# Patient Record
Sex: MALE | Race: WHITE | Employment: OTHER | URBAN - METROPOLITAN AREA
[De-identification: names, ages, dates, MRNs, and addresses within clinical notes are randomized per-mention and may not be internally consistent; named-entity substitution may affect disease eponyms.]

---

## 2017-03-09 PROBLEM — N20.1 URETERAL CALCULUS: Status: ACTIVE | Noted: 2017-03-09

## 2017-03-13 ENCOUNTER — OFFICE VISIT (OUTPATIENT)
Dept: UROLOGY | Age: 62
End: 2017-03-13
Payer: COMMERCIAL

## 2017-03-13 VITALS
TEMPERATURE: 98 F | DIASTOLIC BLOOD PRESSURE: 80 MMHG | HEIGHT: 72 IN | WEIGHT: 314 LBS | SYSTOLIC BLOOD PRESSURE: 110 MMHG | BODY MASS INDEX: 42.53 KG/M2

## 2017-03-13 DIAGNOSIS — N20.1 URETERAL CALCULUS: Primary | ICD-10-CM

## 2017-03-13 PROCEDURE — 99213 OFFICE O/P EST LOW 20 MIN: CPT | Performed by: UROLOGY

## 2017-03-13 PROCEDURE — 52310 CYSTOSCOPY AND TREATMENT: CPT | Performed by: UROLOGY

## 2017-04-22 ENCOUNTER — HOSPITAL ENCOUNTER (OUTPATIENT)
Dept: GENERAL RADIOLOGY | Age: 62
Discharge: HOME | End: 2017-04-22
Payer: COMMERCIAL

## 2017-04-22 ENCOUNTER — HOSPITAL ENCOUNTER (OUTPATIENT)
Age: 62
Discharge: HOME | End: 2017-04-22
Payer: COMMERCIAL

## 2017-04-22 DIAGNOSIS — N20.1 URETERAL CALCULUS: ICD-10-CM

## 2017-04-22 PROCEDURE — 74000 XR ABDOMEN LIMITED (KUB): CPT

## 2017-04-26 ENCOUNTER — OFFICE VISIT (OUTPATIENT)
Dept: UROLOGY | Age: 62
End: 2017-04-26
Payer: COMMERCIAL

## 2017-04-26 VITALS
HEIGHT: 72 IN | WEIGHT: 310 LBS | DIASTOLIC BLOOD PRESSURE: 80 MMHG | SYSTOLIC BLOOD PRESSURE: 110 MMHG | BODY MASS INDEX: 41.99 KG/M2

## 2017-04-26 DIAGNOSIS — N20.0 KIDNEY STONES: Primary | ICD-10-CM

## 2017-04-26 PROCEDURE — 99213 OFFICE O/P EST LOW 20 MIN: CPT | Performed by: PHYSICIAN ASSISTANT

## 2017-07-25 ENCOUNTER — APPOINTMENT (OUTPATIENT)
Dept: CT IMAGING | Age: 62
End: 2017-07-25
Payer: COMMERCIAL

## 2017-07-25 ENCOUNTER — HOSPITAL ENCOUNTER (EMERGENCY)
Age: 62
Discharge: HOME | End: 2017-07-25
Attending: EMERGENCY MEDICINE
Payer: COMMERCIAL

## 2017-07-25 VITALS
WEIGHT: 310 LBS | HEART RATE: 86 BPM | TEMPERATURE: 98.3 F | DIASTOLIC BLOOD PRESSURE: 69 MMHG | BODY MASS INDEX: 41.99 KG/M2 | OXYGEN SATURATION: 97 % | HEIGHT: 72 IN | SYSTOLIC BLOOD PRESSURE: 108 MMHG | RESPIRATION RATE: 20 BRPM

## 2017-07-25 DIAGNOSIS — K59.03 DRUG-INDUCED CONSTIPATION: ICD-10-CM

## 2017-07-25 DIAGNOSIS — R94.31 ABNORMAL EKG: Primary | ICD-10-CM

## 2017-07-25 DIAGNOSIS — M79.89 SWELLING OF LOWER EXTREMITY: ICD-10-CM

## 2017-07-25 LAB
-: ABNORMAL
ABSOLUTE EOS #: 0.2 K/UL (ref 0–0.4)
ABSOLUTE LYMPH #: 1.5 K/UL (ref 1–4.8)
ABSOLUTE MONO #: 0.7 K/UL (ref 0.2–0.8)
ALBUMIN SERPL-MCNC: 4 G/DL (ref 3.5–5.2)
ALBUMIN/GLOBULIN RATIO: 1.1 (ref 1–2.5)
ALP BLD-CCNC: 81 U/L (ref 40–129)
ALT SERPL-CCNC: 41 U/L (ref 5–41)
AMORPHOUS: ABNORMAL
ANION GAP SERPL CALCULATED.3IONS-SCNC: 16 MMOL/L (ref 9–17)
AST SERPL-CCNC: 27 U/L
BACTERIA: ABNORMAL
BASOPHILS # BLD: 1 %
BASOPHILS ABSOLUTE: 0.1 K/UL (ref 0–0.2)
BILIRUB SERPL-MCNC: 1.1 MG/DL (ref 0.3–1.2)
BILIRUBIN URINE: NEGATIVE
BUN BLDV-MCNC: 18 MG/DL (ref 6–20)
BUN/CREAT BLD: 15 (ref 9–20)
CALCIUM SERPL-MCNC: 10.1 MG/DL (ref 8.6–10.4)
CASTS UA: ABNORMAL /LPF
CHLORIDE BLD-SCNC: 94 MMOL/L (ref 98–107)
CO2: 27 MMOL/L (ref 20–31)
COLOR: YELLOW
COMMENT UA: ABNORMAL
CREAT SERPL-MCNC: 1.21 MG/DL (ref 0.7–1.2)
CRYSTALS, UA: ABNORMAL /HPF
D-DIMER QUANTITATIVE: 2.34 MG/L FEU (ref 0.19–0.5)
DIFFERENTIAL TYPE: ABNORMAL
EOSINOPHILS RELATIVE PERCENT: 2 %
EPITHELIAL CELLS UA: ABNORMAL /HPF (ref 0–5)
GFR AFRICAN AMERICAN: >60 ML/MIN
GFR NON-AFRICAN AMERICAN: >60 ML/MIN
GFR SERPL CREATININE-BSD FRML MDRD: ABNORMAL ML/MIN/{1.73_M2}
GFR SERPL CREATININE-BSD FRML MDRD: ABNORMAL ML/MIN/{1.73_M2}
GLUCOSE BLD-MCNC: 115 MG/DL (ref 70–99)
GLUCOSE URINE: NEGATIVE
HCT VFR BLD CALC: 38.3 % (ref 41–53)
HEMOGLOBIN: 12.8 G/DL (ref 13.5–17)
INR BLD: 1 (ref 0.9–1.2)
KETONES, URINE: NEGATIVE
LEUKOCYTE ESTERASE, URINE: NEGATIVE
LYMPHOCYTES # BLD: 16 %
MCH RBC QN AUTO: 27.4 PG (ref 26–34)
MCHC RBC AUTO-ENTMCNC: 33.5 G/DL (ref 31–37)
MCV RBC AUTO: 81.8 FL (ref 80–100)
MONOCYTES # BLD: 7 %
MUCUS: ABNORMAL
NITRITE, URINE: NEGATIVE
OTHER OBSERVATIONS UA: ABNORMAL
PARTIAL THROMBOPLASTIN TIME: 29.4 SEC (ref 23.2–34.4)
PDW BLD-RTO: 13.5 % (ref 12.1–15.2)
PH UA: 6 (ref 5–9)
PLATELET # BLD: 235 K/UL (ref 140–450)
PLATELET ESTIMATE: ABNORMAL
PMV BLD AUTO: 7.3 FL (ref 6–12)
POTASSIUM SERPL-SCNC: 4 MMOL/L (ref 3.7–5.3)
PROTEIN UA: NEGATIVE
PROTHROMBIN TIME: 10 SEC (ref 9.7–12.2)
RBC # BLD: 4.68 M/UL (ref 4.5–5.9)
RBC # BLD: ABNORMAL 10*6/UL
RBC UA: ABNORMAL /HPF (ref 0–2)
RENAL EPITHELIAL, UA: ABNORMAL /HPF
SEG NEUTROPHILS: 74 %
SEGMENTED NEUTROPHILS ABSOLUTE COUNT: 7.1 K/UL (ref 1.8–7.7)
SODIUM BLD-SCNC: 137 MMOL/L (ref 135–144)
SPECIFIC GRAVITY UA: 1.02 (ref 1.01–1.02)
TOTAL PROTEIN: 7.5 G/DL (ref 6.4–8.3)
TRICHOMONAS: ABNORMAL
TROPONIN INTERP: NORMAL
TROPONIN T: <0.03 NG/ML
TSH SERPL DL<=0.05 MIU/L-ACNC: 0.65 MIU/L (ref 0.3–5)
TURBIDITY: CLEAR
URINE HGB: NEGATIVE
UROBILINOGEN, URINE: NORMAL
WBC # BLD: 9.6 K/UL (ref 3.5–11)
WBC # BLD: ABNORMAL 10*3/UL
WBC UA: ABNORMAL /HPF (ref 0–5)
YEAST: ABNORMAL

## 2017-07-25 PROCEDURE — 85025 COMPLETE CBC W/AUTO DIFF WBC: CPT

## 2017-07-25 PROCEDURE — 74176 CT ABD & PELVIS W/O CONTRAST: CPT

## 2017-07-25 PROCEDURE — 85730 THROMBOPLASTIN TIME PARTIAL: CPT

## 2017-07-25 PROCEDURE — 6360000004 HC RX CONTRAST MEDICATION: Performed by: EMERGENCY MEDICINE

## 2017-07-25 PROCEDURE — 85610 PROTHROMBIN TIME: CPT

## 2017-07-25 PROCEDURE — 80053 COMPREHEN METABOLIC PANEL: CPT

## 2017-07-25 PROCEDURE — 85379 FIBRIN DEGRADATION QUANT: CPT

## 2017-07-25 PROCEDURE — 93005 ELECTROCARDIOGRAM TRACING: CPT

## 2017-07-25 PROCEDURE — 99284 EMERGENCY DEPT VISIT MOD MDM: CPT

## 2017-07-25 PROCEDURE — 84484 ASSAY OF TROPONIN QUANT: CPT

## 2017-07-25 PROCEDURE — 2580000003 HC RX 258: Performed by: EMERGENCY MEDICINE

## 2017-07-25 PROCEDURE — 84443 ASSAY THYROID STIM HORMONE: CPT

## 2017-07-25 PROCEDURE — 71275 CT ANGIOGRAPHY CHEST: CPT

## 2017-07-25 PROCEDURE — 96372 THER/PROPH/DIAG INJ SC/IM: CPT

## 2017-07-25 PROCEDURE — 6360000002 HC RX W HCPCS: Performed by: EMERGENCY MEDICINE

## 2017-07-25 PROCEDURE — 81001 URINALYSIS AUTO W/SCOPE: CPT

## 2017-07-25 RX ORDER — POTASSIUM CHLORIDE 1500 MG/1
20 TABLET, FILM COATED, EXTENDED RELEASE ORAL
COMMUNITY
End: 2019-05-14

## 2017-07-25 RX ADMIN — ENOXAPARIN SODIUM 120 MG: 120 INJECTION SUBCUTANEOUS at 22:58

## 2017-07-25 RX ADMIN — IOPAMIDOL 100 ML: 612 INJECTION, SOLUTION INTRAVENOUS at 21:37

## 2017-07-25 RX ADMIN — SODIUM CHLORIDE 500 ML: 9 INJECTION, SOLUTION INTRAVENOUS at 19:42

## 2017-07-25 ASSESSMENT — PAIN DESCRIPTION - PAIN TYPE: TYPE: ACUTE PAIN

## 2017-07-25 ASSESSMENT — PAIN DESCRIPTION - LOCATION: LOCATION: ABDOMEN

## 2017-07-25 ASSESSMENT — PAIN SCALES - GENERAL: PAINLEVEL_OUTOF10: 6

## 2017-07-26 ENCOUNTER — HOSPITAL ENCOUNTER (OUTPATIENT)
Dept: VASCULAR LAB | Age: 62
Discharge: HOME | End: 2017-07-26
Payer: COMMERCIAL

## 2017-07-26 DIAGNOSIS — R60.9 SWELLING: ICD-10-CM

## 2017-07-26 DIAGNOSIS — R79.89 ELEVATED D-DIMER: ICD-10-CM

## 2017-07-26 PROCEDURE — 93970 EXTREMITY STUDY: CPT

## 2017-08-03 LAB
EKG ATRIAL RATE: 73 BPM
EKG P AXIS: -8 DEGREES
EKG P-R INTERVAL: 202 MS
EKG Q-T INTERVAL: 406 MS
EKG QRS DURATION: 104 MS
EKG QTC CALCULATION (BAZETT): 447 MS
EKG R AXIS: -42 DEGREES
EKG T AXIS: -12 DEGREES
EKG VENTRICULAR RATE: 73 BPM

## 2017-09-07 ENCOUNTER — OFFICE VISIT (OUTPATIENT)
Dept: FAMILY MEDICINE CLINIC | Age: 62
End: 2017-09-07
Payer: COMMERCIAL

## 2017-09-07 VITALS
RESPIRATION RATE: 18 BRPM | HEIGHT: 72 IN | SYSTOLIC BLOOD PRESSURE: 120 MMHG | WEIGHT: 301 LBS | BODY MASS INDEX: 40.77 KG/M2 | HEART RATE: 72 BPM | DIASTOLIC BLOOD PRESSURE: 80 MMHG

## 2017-09-07 DIAGNOSIS — R10.32 LEFT LOWER QUADRANT PAIN: Primary | ICD-10-CM

## 2017-09-07 PROCEDURE — 99202 OFFICE O/P NEW SF 15 MIN: CPT | Performed by: FAMILY MEDICINE

## 2017-09-07 RX ORDER — PANTOPRAZOLE SODIUM 20 MG/1
20 TABLET, DELAYED RELEASE ORAL DAILY
Qty: 30 TABLET | Refills: 1 | Status: SHIPPED | OUTPATIENT
Start: 2017-09-07 | End: 2019-05-14

## 2017-09-07 RX ORDER — TESTOSTERONE GEL, 1% 10 MG/G
GEL TRANSDERMAL DAILY
COMMUNITY
End: 2018-04-25 | Stop reason: ALTCHOICE

## 2017-09-12 ENCOUNTER — OFFICE VISIT (OUTPATIENT)
Dept: CARDIOLOGY | Age: 62
End: 2017-09-12
Payer: COMMERCIAL

## 2017-09-12 VITALS
WEIGHT: 301.8 LBS | BODY MASS INDEX: 40.88 KG/M2 | SYSTOLIC BLOOD PRESSURE: 111 MMHG | DIASTOLIC BLOOD PRESSURE: 80 MMHG | OXYGEN SATURATION: 94 % | HEART RATE: 81 BPM | HEIGHT: 72 IN

## 2017-09-12 DIAGNOSIS — E78.5 DYSLIPIDEMIA: ICD-10-CM

## 2017-09-12 DIAGNOSIS — R94.31 ABNORMAL ECG: Primary | ICD-10-CM

## 2017-09-12 DIAGNOSIS — I10 ESSENTIAL HYPERTENSION: ICD-10-CM

## 2017-09-12 DIAGNOSIS — E66.01 OBESITY, MORBID, BMI 40.0-49.9 (HCC): ICD-10-CM

## 2017-09-12 PROCEDURE — 99244 OFF/OP CNSLTJ NEW/EST MOD 40: CPT | Performed by: INTERNAL MEDICINE

## 2017-09-13 ENCOUNTER — HOSPITAL ENCOUNTER (OUTPATIENT)
Dept: NON INVASIVE DIAGNOSTICS | Age: 62
Discharge: HOME | End: 2017-09-13
Payer: COMMERCIAL

## 2017-09-13 DIAGNOSIS — R94.31 ABNORMAL ECG: ICD-10-CM

## 2017-09-13 LAB
LV EF: 60 %
LVEF MODALITY: NORMAL

## 2017-09-13 PROCEDURE — 3430000000 HC RX DIAGNOSTIC RADIOPHARMACEUTICAL: Performed by: INTERNAL MEDICINE

## 2017-09-13 PROCEDURE — 78452 HT MUSCLE IMAGE SPECT MULT: CPT

## 2017-09-13 PROCEDURE — A9500 TC99M SESTAMIBI: HCPCS | Performed by: INTERNAL MEDICINE

## 2017-09-13 PROCEDURE — 93017 CV STRESS TEST TRACING ONLY: CPT

## 2017-09-13 PROCEDURE — 93306 TTE W/DOPPLER COMPLETE: CPT

## 2017-09-13 RX ADMIN — Medication 30 MILLICURIE: at 13:30

## 2017-09-14 ENCOUNTER — HOSPITAL ENCOUNTER (OUTPATIENT)
Dept: NON INVASIVE DIAGNOSTICS | Age: 62
Discharge: HOME | End: 2017-09-14
Payer: COMMERCIAL

## 2017-09-14 PROCEDURE — 3430000000 HC RX DIAGNOSTIC RADIOPHARMACEUTICAL: Performed by: INTERNAL MEDICINE

## 2017-09-14 PROCEDURE — A9500 TC99M SESTAMIBI: HCPCS | Performed by: INTERNAL MEDICINE

## 2017-09-14 RX ADMIN — Medication 30 MILLICURIE: at 08:00

## 2017-09-17 ASSESSMENT — ENCOUNTER SYMPTOMS
PHOTOPHOBIA: 0
TROUBLE SWALLOWING: 0
ABDOMINAL PAIN: 1
DIARRHEA: 0
CONSTIPATION: 0
SHORTNESS OF BREATH: 0
ABDOMINAL DISTENTION: 0
WHEEZING: 0
BACK PAIN: 0
FACIAL SWELLING: 0
NAUSEA: 0
BELCHING: 1
COUGH: 0
VOMITING: 0
BLOOD IN STOOL: 1
COLOR CHANGE: 0

## 2017-09-21 ENCOUNTER — TELEPHONE (OUTPATIENT)
Dept: CARDIOLOGY | Age: 62
End: 2017-09-21

## 2017-09-26 ENCOUNTER — OFFICE VISIT (OUTPATIENT)
Dept: CARDIOLOGY | Age: 62
End: 2017-09-26
Payer: COMMERCIAL

## 2017-09-26 VITALS
SYSTOLIC BLOOD PRESSURE: 120 MMHG | DIASTOLIC BLOOD PRESSURE: 85 MMHG | BODY MASS INDEX: 40.63 KG/M2 | OXYGEN SATURATION: 95 % | HEART RATE: 76 BPM | WEIGHT: 306.6 LBS | HEIGHT: 73 IN

## 2017-09-26 DIAGNOSIS — E66.01 SEVERE OBESITY (BMI >= 40) (HCC): ICD-10-CM

## 2017-09-26 DIAGNOSIS — R94.31 ABNORMAL ECG: Primary | ICD-10-CM

## 2017-09-26 DIAGNOSIS — I10 ESSENTIAL HYPERTENSION: ICD-10-CM

## 2017-09-26 DIAGNOSIS — R06.02 SHORTNESS OF BREATH ON EXERTION: ICD-10-CM

## 2017-09-26 DIAGNOSIS — E78.5 DYSLIPIDEMIA: ICD-10-CM

## 2017-09-26 PROCEDURE — 99213 OFFICE O/P EST LOW 20 MIN: CPT | Performed by: INTERNAL MEDICINE

## 2017-10-03 LAB
AVERAGE GLUCOSE: 120 MG/DL (ref 66–114)
CHOLESTEROL/HDL RATIO: 3.1
CHOLESTEROL: 115 MG/DL
HBA1C MFR BLD: 5.8 % (ref 4.2–5.8)
HDLC SERPL-MCNC: 37 MG/DL (ref 40–60)
LDL CHOLESTEROL CALCULATED: 51 MG/DL
LDL/HDL RATIO: 1.4
TRIGL SERPL-MCNC: 136 MG/DL
VLDLC SERPL CALC-MCNC: 27 MG/DL

## 2017-11-11 ENCOUNTER — HOSPITAL ENCOUNTER (OUTPATIENT)
Age: 62
Discharge: HOME | End: 2017-11-11
Payer: COMMERCIAL

## 2017-11-11 LAB
BUN BLDV-MCNC: 18 MG/DL (ref 8–23)
CREAT SERPL-MCNC: 0.93 MG/DL (ref 0.7–1.2)
GFR AFRICAN AMERICAN: >60 ML/MIN
GFR NON-AFRICAN AMERICAN: >60 ML/MIN
GFR SERPL CREATININE-BSD FRML MDRD: NORMAL ML/MIN/{1.73_M2}
GFR SERPL CREATININE-BSD FRML MDRD: NORMAL ML/MIN/{1.73_M2}
HCT VFR BLD CALC: 40.7 % (ref 41–53)
HEMOGLOBIN: 13.6 G/DL (ref 13.5–17)

## 2017-11-11 PROCEDURE — 85018 HEMOGLOBIN: CPT

## 2017-11-11 PROCEDURE — 84520 ASSAY OF UREA NITROGEN: CPT

## 2017-11-11 PROCEDURE — 82565 ASSAY OF CREATININE: CPT

## 2017-11-11 PROCEDURE — 85014 HEMATOCRIT: CPT

## 2017-11-11 PROCEDURE — 36415 COLL VENOUS BLD VENIPUNCTURE: CPT

## 2018-04-25 ENCOUNTER — OFFICE VISIT (OUTPATIENT)
Dept: UROLOGY | Age: 63
End: 2018-04-25
Payer: COMMERCIAL

## 2018-04-25 ENCOUNTER — HOSPITAL ENCOUNTER (OUTPATIENT)
Age: 63
Discharge: HOME | End: 2018-04-27
Payer: COMMERCIAL

## 2018-04-25 ENCOUNTER — HOSPITAL ENCOUNTER (OUTPATIENT)
Dept: GENERAL RADIOLOGY | Age: 63
Discharge: HOME | End: 2018-04-27
Payer: COMMERCIAL

## 2018-04-25 VITALS
WEIGHT: 293 LBS | DIASTOLIC BLOOD PRESSURE: 84 MMHG | SYSTOLIC BLOOD PRESSURE: 128 MMHG | BODY MASS INDEX: 39.68 KG/M2 | HEIGHT: 72 IN

## 2018-04-25 DIAGNOSIS — Z12.5 PROSTATE CANCER SCREENING: ICD-10-CM

## 2018-04-25 DIAGNOSIS — N20.0 KIDNEY STONES: ICD-10-CM

## 2018-04-25 DIAGNOSIS — N20.0 RENAL STONE: Primary | ICD-10-CM

## 2018-04-25 PROCEDURE — 99213 OFFICE O/P EST LOW 20 MIN: CPT | Performed by: NURSE PRACTITIONER

## 2018-04-25 PROCEDURE — 74018 RADEX ABDOMEN 1 VIEW: CPT

## 2018-04-25 RX ORDER — COVID-19 ANTIGEN TEST
KIT MISCELLANEOUS 2 TIMES DAILY
COMMUNITY
End: 2020-03-03

## 2018-04-25 ASSESSMENT — ENCOUNTER SYMPTOMS
COLOR CHANGE: 0
NAUSEA: 0
VOMITING: 0
EYE PAIN: 0
BACK PAIN: 0
COUGH: 0
ABDOMINAL PAIN: 0
SHORTNESS OF BREATH: 0
WHEEZING: 0
CONSTIPATION: 0
EYE REDNESS: 0

## 2018-05-01 ENCOUNTER — HOSPITAL ENCOUNTER (OUTPATIENT)
Age: 63
Discharge: HOME | End: 2018-05-01
Payer: COMMERCIAL

## 2018-05-01 DIAGNOSIS — Z12.5 PROSTATE CANCER SCREENING: ICD-10-CM

## 2018-05-01 LAB — PROSTATE SPECIFIC ANTIGEN: 0.11 UG/L

## 2018-05-01 PROCEDURE — 36415 COLL VENOUS BLD VENIPUNCTURE: CPT

## 2018-05-01 PROCEDURE — G0103 PSA SCREENING: HCPCS

## 2018-05-02 DIAGNOSIS — Z12.5 SCREENING PSA (PROSTATE SPECIFIC ANTIGEN): Primary | ICD-10-CM

## 2019-04-29 ENCOUNTER — TELEPHONE (OUTPATIENT)
Dept: UROLOGY | Age: 64
End: 2019-04-29

## 2019-04-29 DIAGNOSIS — N20.0 RENAL STONE: Primary | ICD-10-CM

## 2019-04-30 ENCOUNTER — HOSPITAL ENCOUNTER (OUTPATIENT)
Dept: GENERAL RADIOLOGY | Age: 64
Discharge: HOME | End: 2019-05-02
Payer: COMMERCIAL

## 2019-04-30 ENCOUNTER — HOSPITAL ENCOUNTER (OUTPATIENT)
Age: 64
Discharge: HOME | End: 2019-05-02
Payer: COMMERCIAL

## 2019-04-30 DIAGNOSIS — N20.0 RENAL STONE: ICD-10-CM

## 2019-04-30 PROCEDURE — 74018 RADEX ABDOMEN 1 VIEW: CPT

## 2019-05-14 ENCOUNTER — OFFICE VISIT (OUTPATIENT)
Dept: UROLOGY | Age: 64
End: 2019-05-14
Payer: COMMERCIAL

## 2019-05-14 VITALS
BODY MASS INDEX: 44.1 KG/M2 | HEIGHT: 71 IN | DIASTOLIC BLOOD PRESSURE: 104 MMHG | WEIGHT: 315 LBS | SYSTOLIC BLOOD PRESSURE: 148 MMHG | HEART RATE: 71 BPM

## 2019-05-14 DIAGNOSIS — N20.0 RENAL STONE: Primary | ICD-10-CM

## 2019-05-14 DIAGNOSIS — Z12.5 SCREENING PSA (PROSTATE SPECIFIC ANTIGEN): ICD-10-CM

## 2019-05-14 PROCEDURE — 99213 OFFICE O/P EST LOW 20 MIN: CPT | Performed by: NURSE PRACTITIONER

## 2019-05-14 RX ORDER — TESTOSTERONE CYPIONATE 200 MG/ML
VIAL (ML) INTRAMUSCULAR
Status: ON HOLD | COMMUNITY
End: 2020-07-13 | Stop reason: ALTCHOICE

## 2019-05-14 NOTE — PATIENT INSTRUCTIONS
To prevent future stone formation:  1) drink around 80 ounces of water per day  2) Avoid/minimize intake of \"bad fluids\" (soda pop, coffee, tea, alcohol, energy drinks)  3) reduce consumption of sodium/salt  4) reduce consumption of fatty animal protein (full-fat cheese/milk/dairy, red meats, pork).  Limit protein intake to low-fat/lean options (low-fat dairy, fish, chicken, turkey)

## 2019-05-21 ASSESSMENT — ENCOUNTER SYMPTOMS
EYE REDNESS: 0
BACK PAIN: 0
VOMITING: 0
EYE PAIN: 0
ABDOMINAL PAIN: 0
CONSTIPATION: 0
NAUSEA: 0
SHORTNESS OF BREATH: 0
COUGH: 0
COLOR CHANGE: 0
WHEEZING: 0

## 2019-05-21 NOTE — PROGRESS NOTES
HPI:    Patient is a 66 y.o. male in no acute distress. He is alert and oriented to person, place, and time. History  3/2017 right HLL for 8mm ureteral stone    Today  Here today for a one year follow-up for renal stones. He did have a KUB completed prior to today's visit. This does show a questionable stone over the left renal shadow, but this is difficult to discern due to stool burden and the calcification is in line with a rib. He denies new or worsening lower urinary tract symptoms. He denies dysuria or gross hematuria. Past Medical History:   Diagnosis Date    Acromegalia ([address]     Arthritis     H/O echocardiogram 09/13/2017    poor image quality. EF 60%. Moderate LV hypertrophy normal LV cavity size. Unable to assess specific wall motion abnormalities due to image quality. No significant valvular abnormalities. Mild diastolic dysfunction is seen.  History of stress test     25+ years ago. per patient it was normal    History of stress test 09/14/2017    Equivocal study. Moderate perfusion defect of moderate intensity in the inferolateral and inferior regions during stress and rest imaging. EF  71% without regional wall motion abnormalities. No significant electrocardiographic evidence of MI during EKG Vu Treadmill score 6,low risk for CAD.     Hyperlipidemia     Hypertension     Thyroid disease     HYPOTHYROID     Past Surgical History:   Procedure Laterality Date    CATARACT REMOVAL Right     COLONOSCOPY  2014    CYSTOSCOPY Right 03/10/2017    JOINT REPLACEMENT Right     KOLE    JOINT REPLACEMENT Left     TKA    JOINT REPLACEMENT Right     TKA    JOINT REPLACEMENT Left     shoulder    OTHER SURGICAL HISTORY      CAROTID TUMOR REMOVAL    VASCULAR SURGERY       Outpatient Encounter Medications as of 5/14/2019   Medication Sig Dispense Refill    Testosterone Cypionate 200 MG/ML SOLN Inject as directed One injection every 2 weeks      Naproxen Sodium (ALEVE) 220 MG CAPS Take by mouth 2 times daily      NONFORMULARY mylan/levothyrox 0.175 mg daily      ibuprofen (ADVIL;MOTRIN) 800 MG tablet Take 1 tablet by mouth every 6 hours as needed for Pain (Patient taking differently: Take 600 mg by mouth every 6 hours as needed for Pain ) 30 tablet 0    lovastatin (MEVACOR) 40 MG tablet Take 40 mg by mouth daily      hydrALAZINE (APRESOLINE) 50 MG tablet Take 50 mg by mouth 3 times daily      metoprolol tartrate (LOPRESSOR) 25 MG tablet Take 25 mg by mouth daily      valsartan-hydrochlorothiazide (DIOVAN-HCT) 320-25 MG per tablet Take 1 tablet by mouth daily 160/12.5 - daily      aspirin 81 MG tablet Take 81 mg by mouth daily      Ascorbic Acid (VITAMIN C) 500 MG CAPS Take by mouth daily      Multiple Vitamins-Minerals (MULTIVITAL) TABS Take 1 tablet by mouth daily       Omega-3 Fatty Acids (FISH OIL PO) Take 1 capsule by mouth daily       [DISCONTINUED] hydrocortisone (ANUSOL-HC) 2.5 % rectal cream Place rectally 2 times daily X 2 Weeks 1 Tube 0    [DISCONTINUED] pantoprazole (PROTONIX) 20 MG tablet Take 1 tablet by mouth daily 30 tablet 1    [DISCONTINUED] potassium chloride (KLOR-CON M) 20 MEQ TBCR extended release tablet Take 20 mEq by mouth 3 times daily (with meals)      [DISCONTINUED] levothyroxine (SYNTHROID) 175 MCG tablet Take 175 mcg by mouth daily      [DISCONTINUED] meloxicam (MOBIC) 15 MG tablet Take 15 mg by mouth daily      [DISCONTINUED] Methylsulfonylmethane (MSM) 1500 MG TABS Take by mouth 2 times daily       No facility-administered encounter medications on file as of 5/14/2019.        Current Outpatient Medications on File Prior to Visit   Medication Sig Dispense Refill    Testosterone Cypionate 200 MG/ML SOLN Inject as directed One injection every 2 weeks      Naproxen Sodium (ALEVE) 220 MG CAPS Take by mouth 2 times daily      NONFORMULARY mylan/levothyrox 0.175 mg daily      ibuprofen (ADVIL;MOTRIN) 800 MG tablet Take 1 tablet by mouth every 6 hours as needed for Pain (Patient taking differently: Take 600 mg by mouth every 6 hours as needed for Pain ) 30 tablet 0    lovastatin (MEVACOR) 40 MG tablet Take 40 mg by mouth daily      hydrALAZINE (APRESOLINE) 50 MG tablet Take 50 mg by mouth 3 times daily      metoprolol tartrate (LOPRESSOR) 25 MG tablet Take 25 mg by mouth daily      valsartan-hydrochlorothiazide (DIOVAN-HCT) 320-25 MG per tablet Take 1 tablet by mouth daily 160/12.5 - daily      aspirin 81 MG tablet Take 81 mg by mouth daily      Ascorbic Acid (VITAMIN C) 500 MG CAPS Take by mouth daily      Multiple Vitamins-Minerals (MULTIVITAL) TABS Take 1 tablet by mouth daily       Omega-3 Fatty Acids (FISH OIL PO) Take 1 capsule by mouth daily        No current facility-administered medications on file prior to visit. Patient has no known allergies. Family History   Problem Relation Age of Onset    Diabetes Mother     Alzheimer's Disease Mother      Social History     Tobacco Use   Smoking Status Never Smoker   Smokeless Tobacco Never Used       Social History     Substance and Sexual Activity   Alcohol Use Yes    Comment: occ       Review of Systems   Constitutional: Negative for appetite change, chills and fever. Eyes: Negative for pain, redness and visual disturbance. Respiratory: Negative for cough, shortness of breath and wheezing. Cardiovascular: Negative for chest pain and leg swelling. Gastrointestinal: Negative for abdominal pain, constipation, nausea and vomiting. Genitourinary: Negative for decreased urine volume, difficulty urinating, discharge, dysuria, enuresis, flank pain, frequency, hematuria, penile pain, scrotal swelling, testicular pain and urgency. Musculoskeletal: Negative for back pain, joint swelling and myalgias. Skin: Negative for color change, rash and wound. Neurological: Negative for dizziness, tremors and numbness. Hematological: Negative for adenopathy. Does not bruise/bleed easily.        BP (!) 148/104 (Site: Left Upper Arm, Position: Sitting, Cuff Size: Large Adult)   Pulse 71   Ht 5' 11\" (1.803 m)   Wt (!) 326 lb (147.9 kg)   BMI 45.47 kg/m²       PHYSICAL EXAM:  Constitutional: Patient in no acute distress; Neuro: alert and oriented to person place and time. Psych: Mood and affect normal.  Skin: Normal  Lungs: Respiratory effort normal  Cardiovascular:  Normal peripheral pulses  Abdomen: Soft, non-tender, non-distended with no CVA, flank pain, hepatosplenomegaly or hernia. Kidneys normal.  Bladder non-tender and not distended. Lymphatics: no palpable lymphadenopathy  Penis normal  Urethral meatus normal  Scrotal exam normal  Testicles normal bilaterally  Epididymis normal bilaterally  No evidence of inguinal hernia      Lab Results   Component Value Date    BUN 18 11/11/2017     Lab Results   Component Value Date    CREATININE 0.93 11/11/2017     Lab Results   Component Value Date    PSA 0.11 05/01/2018       ASSESSMENT:   Diagnosis Orders   1. Renal stone  XR ABDOMEN (KUB) (SINGLE AP VIEW)   2. Screening PSA (prostate specific antigen)  Psa screening           PLAN:  To prevent future stone formation:  1) drink around 80 ounces of water per day  2) Avoid/minimize intake of \"bad fluids\" (soda pop, coffee, tea, alcohol, energy drinks)  3) reduce consumption of sodium/salt  4) reduce consumption of fatty animal protein (full-fat cheese/milk/dairy, red meats, pork). Limit protein intake to low-fat/lean options (low-fat dairy, fish, chicken, turkey)    We will have him get a screening PSA and we will call him with these results. We will see him on an annual basis with a KUB prior or sooner if needed for new or worsening symptoms.

## 2019-05-23 ENCOUNTER — HOSPITAL ENCOUNTER (OUTPATIENT)
Age: 64
Discharge: HOME | End: 2019-05-23
Payer: COMMERCIAL

## 2019-05-23 DIAGNOSIS — Z12.5 SCREENING PSA (PROSTATE SPECIFIC ANTIGEN): ICD-10-CM

## 2019-05-23 LAB — PROSTATE SPECIFIC ANTIGEN: 0.59 UG/L

## 2019-05-23 PROCEDURE — G0103 PSA SCREENING: HCPCS

## 2019-05-23 PROCEDURE — 36415 COLL VENOUS BLD VENIPUNCTURE: CPT

## 2019-05-24 ENCOUNTER — TELEPHONE (OUTPATIENT)
Dept: UROLOGY | Age: 64
End: 2019-05-24

## 2019-11-04 ENCOUNTER — HOSPITAL ENCOUNTER (OUTPATIENT)
Age: 64
Discharge: HOME | End: 2019-11-06
Payer: COMMERCIAL

## 2019-11-04 ENCOUNTER — HOSPITAL ENCOUNTER (OUTPATIENT)
Dept: GENERAL RADIOLOGY | Age: 64
Discharge: HOME | End: 2019-11-06
Payer: COMMERCIAL

## 2019-11-04 DIAGNOSIS — N20.0 RENAL STONE: ICD-10-CM

## 2019-11-04 PROCEDURE — 74018 RADEX ABDOMEN 1 VIEW: CPT

## 2019-11-18 ENCOUNTER — OFFICE VISIT (OUTPATIENT)
Dept: UROLOGY | Age: 64
End: 2019-11-18
Payer: COMMERCIAL

## 2019-11-18 VITALS
WEIGHT: 315 LBS | SYSTOLIC BLOOD PRESSURE: 140 MMHG | BODY MASS INDEX: 44.1 KG/M2 | HEIGHT: 71 IN | DIASTOLIC BLOOD PRESSURE: 90 MMHG

## 2019-11-18 DIAGNOSIS — N20.0 RENAL STONE: Primary | ICD-10-CM

## 2019-11-18 PROCEDURE — 99213 OFFICE O/P EST LOW 20 MIN: CPT | Performed by: UROLOGY

## 2019-11-18 ASSESSMENT — ENCOUNTER SYMPTOMS
ABDOMINAL PAIN: 0
NAUSEA: 0
EYE PAIN: 0
WHEEZING: 0
COLOR CHANGE: 0
VOMITING: 0
BACK PAIN: 0
COUGH: 0
SHORTNESS OF BREATH: 0
EYE REDNESS: 0

## 2020-03-03 RX ORDER — POLYETHYLENE GLYCOL 3350 17 G/17G
17 POWDER, FOR SOLUTION ORAL NIGHTLY
COMMUNITY

## 2020-03-04 ENCOUNTER — ANESTHESIA EVENT (OUTPATIENT)
Dept: OPERATING ROOM | Age: 65
End: 2020-03-04
Payer: COMMERCIAL

## 2020-03-04 ENCOUNTER — ANESTHESIA (OUTPATIENT)
Dept: OPERATING ROOM | Age: 65
End: 2020-03-04
Payer: COMMERCIAL

## 2020-03-04 ENCOUNTER — HOSPITAL ENCOUNTER (OUTPATIENT)
Age: 65
Setting detail: OUTPATIENT SURGERY
Discharge: HOME | End: 2020-03-04
Attending: OPHTHALMOLOGY | Admitting: OPHTHALMOLOGY
Payer: COMMERCIAL

## 2020-03-04 VITALS
SYSTOLIC BLOOD PRESSURE: 110 MMHG | HEIGHT: 72 IN | HEART RATE: 58 BPM | OXYGEN SATURATION: 95 % | DIASTOLIC BLOOD PRESSURE: 79 MMHG | RESPIRATION RATE: 14 BRPM | WEIGHT: 288 LBS | BODY MASS INDEX: 39.01 KG/M2 | TEMPERATURE: 96.8 F

## 2020-03-04 VITALS
OXYGEN SATURATION: 98 % | RESPIRATION RATE: 12 BRPM | DIASTOLIC BLOOD PRESSURE: 75 MMHG | SYSTOLIC BLOOD PRESSURE: 147 MMHG

## 2020-03-04 PROBLEM — H33.022 RETINAL DETACHMENT OF LEFT EYE WITH MULTIPLE BREAKS: Status: ACTIVE | Noted: 2020-03-04

## 2020-03-04 LAB
EKG ATRIAL RATE: 67 BPM
EKG P AXIS: -28 DEGREES
EKG P-R INTERVAL: 224 MS
EKG Q-T INTERVAL: 408 MS
EKG QRS DURATION: 118 MS
EKG QTC CALCULATION (BAZETT): 431 MS
EKG R AXIS: -43 DEGREES
EKG VENTRICULAR RATE: 67 BPM
GFR NON-AFRICAN AMERICAN: >60 ML/MIN
GFR SERPL CREATININE-BSD FRML MDRD: >60 ML/MIN
GFR SERPL CREATININE-BSD FRML MDRD: ABNORMAL ML/MIN/{1.73_M2}
GLUCOSE BLD-MCNC: 90 MG/DL (ref 74–100)
POC CREATININE: 1.2 MG/DL (ref 0.51–1.19)
POC POTASSIUM: 3.8 MMOL/L (ref 3.5–4.5)

## 2020-03-04 PROCEDURE — 2580000003 HC RX 258: Performed by: ANESTHESIOLOGY

## 2020-03-04 PROCEDURE — 82947 ASSAY GLUCOSE BLOOD QUANT: CPT

## 2020-03-04 PROCEDURE — 6370000000 HC RX 637 (ALT 250 FOR IP): Performed by: OPHTHALMOLOGY

## 2020-03-04 PROCEDURE — 6360000002 HC RX W HCPCS

## 2020-03-04 PROCEDURE — 7100000041 HC SPAR PHASE II RECOVERY - ADDTL 15 MIN: Performed by: OPHTHALMOLOGY

## 2020-03-04 PROCEDURE — 2709999900 HC NON-CHARGEABLE SUPPLY: Performed by: OPHTHALMOLOGY

## 2020-03-04 PROCEDURE — 82565 ASSAY OF CREATININE: CPT

## 2020-03-04 PROCEDURE — 84132 ASSAY OF SERUM POTASSIUM: CPT

## 2020-03-04 PROCEDURE — 93005 ELECTROCARDIOGRAM TRACING: CPT | Performed by: ANESTHESIOLOGY

## 2020-03-04 PROCEDURE — 7100000040 HC SPAR PHASE II RECOVERY - FIRST 15 MIN: Performed by: OPHTHALMOLOGY

## 2020-03-04 PROCEDURE — 93010 ELECTROCARDIOGRAM REPORT: CPT | Performed by: INTERNAL MEDICINE

## 2020-03-04 PROCEDURE — 3700000001 HC ADD 15 MINUTES (ANESTHESIA): Performed by: OPHTHALMOLOGY

## 2020-03-04 PROCEDURE — C1784 OCULAR DEV, INTRAOP, DET RET: HCPCS | Performed by: OPHTHALMOLOGY

## 2020-03-04 PROCEDURE — 6360000002 HC RX W HCPCS: Performed by: OPHTHALMOLOGY

## 2020-03-04 PROCEDURE — 3600000014 HC SURGERY LEVEL 4 ADDTL 15MIN: Performed by: OPHTHALMOLOGY

## 2020-03-04 PROCEDURE — 2500000003 HC RX 250 WO HCPCS: Performed by: OPHTHALMOLOGY

## 2020-03-04 PROCEDURE — 3700000000 HC ANESTHESIA ATTENDED CARE: Performed by: OPHTHALMOLOGY

## 2020-03-04 PROCEDURE — 3600000004 HC SURGERY LEVEL 4 BASE: Performed by: OPHTHALMOLOGY

## 2020-03-04 RX ORDER — PHENYLEPHRINE HYDROCHLORIDE 100 MG/ML
1 SOLUTION/ DROPS OPHTHALMIC EVERY 5 MIN PRN
Status: COMPLETED | OUTPATIENT
Start: 2020-03-04 | End: 2020-03-04

## 2020-03-04 RX ORDER — SODIUM CHLORIDE, SODIUM LACTATE, POTASSIUM CHLORIDE, CALCIUM CHLORIDE 600; 310; 30; 20 MG/100ML; MG/100ML; MG/100ML; MG/100ML
INJECTION, SOLUTION INTRAVENOUS CONTINUOUS
Status: DISCONTINUED | OUTPATIENT
Start: 2020-03-04 | End: 2020-03-04 | Stop reason: HOSPADM

## 2020-03-04 RX ORDER — TROPICAMIDE 10 MG/ML
1 SOLUTION/ DROPS OPHTHALMIC EVERY 5 MIN PRN
Status: COMPLETED | OUTPATIENT
Start: 2020-03-04 | End: 2020-03-04

## 2020-03-04 RX ORDER — ATROPINE SULFATE 10 MG/ML
SOLUTION/ DROPS OPHTHALMIC PRN
Status: DISCONTINUED | OUTPATIENT
Start: 2020-03-04 | End: 2020-03-04 | Stop reason: ALTCHOICE

## 2020-03-04 RX ORDER — FENTANYL CITRATE 50 UG/ML
25 INJECTION, SOLUTION INTRAMUSCULAR; INTRAVENOUS EVERY 5 MIN PRN
Status: DISCONTINUED | OUTPATIENT
Start: 2020-03-04 | End: 2020-03-04 | Stop reason: HOSPADM

## 2020-03-04 RX ORDER — TOBRAMYCIN AND DEXAMETHASONE 3; 1 MG/ML; MG/ML
1 SUSPENSION/ DROPS OPHTHALMIC
Status: COMPLETED | OUTPATIENT
Start: 2020-03-04 | End: 2020-03-04

## 2020-03-04 RX ORDER — BRIMONIDINE TARTRATE 0.15 %
DROPS OPHTHALMIC (EYE) PRN
Status: DISCONTINUED | OUTPATIENT
Start: 2020-03-04 | End: 2020-03-04 | Stop reason: ALTCHOICE

## 2020-03-04 RX ORDER — ERYTHROMYCIN 5 MG/G
OINTMENT OPHTHALMIC PRN
Status: DISCONTINUED | OUTPATIENT
Start: 2020-03-04 | End: 2020-03-04 | Stop reason: ALTCHOICE

## 2020-03-04 RX ORDER — BALANCED SALT SOLUTION ENRICHED WITH BICARBONATE, DEXTROSE, AND GLUTATHIONE
KIT INTRAOCULAR PRN
Status: DISCONTINUED | OUTPATIENT
Start: 2020-03-04 | End: 2020-03-04 | Stop reason: ALTCHOICE

## 2020-03-04 RX ORDER — TETRACAINE HYDROCHLORIDE 5 MG/ML
SOLUTION OPHTHALMIC PRN
Status: DISCONTINUED | OUTPATIENT
Start: 2020-03-04 | End: 2020-03-04 | Stop reason: ALTCHOICE

## 2020-03-04 RX ORDER — ATROPINE SULFATE 10 MG/ML
1 SOLUTION/ DROPS OPHTHALMIC
Status: COMPLETED | OUTPATIENT
Start: 2020-03-04 | End: 2020-03-04

## 2020-03-04 RX ORDER — GENTAMICIN SULFATE 40 MG/ML
INJECTION, SOLUTION INTRAMUSCULAR; INTRAVENOUS PRN
Status: DISCONTINUED | OUTPATIENT
Start: 2020-03-04 | End: 2020-03-04 | Stop reason: ALTCHOICE

## 2020-03-04 RX ORDER — PROPOFOL 10 MG/ML
INJECTION, EMULSION INTRAVENOUS PRN
Status: DISCONTINUED | OUTPATIENT
Start: 2020-03-04 | End: 2020-03-04 | Stop reason: SDUPTHER

## 2020-03-04 RX ORDER — ONDANSETRON 2 MG/ML
4 INJECTION INTRAMUSCULAR; INTRAVENOUS
Status: DISCONTINUED | OUTPATIENT
Start: 2020-03-04 | End: 2020-03-04 | Stop reason: HOSPADM

## 2020-03-04 RX ORDER — FENTANYL CITRATE 50 UG/ML
50 INJECTION, SOLUTION INTRAMUSCULAR; INTRAVENOUS EVERY 5 MIN PRN
Status: DISCONTINUED | OUTPATIENT
Start: 2020-03-04 | End: 2020-03-04 | Stop reason: HOSPADM

## 2020-03-04 RX ORDER — MEPERIDINE HYDROCHLORIDE 50 MG/ML
12.5 INJECTION INTRAMUSCULAR; INTRAVENOUS; SUBCUTANEOUS EVERY 5 MIN PRN
Status: DISCONTINUED | OUTPATIENT
Start: 2020-03-04 | End: 2020-03-04 | Stop reason: HOSPADM

## 2020-03-04 RX ORDER — MIDAZOLAM HYDROCHLORIDE 1 MG/ML
1 INJECTION INTRAMUSCULAR; INTRAVENOUS EVERY 10 MIN PRN
Status: DISCONTINUED | OUTPATIENT
Start: 2020-03-04 | End: 2020-03-04 | Stop reason: HOSPADM

## 2020-03-04 RX ORDER — FENTANYL CITRATE 50 UG/ML
INJECTION, SOLUTION INTRAMUSCULAR; INTRAVENOUS PRN
Status: DISCONTINUED | OUTPATIENT
Start: 2020-03-04 | End: 2020-03-04 | Stop reason: SDUPTHER

## 2020-03-04 RX ADMIN — ATROPINE SULFATE 1 DROP: 10 SOLUTION/ DROPS OPHTHALMIC at 06:47

## 2020-03-04 RX ADMIN — TOBRAMYCIN AND DEXAMETHASONE 1 DROP: 3; 1 SUSPENSION/ DROPS OPHTHALMIC at 06:53

## 2020-03-04 RX ADMIN — PROPOFOL 120 MG: 10 INJECTION, EMULSION INTRAVENOUS at 07:20

## 2020-03-04 RX ADMIN — FENTANYL CITRATE 100 MCG: 50 INJECTION INTRAMUSCULAR; INTRAVENOUS at 07:17

## 2020-03-04 RX ADMIN — PHENYLEPHRINE HYDROCHLORIDE 1 DROP: 100 SOLUTION/ DROPS OPHTHALMIC at 06:53

## 2020-03-04 RX ADMIN — PHENYLEPHRINE HYDROCHLORIDE 1 DROP: 100 SOLUTION/ DROPS OPHTHALMIC at 06:38

## 2020-03-04 RX ADMIN — TROPICAMIDE 1 DROP: 10 SOLUTION/ DROPS OPHTHALMIC at 06:19

## 2020-03-04 RX ADMIN — TROPICAMIDE 1 DROP: 10 SOLUTION/ DROPS OPHTHALMIC at 06:53

## 2020-03-04 RX ADMIN — TROPICAMIDE 1 DROP: 10 SOLUTION/ DROPS OPHTHALMIC at 06:38

## 2020-03-04 RX ADMIN — PHENYLEPHRINE HYDROCHLORIDE 1 DROP: 100 SOLUTION/ DROPS OPHTHALMIC at 06:20

## 2020-03-04 RX ADMIN — SODIUM CHLORIDE, POTASSIUM CHLORIDE, SODIUM LACTATE AND CALCIUM CHLORIDE: 600; 310; 30; 20 INJECTION, SOLUTION INTRAVENOUS at 06:40

## 2020-03-04 ASSESSMENT — PULMONARY FUNCTION TESTS
PIF_VALUE: 0

## 2020-03-04 ASSESSMENT — PAIN SCALES - GENERAL
PAINLEVEL_OUTOF10: 0

## 2020-03-04 ASSESSMENT — PAIN - FUNCTIONAL ASSESSMENT: PAIN_FUNCTIONAL_ASSESSMENT: 0-10

## 2020-03-04 NOTE — ANESTHESIA PRE PROCEDURE
Department of Anesthesiology  Preprocedure Note       Name:  Twan Cullen   Age:  60 y.o.  :  1955                                          MRN:  [id number]         Date:  3/4/2020      Surgeon: Andressa Mac):  Mary Beth Knox MD    Procedure: VITRECTOMY 25 GAUGE, GAS FLUID EXCHANGE, LASER (Left )    Medications prior to admission:   Prior to Admission medications    Medication Sig Start Date End Date Taking? Authorizing Provider   polyethylene glycol (GLYCOLAX) packet Take 17 g by mouth nightly   Yes Historical Provider, MD   NONFORMULARY mylan/levothyrox 0.175 mg daily   Yes Historical Provider, MD   lovastatin (MEVACOR) 40 MG tablet Take 40 mg by mouth daily   Yes Historical Provider, MD   hydrALAZINE (APRESOLINE) 50 MG tablet Take 50 mg by mouth 3 times daily   Yes Historical Provider, MD   metoprolol tartrate (LOPRESSOR) 25 MG tablet Take 25 mg by mouth daily   Yes Historical Provider, MD   valsartan-hydrochlorothiazide (DIOVAN-HCT) 320-25 MG per tablet Take 1 tablet by mouth daily 160/12.5 - daily   Yes Historical Provider, MD   aspirin 81 MG tablet Take 81 mg by mouth daily Indications: LAST DOSE 2020    Yes Historical Provider, MD   Ascorbic Acid (VITAMIN C) 500 MG CAPS Take by mouth daily   Yes Historical Provider, MD   Testosterone Cypionate 200 MG/ML SOLN Inject as directed Indications: HAS TAKEN IN 6 WEEKS One injection every 2 weeks     Historical Provider, MD   Omega-3 Fatty Acids (FISH OIL PO) Take 1 capsule by mouth 3 times daily Indications: LAST DOSE 2020     Historical Provider, MD       Current medications:    No current facility-administered medications for this encounter. Allergies:  No Known Allergies    Problem List:    Patient Active Problem List   Diagnosis Code    Ureteral calculus N20.1       Past Medical History:        Diagnosis Date    Acrom[address]alia ([facility] [address]     Arthritis     H/O echocardiogram 2017    poor image quality. EF 60%.   Moderate LV hypertrophy 03/03/20    BMI:   Wt Readings from Last 3 Encounters:   03/03/20 288 lb (130.6 kg)   11/18/19 (!) 324 lb (147 kg)   05/14/19 (!) 326 lb (147.9 kg)     Body mass index is 39.06 kg/m². CBC:   Lab Results   Component Value Date    WBC 9.6 07/25/2017    RBC 4.68 07/25/2017    HGB 13.6 11/11/2017    HCT 40.7 11/11/2017    MCV 81.8 07/25/2017    RDW 13.5 07/25/2017     07/25/2017       CMP:   Lab Results   Component Value Date     07/25/2017    K 4.0 07/25/2017    CL 94 07/25/2017    CO2 27 07/25/2017    BUN 18 11/11/2017    CREATININE 1.20 03/04/2020    CREATININE 0.93 11/11/2017    GFRAA >60 11/11/2017    LABGLOM >60 03/04/2020    GLUCOSE 115 07/25/2017    PROT 7.5 07/25/2017    CALCIUM 10.1 07/25/2017    BILITOT 1.10 07/25/2017    ALKPHOS 81 07/25/2017    AST 27 07/25/2017    ALT 41 07/25/2017       POC Tests:   Recent Labs     03/04/20  0635   POCGLU 90   POCK 3.8       Coags:   Lab Results   Component Value Date    PROTIME 10.0 07/25/2017    INR 1.0 07/25/2017    APTT 29.4 07/25/2017       HCG (If Applicable): No results found for: PREGTESTUR, PREGSERUM, HCG, HCGQUANT     ABGs: No results found for: PHART, PO2ART, VHP4TNT, QQK8NKM, BEART, D2ERIGXV     Type & Screen (If Applicable):  No results found for: [facility]    Anesthesia Evaluation  Patient summary reviewed  Airway: Mallampati: III  TM distance: >3 FB   Neck ROM: full  Mouth opening: > = 3 FB Dental:    (+) caps      Pulmonary:normal exam    (+) sleep apnea:                             Cardiovascular:    (+) hypertension:,                   Neuro/Psych:   Negative Neuro/Psych ROS              GI/Hepatic/Renal: Neg GI/Hepatic/Renal ROS            Endo/Other: Negative Endo/Other ROS                    Abdominal:   (+) obese,         Vascular:                                      Anesthesia Plan      MAC     ASA 3       Induction: intravenous. MIPS: Postoperative opioids intended.   Anesthetic plan and risks discussed with patient and spouse. Plan discussed with CRNA.                 Billy Ramos MD   3/4/2020

## 2020-03-04 NOTE — H&P
Sleep apnea, Thyroid disease, and Ureteral stone. Past Surgical History   has a past surgical history that includes vascular surgery; Cataract removal (Right); other surgical history; Colonoscopy (2014); Cystoscopy (Right, 03/10/2017); Hip Arthroplasty (Right); Total knee arthroplasty (Bilateral); and Total shoulder arthroplasty (Bilateral). Social History   reports that he has never smoked. He has never used smokeless tobacco.   reports current alcohol use. reports no history of drug use. Marital Status    Occupation disability   Family History  Family Status   Relation Name Status    Mother     Olga Garibay Father       family history includes Alzheimer's Disease in his mother; Diabetes in his mother. OBJECTIVE:   VITALS:  height is 6' (1.829 m) and weight is 288 lb (130.6 kg). CONSTITUTIONAL:Alert and orientated to person, place and time. No acute distress. Friendly, quiet affect. Enlarged facial features, tall (history of acromegaly)  SKIN:  Warm & dry, no rashes on exposed skin  HEENT: HEAD: Normocephalic, atraumatic        EYES:  PERRL, EOMs intact, conjunctiva clear,      EARS:  Equal bilaterally, no edema or thickening, skin is intact without lumps or lesions. No discharge. NOSE:  Nares patent, septum midline, no rhinorrhea      MOUTH/THROAT:  Mucous membranes moist, tongue is pink, uvula midline, teeth appear to be intact  NECK:  Supple, no lymphadenopathy, full ROM  LUNGS: Respirations even and non-labored. Clear to auscultation bilaterally, no wheezes/rales/rhonchi   CARDIOVASCULAR: regular rate and rhythm, no murmurs/rubs/gallops   ABDOMEN: soft, non-tender, non-distended, bowel sounds active x 4   MUSCULOSKELETAL: Full ROM bilateral upper extremities, is s/p prior bilateral shoulder replacement. Full ROM bilateral lower extremities. Strength 5/5 bilateral lower extremities. VASCULAR:  Brisk cap refill bilateral fingers. Radial pulses are intact, 2+ bilaterally.   Dorsalis

## 2020-03-05 NOTE — OP NOTE
[address] [facility]                                OPERATIVE REPORT    PATIENT NAME: Silverio Sterling                    :        1955  MED REC NO:   [id number]                             ROOM:  ACCOUNT NO:   [de-identified]                           ADMIT DATE: 2020  PROVIDER:     Saige Gómez    DATE OF PROCEDURE:  2020    PREOPERATIVE DIAGNOSES:  1. Rhegmatogenous retinal detachment, left eye, multiple breaks. 2.  Cataract, left eye. POSTOPERATIVE DIAGNOSES:  1. Rhegmatogenous retinal detachment, left eye, multiple breaks. 2.  Cataract, left eye. PROCEDURE:  Pars plana vitrectomy for repair of rhegmatogenous retinal  detachment, left eye. ANESTHESIA:  Local with monitored anesthesia care. COMPLICATIONS:  None. BLOOD LOSS:  None. SURGEON:  Saige Gómez MD    INDICATIONS:  The patient is a very pleasant 79-year-old with a history  of previous treatment for retinal tears, left eye, and subsequent  secondary vitreous hemorrhage from a torn bridging vessel. His  hemorrhage was clearing, but he noted recurrent vision loss over the  weekend. He was examined preoperatively by Dr. Darius Archer on  and  noted to have a new superior retinal detachment in the left eye that did  not involve the macula. Recommendations were made for urgent  vitreoretinal surgery in an effort to achieve retinal re-attachment. Surgical risks and benefits were discussed. Informed consent was  discussed and obtained. He has a cataract and will undoubtedly  experience cataract progression postoperatively and ultimately need  cataract surgery to maximize his visual function in the left eye. Risk  of recurrent retinal detachment was discussed, which may be higher than  most given his myopia. TECHNIQUE:  The patient was placed in the supine position on the  Operating Room table.   After the induction of intravenous access and  cardiac monitoring by the Anesthesia Service, the surgical eye was given  a retrobulbar block consisting of 3.5 cc of a 50/50 mixture of 2%  lidocaine and 0.75% Marcaine. A modified Prospect facial nerve block was  also performed on the same side. Adequate akinesia and anesthesia was  obtained. The surgical eye was prepped and draped in the usual sterile manner for  vitreoretinal surgery. The lid speculum was placed. In the  inferotemporal quadrant, a #25 gauge trocar was used to place the  cannula 3.5 mm posterior to the limbus. The infusion line was placed  into the eye and the tip of the cannula was viewed through the pupil and  noted to be in the vitreous cavity, and the infusion was turned on. Two  additional #25 gauge trocars were placed superiorly, one in the  superotemporal and one in the superonasal quadrant, and each was exactly  3.5 mm posterior to the limbus. The vitreous instrumentation was checked and noted to be in good working  order. Using the operating microscope and indirect (BIOM) viewing  system, the light pipe and suction cutter were placed into the anterior  mid vitreous cavity, and a core vitrectomy was performed. There was significant vitreous hemorrhage present centrally and  inferiorly, and this was carefully removed. Scleral depression was used  to facilitate removal of peripheral vitreous, blood in the peripheral  vitreous, and great care was taken to relieve all traction from the  large superotemporal retinal break, within which there was a torn  bridging vessel. The bridging vessel was diathermized on both sides. There was second new break at the 12 o'clock periphery. Once peripheral  vitrectomy was completed and all traction released from the peripheral  retina, then a site for drainage was selected in the posterior aspect of  the superior detachment, and a retinotomy was made with intraocular  diathermy.   The soft-tip cannula

## 2020-07-09 ENCOUNTER — HOSPITAL ENCOUNTER (OUTPATIENT)
Dept: PREADMISSION TESTING | Age: 65
Setting detail: SPECIMEN
Discharge: HOME | End: 2020-07-13
Payer: COMMERCIAL

## 2020-07-09 PROCEDURE — U0004 COV-19 TEST NON-CDC HGH THRU: HCPCS

## 2020-07-10 LAB
SARS-COV-2, PCR: NOT DETECTED
SARS-COV-2, RAPID: NORMAL
SARS-COV-2: NORMAL
SOURCE: NORMAL

## 2020-07-12 PROBLEM — H25.12 AGE-RELATED NUCLEAR CATARACT OF LEFT EYE: Chronic | Status: ACTIVE | Noted: 2020-07-12

## 2020-07-13 ENCOUNTER — HOSPITAL ENCOUNTER (OUTPATIENT)
Age: 65
Setting detail: OUTPATIENT SURGERY
Discharge: HOME | End: 2020-07-13
Attending: OPHTHALMOLOGY | Admitting: OPHTHALMOLOGY
Payer: COMMERCIAL

## 2020-07-13 VITALS
RESPIRATION RATE: 18 BRPM | OXYGEN SATURATION: 97 % | BODY MASS INDEX: 40.63 KG/M2 | DIASTOLIC BLOOD PRESSURE: 94 MMHG | WEIGHT: 300 LBS | HEIGHT: 72 IN | SYSTOLIC BLOOD PRESSURE: 129 MMHG | HEART RATE: 67 BPM | TEMPERATURE: 97.2 F

## 2020-07-13 PROBLEM — H25.12 AGE-RELATED NUCLEAR CATARACT OF LEFT EYE: Chronic | Status: RESOLVED | Noted: 2020-07-12 | Resolved: 2020-07-13

## 2020-07-13 PROCEDURE — 6370000000 HC RX 637 (ALT 250 FOR IP): Performed by: OPHTHALMOLOGY

## 2020-07-13 PROCEDURE — 2500000003 HC RX 250 WO HCPCS: Performed by: OPHTHALMOLOGY

## 2020-07-13 PROCEDURE — 3600000003 HC SURGERY LEVEL 3 BASE: Performed by: OPHTHALMOLOGY

## 2020-07-13 PROCEDURE — 2580000003 HC RX 258: Performed by: OPHTHALMOLOGY

## 2020-07-13 PROCEDURE — 7100000010 HC PHASE II RECOVERY - FIRST 15 MIN: Performed by: OPHTHALMOLOGY

## 2020-07-13 PROCEDURE — V2787 ASTIGMATISM-CORRECT FUNCTION: HCPCS | Performed by: OPHTHALMOLOGY

## 2020-07-13 PROCEDURE — 3600000013 HC SURGERY LEVEL 3 ADDTL 15MIN: Performed by: OPHTHALMOLOGY

## 2020-07-13 PROCEDURE — 6360000002 HC RX W HCPCS: Performed by: OPHTHALMOLOGY

## 2020-07-13 PROCEDURE — 2709999900 HC NON-CHARGEABLE SUPPLY: Performed by: OPHTHALMOLOGY

## 2020-07-13 PROCEDURE — 99153 MOD SED SAME PHYS/QHP EA: CPT | Performed by: OPHTHALMOLOGY

## 2020-07-13 PROCEDURE — 99152 MOD SED SAME PHYS/QHP 5/>YRS: CPT | Performed by: OPHTHALMOLOGY

## 2020-07-13 PROCEDURE — 7100000011 HC PHASE II RECOVERY - ADDTL 15 MIN: Performed by: OPHTHALMOLOGY

## 2020-07-13 DEVICE — IMPLANTABLE DEVICE: Type: IMPLANTABLE DEVICE | Site: EYE | Status: FUNCTIONAL

## 2020-07-13 RX ORDER — TROPICAMIDE 10 MG/ML
1 SOLUTION/ DROPS OPHTHALMIC SEE ADMIN INSTRUCTIONS
Status: DISCONTINUED | OUTPATIENT
Start: 2020-07-13 | End: 2020-07-13 | Stop reason: HOSPADM

## 2020-07-13 RX ORDER — SODIUM CHLORIDE 0.9 % (FLUSH) 0.9 %
10 SYRINGE (ML) INJECTION EVERY 12 HOURS SCHEDULED
Status: DISCONTINUED | OUTPATIENT
Start: 2020-07-13 | End: 2020-07-13 | Stop reason: HOSPADM

## 2020-07-13 RX ORDER — TESTOSTERONE 20.25 MG/1.25G
2 GEL TOPICAL DAILY
COMMUNITY

## 2020-07-13 RX ORDER — FENTANYL CITRATE 50 UG/ML
INJECTION, SOLUTION INTRAMUSCULAR; INTRAVENOUS PRN
Status: DISCONTINUED | OUTPATIENT
Start: 2020-07-13 | End: 2020-07-13 | Stop reason: ALTCHOICE

## 2020-07-13 RX ORDER — MIDAZOLAM HYDROCHLORIDE 1 MG/ML
INJECTION INTRAMUSCULAR; INTRAVENOUS PRN
Status: DISCONTINUED | OUTPATIENT
Start: 2020-07-13 | End: 2020-07-13 | Stop reason: ALTCHOICE

## 2020-07-13 RX ORDER — SODIUM CHLORIDE 0.9 % (FLUSH) 0.9 %
10 SYRINGE (ML) INJECTION PRN
Status: DISCONTINUED | OUTPATIENT
Start: 2020-07-13 | End: 2020-07-13 | Stop reason: HOSPADM

## 2020-07-13 RX ORDER — TETRACAINE HYDROCHLORIDE 5 MG/ML
1 SOLUTION OPHTHALMIC SEE ADMIN INSTRUCTIONS
Status: DISCONTINUED | OUTPATIENT
Start: 2020-07-13 | End: 2020-07-13 | Stop reason: HOSPADM

## 2020-07-13 RX ORDER — LIDOCAINE HYDROCHLORIDE 10 MG/ML
INJECTION, SOLUTION EPIDURAL; INFILTRATION; INTRACAUDAL; PERINEURAL PRN
Status: DISCONTINUED | OUTPATIENT
Start: 2020-07-13 | End: 2020-07-13 | Stop reason: ALTCHOICE

## 2020-07-13 RX ORDER — PROPARACAINE HYDROCHLORIDE 5 MG/ML
1 SOLUTION/ DROPS OPHTHALMIC SEE ADMIN INSTRUCTIONS
Status: DISCONTINUED | OUTPATIENT
Start: 2020-07-13 | End: 2020-07-13 | Stop reason: HOSPADM

## 2020-07-13 RX ORDER — TETRACAINE HYDROCHLORIDE 5 MG/ML
SOLUTION OPHTHALMIC PRN
Status: DISCONTINUED | OUTPATIENT
Start: 2020-07-13 | End: 2020-07-13 | Stop reason: ALTCHOICE

## 2020-07-13 RX ORDER — ASPIRIN 81 MG/1
81 TABLET ORAL DAILY
COMMUNITY

## 2020-07-13 RX ORDER — SODIUM CHLORIDE 9 MG/ML
INJECTION, SOLUTION INTRAVENOUS CONTINUOUS
Status: DISCONTINUED | OUTPATIENT
Start: 2020-07-13 | End: 2020-07-13 | Stop reason: HOSPADM

## 2020-07-13 RX ORDER — PHENYLEPHRINE HCL 2.5 %
1 DROPS OPHTHALMIC (EYE) SEE ADMIN INSTRUCTIONS
Status: DISCONTINUED | OUTPATIENT
Start: 2020-07-13 | End: 2020-07-13 | Stop reason: HOSPADM

## 2020-07-13 RX ADMIN — TROPICAMIDE 1 DROP: 10 SOLUTION/ DROPS OPHTHALMIC at 09:12

## 2020-07-13 RX ADMIN — PHENYLEPHRINE HYDROCHLORIDE 1 DROP: 25 SOLUTION/ DROPS OPHTHALMIC at 09:27

## 2020-07-13 RX ADMIN — TROPICAMIDE 1 DROP: 10 SOLUTION/ DROPS OPHTHALMIC at 09:18

## 2020-07-13 RX ADMIN — PHENYLEPHRINE HYDROCHLORIDE 1 DROP: 25 SOLUTION/ DROPS OPHTHALMIC at 09:18

## 2020-07-13 RX ADMIN — SODIUM CHLORIDE: 9 INJECTION, SOLUTION INTRAVENOUS at 09:18

## 2020-07-13 RX ADMIN — PROPARACAINE HYDROCHLORIDE 1 DROP: 5 SOLUTION/ DROPS OPHTHALMIC at 09:10

## 2020-07-13 RX ADMIN — PROPARACAINE HYDROCHLORIDE 1 DROP: 5 SOLUTION/ DROPS OPHTHALMIC at 09:27

## 2020-07-13 RX ADMIN — PROPARACAINE HYDROCHLORIDE 1 DROP: 5 SOLUTION/ DROPS OPHTHALMIC at 09:18

## 2020-07-13 RX ADMIN — TROPICAMIDE 1 DROP: 10 SOLUTION/ DROPS OPHTHALMIC at 09:27

## 2020-07-13 RX ADMIN — PHENYLEPHRINE HYDROCHLORIDE 1 DROP: 25 SOLUTION/ DROPS OPHTHALMIC at 09:11

## 2020-07-13 ASSESSMENT — PAIN SCALES - GENERAL: PAINLEVEL_OUTOF10: 0

## 2020-07-13 NOTE — H&P
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 The patient complains of decreased vision, glare and halos around lights, and trouble with vision for activities of daily living. Past Medical History:   Diagnosis Date    Acromegalia ([address]     Arthritis     H/O echocardiogram 09/13/2017    poor image quality. EF 60%. Moderate LV hypertrophy normal LV cavity size. Unable to assess specific wall motion abnormalities due to image quality. No significant valvular abnormalities. Mild diastolic dysfunction is seen.  History of stress test     25+ years ago. per patient it was normal    History of stress test 09/14/2017    Equivocal study. Moderate perfusion defect of moderate intensity in the inferolateral and inferior regions during stress and rest imaging. EF  71% without regional wall motion abnormalities. No significant electrocardiographic evidence of MI during EKG Vu Treadmill score 6,low risk for CAD.  Hyperlipidemia     Hypertension     Left retinal detachment     Sleep apnea     C-PAP    Thyroid disease     HYPOTHYROID    Ureteral stone     history       Past Surgical History:   Procedure Laterality Date    CATARACT REMOVAL Right     COLONOSCOPY  2014    CYSTOSCOPY Right 03/10/2017    HIP ARTHROPLASTY Right     KOLE    OTHER SURGICAL HISTORY      CAROTID TUMOR REMOVAL    SHOULDER ARTHROPLASTY Bilateral     TOTAL KNEE ARTHROPLASTY Bilateral     VASCULAR SURGERY      VITRECTOMY Left 03/04/2020    VITRECTOMY Left 3/4/2020    VITRECTOMY 25 GAUGE, AIR FLUID EXCHANGE, ENDOLASER 200MW 200MS 607  SPOTS , AIR GAS EXCHANGE WITH 20 % SF6 performed by Pako Levine MD at [facility] meds:   Prior to Admission medications    Medication Sig Start Date End Date Taking?  Authorizing Provider   polyethylene glycol (GLYCOLAX) packet Take 17 g by mouth nightly    Historical Provider, MD

## 2020-07-13 NOTE — OP NOTE
OPERATIVE NOTE      Patient:  Kasi Chávez    YOB: 1955    Account #:  [de-identified]    Date:  7/13/2020    Surgeon:  Oly Mares DO    Primary Care Physician:No primary care provider on file. Preoperative Diagnosis: Age Related Nuclear Cataract- left eye    Postoperative Diagnosis: Same    Procedure: Phacoemulsification with intraocular lens implantation, left eye    Anesthesia: Topical and intracameral anesthesia with intravenous sedation    Complications: none    Specimens: none    Indications for procedure: The patient is a 60y.o. year old with decreased vision, glare and halos around lights, and trouble with activities of daily living. Examination revealed a visually significant cataract in the left eye. Other eye diseases have been ruled out as the primary cause of decreased visual function. Significant improvement is expected in the patient's visual acuity and/or visual function from the removal of the cataract. Risks, benefits, and alternatives to surgery were discussed with the patient and the patient elected to proceed with phacoemulsification with lens implantation. Details of the procedure: Following informed consent, the patient was identified by name and identification tag in the holding area,taken to the operating room and placed in the supine position. A time out was performed by all present in the room to identify the patient, the eye to be operated on, the correct operative procedure, and the correct intraocular lens. Monitoring leads were placed. The patient was positioned. An eyelid prep of half-strength Betadine was performed. The patient was administered intravenous sedation if desired and topical anesthetic was placed in the operative eye. The eye prepped a second time and draped in the usual sterile fashion using aseptic technique for cataract surgery. A lid speculum was then inserted. [place] was placed onto the corneal surface.   A stab incision was

## 2020-07-13 NOTE — PROGRESS NOTES

## 2020-07-13 NOTE — H&P
I have examined the patient and reviewed the history and physical completed on 7/12/20 and find no relevant changes. I have reviewed with the patient and/or family the risks, benefits, and alternatives to the procedure and they have agreed to proceed.     Erasmo Murray  7/13/2020  9:27 AM

## 2020-10-28 ENCOUNTER — HOSPITAL ENCOUNTER (OUTPATIENT)
Dept: GENERAL RADIOLOGY | Age: 65
Discharge: HOME | End: 2020-10-30
Payer: COMMERCIAL

## 2020-10-28 ENCOUNTER — HOSPITAL ENCOUNTER (OUTPATIENT)
Age: 65
Discharge: HOME | End: 2020-10-30
Payer: COMMERCIAL

## 2020-10-28 PROCEDURE — 74018 RADEX ABDOMEN 1 VIEW: CPT

## 2020-11-09 ENCOUNTER — OFFICE VISIT (OUTPATIENT)
Dept: UROLOGY | Age: 65
End: 2020-11-09
Payer: COMMERCIAL

## 2020-11-09 ENCOUNTER — HOSPITAL ENCOUNTER (OUTPATIENT)
Age: 65
Setting detail: SPECIMEN
Discharge: HOME | End: 2020-11-09
Payer: COMMERCIAL

## 2020-11-09 VITALS
BODY MASS INDEX: 42.77 KG/M2 | DIASTOLIC BLOOD PRESSURE: 98 MMHG | HEART RATE: 72 BPM | WEIGHT: 311 LBS | SYSTOLIC BLOOD PRESSURE: 155 MMHG

## 2020-11-09 LAB
-: ABNORMAL
AMORPHOUS: ABNORMAL
BACTERIA: ABNORMAL
BILIRUBIN URINE: NEGATIVE
CASTS UA: ABNORMAL /LPF
COLOR: YELLOW
COMMENT UA: ABNORMAL
CRYSTALS, UA: ABNORMAL /HPF
EPITHELIAL CELLS UA: ABNORMAL /HPF (ref 0–5)
GLUCOSE URINE: NEGATIVE
KETONES, URINE: NEGATIVE
LEUKOCYTE ESTERASE, URINE: NEGATIVE
MUCUS: ABNORMAL
NITRITE, URINE: NEGATIVE
OTHER OBSERVATIONS UA: ABNORMAL
PH UA: 5.5 (ref 5–9)
PROTEIN UA: NEGATIVE
RBC UA: ABNORMAL /HPF (ref 0–2)
RENAL EPITHELIAL, UA: ABNORMAL /HPF
SPECIFIC GRAVITY UA: >1.03 (ref 1.01–1.02)
TRICHOMONAS: ABNORMAL
TURBIDITY: CLEAR
URINE HGB: NEGATIVE
UROBILINOGEN, URINE: NORMAL
WBC UA: ABNORMAL /HPF (ref 0–5)
YEAST: ABNORMAL

## 2020-11-09 PROCEDURE — 87086 URINE CULTURE/COLONY COUNT: CPT

## 2020-11-09 PROCEDURE — 99215 OFFICE O/P EST HI 40 MIN: CPT | Performed by: NURSE PRACTITIONER

## 2020-11-09 PROCEDURE — 81001 URINALYSIS AUTO W/SCOPE: CPT

## 2020-11-09 NOTE — PROGRESS NOTES
HPI:          Patient is a 63 y.o. male in no acute distress. He is alert and oriented to person, place, and time. History  Acromegalia - follows with endocrinology    3/2017 right HLL for 8mm ureteral stone      Today  Today for a 1 year follow-up for history of stones. He denies any flank or abdominal pain. He denies any episodes of dysuria or gross hematuria. He did have a KUB completed prior to today's visit. This does show a 7 mm right renal stone. This is larger compared to 1 year ago. Past Medical History:   Diagnosis Date    Acromegalia ([address]     Arthritis     H/O echocardiogram 09/13/2017    poor image quality. EF 60%. Moderate LV hypertrophy normal LV cavity size. Unable to assess specific wall motion abnormalities due to image quality. No significant valvular abnormalities. Mild diastolic dysfunction is seen.  History of stress test     25+ years ago. per patient it was normal    History of stress test 09/14/2017    Equivocal study. Moderate perfusion defect of moderate intensity in the inferolateral and inferior regions during stress and rest imaging. EF  71% without regional wall motion abnormalities. No significant electrocardiographic evidence of MI during EKG Vu Treadmill score 6,low risk for CAD.     Hyperlipidemia     Hypertension     Left retinal detachment     Sleep apnea     C-PAP    Thyroid disease     HYPOTHYROID    Ureteral stone     history     Past Surgical History:   Procedure Laterality Date    CATARACT REMOVAL Right     CATARACT REMOVAL WITH IMPLANT Left 07/13/2020    [facility]/    COLONOSCOPY  2014    CYSTOSCOPY Right 03/10/2017    HIP ARTHROPLASTY Right     KOLE    INTRACAPSULAR CATARACT EXTRACTION Left 7/13/2020    EYE CATARACT EMULSIFICATION IOL IMPLANT performed by Armando Magana DO at [facility] TUMOR REMOVAL    SHOULDER ARTHROPLASTY Bilateral     TOTAL KNEE ARTHROPLASTY Bilateral     VASCULAR SURGERY      VITRECTOMY Left 03/04/2020    VITRECTOMY Left 3/4/2020    VITRECTOMY 25 GAUGE, AIR FLUID EXCHANGE, ENDOLASER 200MW 200MS 607  SPOTS , AIR GAS EXCHANGE WITH 20 % SF6 performed by Lacie Yung MD at [facility] Encounter Medications as of 11/9/2020   Medication Sig Dispense Refill    aspirin 81 MG EC tablet Take 81 mg by mouth daily      Testosterone 1.62 % GEL Place 2 Pump onto the skin daily      polyethylene glycol (GLYCOLAX) packet Take 17 g by mouth nightly      NONFORMULARY mylan/levothyrox 0.175 mg daily      lovastatin (MEVACOR) 40 MG tablet Take 40 mg by mouth daily      hydrALAZINE (APRESOLINE) 50 MG tablet Take 50 mg by mouth 3 times daily      metoprolol tartrate (LOPRESSOR) 25 MG tablet Take 25 mg by mouth daily      valsartan-hydrochlorothiazide (DIOVAN-HCT) 320-25 MG per tablet Take 1 tablet by mouth daily 160/12.5 - daily      Ascorbic Acid (VITAMIN C) 500 MG CAPS Take by mouth daily      Omega-3 Fatty Acids (FISH OIL PO) Take 1 capsule by mouth 3 times daily Indications: LAST DOSE 2/27/2020        No facility-administered encounter medications on file as of 11/9/2020.        Current Outpatient Medications on File Prior to Visit   Medication Sig Dispense Refill    aspirin 81 MG EC tablet Take 81 mg by mouth daily      Testosterone 1.62 % GEL Place 2 Pump onto the skin daily      polyethylene glycol (GLYCOLAX) packet Take 17 g by mouth nightly      NONFORMULARY mylan/levothyrox 0.175 mg daily      lovastatin (MEVACOR) 40 MG tablet Take 40 mg by mouth daily      hydrALAZINE (APRESOLINE) 50 MG tablet Take 50 mg by mouth 3 times daily      metoprolol tartrate (LOPRESSOR) 25 MG tablet Take 25 mg by mouth daily      valsartan-hydrochlorothiazide (DIOVAN-HCT) 320-25 MG per tablet Take 1 tablet by mouth daily 160/12.5 - daily      Ascorbic Acid (VITAMIN C) 500 MG CAPS Take by mouth daily      Omega-3 Fatty Acids (FISH OIL PO) Take 1 capsule by mouth 3 times daily Indications: LAST DOSE 2/27/2020        No current facility-administered medications on file prior to visit. Patient has no known allergies. Family History   Problem Relation Age of Onset    Diabetes Mother     Alzheimer's Disease Mother      Social History     Tobacco Use   Smoking Status Never Smoker   Smokeless Tobacco Never Used       Social History     Substance and Sexual Activity   Alcohol Use Yes    Comment: occ       Review of Systems   Constitutional: Negative for appetite change, chills and fever. Eyes: Negative for redness and visual disturbance. Respiratory: Negative for cough, shortness of breath and wheezing. Cardiovascular: Negative for chest pain and leg swelling. Gastrointestinal: Negative for abdominal pain, constipation, nausea and vomiting. Genitourinary: Negative for decreased urine volume, difficulty urinating, discharge, dysuria, enuresis, flank pain, frequency, hematuria, penile pain, scrotal swelling, testicular pain and urgency. Musculoskeletal: Negative for back pain, joint swelling and myalgias. Skin: Negative for color change, rash and wound. Neurological: Negative for dizziness, tremors and numbness. Hematological: Negative for adenopathy. Does not bruise/bleed easily. BP (!) 155/98 (Site: Right Upper Arm, Position: Sitting, Cuff Size: Medium Adult)   Pulse 72   Wt (!) 311 lb (141.1 kg)   BMI 42.77 kg/m²       PHYSICAL EXAM:  Constitutional: Patient in no acute distress; Neuro: alert and oriented to person place and time. Psych: Mood and affect normal.  Skin: Normal  Lungs: Respiratory effort normal  Cardiovascular:  Normal peripheral pulses  Abdomen: Soft, non-tender, non-distended with no CVA, flank pain  Bladder non-tender and not distended.   Lymphatics: no palpable lymphadenopathy      Lab Results   Component Value Date    BUN 18 11/11/2017     Lab Results   Component Value Date    CREATININE 1.20 (H) 03/04/2020     Lab Results Component Value Date    PSA 0.59 05/23/2019    PSA 0.11 05/01/2018       ASSESSMENT:   Diagnosis Orders   1. Kidney stones  Culture, Urine    Urinalysis With Microscopic         PLAN:  Stone visible on KUB. Discussed risks and benefits of ESWL and stent placement (including bleeding, infection, injury to  tract, renal hematoma, and need for multiple procedures such as stent placement, subsequent HLL or repeat ESWL), details of procedure, and what to expect post-op. Patient does understand that this procedure will fragment the stone, these fragments will need to pass. Fragment passage will be associated with gross hematuria and flank pain. Patient amenable to schedule RIGHT ESWL.

## 2020-11-10 LAB
CULTURE: NO GROWTH
Lab: NORMAL
SPECIMEN DESCRIPTION: NORMAL

## 2020-11-11 ENCOUNTER — TELEPHONE (OUTPATIENT)
Dept: UROLOGY | Age: 65
End: 2020-11-11

## 2020-11-11 NOTE — TELEPHONE ENCOUNTER
UACS is negative for infection    Labs reviewed. No further labs needed for surgery.     He does need an EKG

## 2020-11-17 ENCOUNTER — HOSPITAL ENCOUNTER (OUTPATIENT)
Dept: PREADMISSION TESTING | Age: 65
Discharge: HOME | End: 2020-11-21
Attending: UROLOGY
Payer: COMMERCIAL

## 2020-11-17 ENCOUNTER — HOSPITAL ENCOUNTER (OUTPATIENT)
Dept: PREADMISSION TESTING | Age: 65
Setting detail: SPECIMEN
Discharge: HOME | End: 2020-11-21
Payer: COMMERCIAL

## 2020-11-17 VITALS
RESPIRATION RATE: 20 BRPM | HEIGHT: 71 IN | WEIGHT: 308.7 LBS | HEART RATE: 76 BPM | TEMPERATURE: 97.1 F | OXYGEN SATURATION: 97 % | SYSTOLIC BLOOD PRESSURE: 124 MMHG | DIASTOLIC BLOOD PRESSURE: 93 MMHG | BODY MASS INDEX: 43.22 KG/M2

## 2020-11-17 LAB
EKG ATRIAL RATE: 70 BPM
EKG P AXIS: 1 DEGREES
EKG P-R INTERVAL: 196 MS
EKG Q-T INTERVAL: 412 MS
EKG QRS DURATION: 112 MS
EKG QTC CALCULATION (BAZETT): 444 MS
EKG R AXIS: -39 DEGREES
EKG T AXIS: -2 DEGREES
EKG VENTRICULAR RATE: 70 BPM

## 2020-11-17 PROCEDURE — 93010 ELECTROCARDIOGRAM REPORT: CPT | Performed by: FAMILY MEDICINE

## 2020-11-17 PROCEDURE — C9803 HOPD COVID-19 SPEC COLLECT: HCPCS

## 2020-11-17 PROCEDURE — 93005 ELECTROCARDIOGRAM TRACING: CPT

## 2020-11-17 PROCEDURE — U0003 INFECTIOUS AGENT DETECTION BY NUCLEIC ACID (DNA OR RNA); SEVERE ACUTE RESPIRATORY SYNDROME CORONAVIRUS 2 (SARS-COV-2) (CORONAVIRUS DISEASE [COVID-19]), AMPLIFIED PROBE TECHNIQUE, MAKING USE OF HIGH THROUGHPUT TECHNOLOGIES AS DESCRIBED BY CMS-2020-01-R: HCPCS

## 2020-11-17 NOTE — PROGRESS NOTES
[facility]   Preadmission Testing    Name: Sunshine Lockhart  : 1955  Patient Phone: [phone] (home) [phone] (work)    Procedure RIGHT ESWL  Date of Procedure: 20  Surgeon: Betina Prado MD    Ht:  5' 11\" (180.3 cm)  Wt: Weight: (!) 308 lb 11.2 oz (140 kg)  Wt method: Actual    Allergies: No Known Allergies    Peanut allergy: No    Latex Allergy Screening Tool  Have you ever had a reaction to or been told by a physician that you have an allergy to latex or natural rubber?: No    Vitals:    20 0901   BP: (!) 124/93   Pulse: 76   Resp: 20   Temp: 97.1 °F (36.2 °C)   SpO2: 97%       No LMP for male patient. Do you take blood thinners? [x] Yes    [] No         Instructed to stop blood thinners prior to procedure? [x] Yes    [] No      [] N/A   Do you have sleep apnea? [x] Yes    [] No     Instructed to bring CPAP machine? [] Yes    [] No    [x] N/A   Do you have acid reflux ? [] Yes    [x] No     Do you have  hiatal hernia? [] Yes    [x] No    Do you ever experience motion sickness? [] Yes    [x] No     Have you had a respiratory infection or sore throat in last 4 weeks before surgery? [] Yes    [x] No     Do you have poorly controlled asthma or COPD? [] Yes    [x] No     Do you have a history of angina in the last month or symptomatic arrhythmia? [] Yes    [x] No     Do you have significant central nervous system disease? [] Yes    [x] No     Have you had an EKG, labs, or chest xray in last 12 months? If yes provide copies to anesthesia   [x] Yes    [] No       [x] Lab    [] EKG    [] CXR     Have you had a stress test?     [x] Yes    [] No    When/where:[facility] 2017    Was it normal?    [] Yes    [] No   Do you or your family have a history of Malignant Hyperthermia?    [] Yes    [x] No               PAT Call/Visit Questions  Person Interviewed: PATIENT  Surgery Time Verified: Yes  Surgery Location Verified: Yes  Patient Language:  [facility] History Reviewed: Yes  NPO Status Reinforced: Yes  Ride and Caregiver Arranged: Yes  Ride Caregiver Provider: WIFE-CHRISTIE    Pre-AdmissionTesting Checklist  Patient has been to this health system before?: Yes  Does patient refuse blood?: No  Healthcare Directive: No, patient does not have an advance directive for healthcare treatment   needed: No  Patient can read and write?: Yes  Meds-to-Beds: Does the patient want to have any new prescriptions delivered to bedside prior to discharge?: No  History given by: Patient  Providing self care at home?: Yes  Discharge transport (for same day patients): Family    Patient instructed on the pre-operative, intra-operative, and post-operative process? Yes  Medication instructions reviewed with patient? Yes  Pre operative instruction sheet reviewed and given to patient in PAT?   Yes

## 2020-11-19 LAB — SARS-COV-2, NAA: NOT DETECTED

## 2020-11-23 ENCOUNTER — ANESTHESIA EVENT (OUTPATIENT)
Dept: OPERATING ROOM | Age: 65
End: 2020-11-23
Payer: COMMERCIAL

## 2020-11-23 PROBLEM — N20.0 RENAL CALCULUS: Status: ACTIVE | Noted: 2020-11-23

## 2020-11-23 ASSESSMENT — ENCOUNTER SYMPTOMS
COLOR CHANGE: 0
COUGH: 0
VOMITING: 0
WHEEZING: 0
BACK PAIN: 0
CONSTIPATION: 0
SHORTNESS OF BREATH: 0
ABDOMINAL PAIN: 0
EYE REDNESS: 0
NAUSEA: 0

## 2020-11-23 NOTE — H&P
History and Physical    Patient:  Js Hong  MRN: [id number]    CHIEF COMPLAINT:  Right flank pain    HISTORY OF PRESENT ILLNESS:   The patient is a 63 y.o. male who presents with right flank pain. Acromegalia - follows with endocrinology     3/2017 right HLL for 8mm ureteral stone        Today  Today for a 1 year follow-up for history of stones. He denies any flank or abdominal pain. He denies any episodes of dysuria or gross hematuria. He did have a KUB completed prior to today's visit. This does show a 7 mm right renal stone. This is larger compared to 1 year ago. Past Medical History:    Past Medical History:   Diagnosis Date    Acromegalia ([address]     Arthritis     H/O echocardiogram 09/13/2017    poor image quality. EF 60%. Moderate LV hypertrophy normal LV cavity size. Unable to assess specific wall motion abnormalities due to image quality. No significant valvular abnormalities. Mild diastolic dysfunction is seen.  History of stress test     25+ years ago. per patient it was normal    History of stress test 09/14/2017    Equivocal study. Moderate perfusion defect of moderate intensity in the inferolateral and inferior regions during stress and rest imaging. EF  71% without regional wall motion abnormalities. No significant electrocardiographic evidence of MI during EKG Vu Treadmill score 6,low risk for CAD.     Hyperlipidemia     Hypertension     Left retinal detachment     Sleep apnea     C-PAP    Thyroid disease     HYPOTHYROID    Ureteral stone     history       Past Surgical History:    Past Surgical History:   Procedure Laterality Date    CATARACT REMOVAL Right     CATARACT REMOVAL WITH IMPLANT Left 07/13/2020    [facility]/    COLONOSCOPY  2014    CYSTOSCOPY Right 03/10/2017    HIP ARTHROPLASTY Right     KOLE    INTRACAPSULAR CATARACT EXTRACTION Left 7/13/2020    EYE CATARACT EMULSIFICATION IOL IMPLANT performed by Miguelangel Young DO at [address] [facility]. Medical: Not on file     Non-medical: Not on file   Tobacco Use    Smoking status: Never Smoker    Smokeless tobacco: Never Used   Substance and Sexual Activity    Alcohol use: Yes     Comment: occ    Drug use: No    Sexual activity: Not on file   Lifestyle    Physical activity     Days per week: Not on file     Minutes per session: Not on file    Stress: Not on file   Relationships    Social connections     Talks on phone: Not on file     Gets together: Not on file     Attends Restorationism service: Not on file     Active member of club or organization: Not on file     Attends meetings of clubs or organizations: Not on file     Relationship status: Not on file    Intimate partner violence     Fear of current or ex partner: Not on file     Emotionally abused: Not on file     Physically abused: Not on file     Forced sexual activity: Not on file   Other Topics Concern    Not on file   Social History Narrative    Not on file       Family History:    Family History   Problem Relation Age of Onset    Diabetes Mother     Alzheimer's Disease Mother        REVIEW OF SYSTEMS:  All systems reviewed and negative except for that already noted in the HPI. Physical Exam:      No data found. Constitutional: Patient in no acute distress; Neuro: alert and oriented to person place and time. Psych: Mood and affect normal.  Skin: Normal  Lungs: Respiratory effort normal  Cardiovascular:  Normal peripheral pulses  Abdomen: Soft, non-tender, non-distended with no CVA, flank pain, hepatosplenomegaly or hernia. Kidneys normal.  Bladder non-tender and not distended. Lymphatics: no palpable lymphadenopathy  Penis normal and circumcised  Urethral meatus normal  Scrotal exam normal  Testicles normal bilaterally  Epididymis normal bilaterally  No evidence of inguinal hernia  Anus and perineum normal  Normal rectal tone with no masses  Prostate soft, non-tender to palpation. No palpable nodules.   Estimated 40 grams. Seminal vesicles not palpable. LABS:   No results for input(s): WBC, HGB, HCT, MCV, PLT in the last 72 hours. No results for input(s): NA, K, CL, CO2, PHOS, BUN, CREATININE in the last 72 hours. Invalid input(s): CA  Lab Results   Component Value Date    PSA 0.59 05/23/2019    PSA 0.11 05/01/2018       Additional Lab/culture results:    Urinalysis: No results for input(s): COLORU, PHUR, LABCAST, WBCUA, RBCUA, MUCUS, TRICHOMONAS, YEAST, BACTERIA, CLARITYU, SPECGRAV, LEUKOCYTESUR, UROBILINOGEN, [place] in the last 72 hours.     Invalid input(s): NITRATE, GLUCOSEUKETONESUAMORPHOUS     -----------------------------------------------------------------  Imaging Results:    Assessment and Plan   Impression:    Patient Active Problem List   Diagnosis    Ureteral calculus    Retinal detachment of left eye with multiple breaks    Renal calculus       Plan: Right ESWL    Oniel Simon  5:13 PM 11/23/2020

## 2020-11-24 ENCOUNTER — TELEPHONE (OUTPATIENT)
Dept: UROLOGY | Age: 65
End: 2020-11-24

## 2020-11-24 ENCOUNTER — HOSPITAL ENCOUNTER (OUTPATIENT)
Age: 65
Setting detail: OUTPATIENT SURGERY
Discharge: HOME | End: 2020-11-24
Attending: UROLOGY | Admitting: UROLOGY
Payer: COMMERCIAL

## 2020-11-24 ENCOUNTER — ANESTHESIA (OUTPATIENT)
Dept: OPERATING ROOM | Age: 65
End: 2020-11-24
Payer: COMMERCIAL

## 2020-11-24 VITALS
TEMPERATURE: 97.8 F | BODY MASS INDEX: 43.12 KG/M2 | RESPIRATION RATE: 17 BRPM | OXYGEN SATURATION: 96 % | SYSTOLIC BLOOD PRESSURE: 129 MMHG | HEART RATE: 62 BPM | WEIGHT: 308 LBS | HEIGHT: 71 IN | DIASTOLIC BLOOD PRESSURE: 76 MMHG

## 2020-11-24 VITALS — OXYGEN SATURATION: 99 % | SYSTOLIC BLOOD PRESSURE: 107 MMHG | DIASTOLIC BLOOD PRESSURE: 81 MMHG

## 2020-11-24 PROCEDURE — 7100000001 HC PACU RECOVERY - ADDTL 15 MIN: Performed by: UROLOGY

## 2020-11-24 PROCEDURE — 3700000001 HC ADD 15 MINUTES (ANESTHESIA): Performed by: UROLOGY

## 2020-11-24 PROCEDURE — 6360000002 HC RX W HCPCS: Performed by: UROLOGY

## 2020-11-24 PROCEDURE — 2709999900 HC NON-CHARGEABLE SUPPLY: Performed by: UROLOGY

## 2020-11-24 PROCEDURE — 2500000003 HC RX 250 WO HCPCS: Performed by: NURSE ANESTHETIST, CERTIFIED REGISTERED

## 2020-11-24 PROCEDURE — 3600000013 HC SURGERY LEVEL 3 ADDTL 15MIN: Performed by: UROLOGY

## 2020-11-24 PROCEDURE — 3600000003 HC SURGERY LEVEL 3 BASE: Performed by: UROLOGY

## 2020-11-24 PROCEDURE — 2580000003 HC RX 258: Performed by: UROLOGY

## 2020-11-24 PROCEDURE — 3700000000 HC ANESTHESIA ATTENDED CARE: Performed by: UROLOGY

## 2020-11-24 PROCEDURE — 6370000000 HC RX 637 (ALT 250 FOR IP): Performed by: UROLOGY

## 2020-11-24 PROCEDURE — 6360000002 HC RX W HCPCS: Performed by: NURSE ANESTHETIST, CERTIFIED REGISTERED

## 2020-11-24 PROCEDURE — 7100000011 HC PHASE II RECOVERY - ADDTL 15 MIN: Performed by: UROLOGY

## 2020-11-24 PROCEDURE — 7100000000 HC PACU RECOVERY - FIRST 15 MIN: Performed by: UROLOGY

## 2020-11-24 PROCEDURE — 7100000010 HC PHASE II RECOVERY - FIRST 15 MIN: Performed by: UROLOGY

## 2020-11-24 RX ORDER — FENTANYL CITRATE 50 UG/ML
25 INJECTION, SOLUTION INTRAMUSCULAR; INTRAVENOUS EVERY 5 MIN PRN
Status: DISCONTINUED | OUTPATIENT
Start: 2020-11-24 | End: 2020-11-24 | Stop reason: HOSPADM

## 2020-11-24 RX ORDER — FENTANYL CITRATE 50 UG/ML
50 INJECTION, SOLUTION INTRAMUSCULAR; INTRAVENOUS EVERY 5 MIN PRN
Status: DISCONTINUED | OUTPATIENT
Start: 2020-11-24 | End: 2020-11-24 | Stop reason: HOSPADM

## 2020-11-24 RX ORDER — SODIUM CHLORIDE 0.9 % (FLUSH) 0.9 %
10 SYRINGE (ML) INJECTION PRN
Status: DISCONTINUED | OUTPATIENT
Start: 2020-11-24 | End: 2020-11-24 | Stop reason: HOSPADM

## 2020-11-24 RX ORDER — SODIUM CHLORIDE, SODIUM LACTATE, POTASSIUM CHLORIDE, CALCIUM CHLORIDE 600; 310; 30; 20 MG/100ML; MG/100ML; MG/100ML; MG/100ML
INJECTION, SOLUTION INTRAVENOUS CONTINUOUS
Status: DISCONTINUED | OUTPATIENT
Start: 2020-11-24 | End: 2020-11-24 | Stop reason: HOSPADM

## 2020-11-24 RX ORDER — DEXAMETHASONE SODIUM PHOSPHATE 4 MG/ML
INJECTION, SOLUTION INTRA-ARTICULAR; INTRALESIONAL; INTRAMUSCULAR; INTRAVENOUS; SOFT TISSUE PRN
Status: DISCONTINUED | OUTPATIENT
Start: 2020-11-24 | End: 2020-11-24 | Stop reason: SDUPTHER

## 2020-11-24 RX ORDER — SODIUM CHLORIDE 0.9 % (FLUSH) 0.9 %
10 SYRINGE (ML) INJECTION EVERY 12 HOURS SCHEDULED
Status: CANCELLED | OUTPATIENT
Start: 2020-11-24

## 2020-11-24 RX ORDER — CIPROFLOXACIN 2 MG/ML
400 INJECTION, SOLUTION INTRAVENOUS
Status: COMPLETED | OUTPATIENT
Start: 2020-11-24 | End: 2020-11-24

## 2020-11-24 RX ORDER — EPHEDRINE SULFATE/0.9% NACL/PF 50 MG/5 ML
SYRINGE (ML) INTRAVENOUS PRN
Status: DISCONTINUED | OUTPATIENT
Start: 2020-11-24 | End: 2020-11-24 | Stop reason: SDUPTHER

## 2020-11-24 RX ORDER — PROPOFOL 10 MG/ML
INJECTION, EMULSION INTRAVENOUS PRN
Status: DISCONTINUED | OUTPATIENT
Start: 2020-11-24 | End: 2020-11-24 | Stop reason: SDUPTHER

## 2020-11-24 RX ORDER — SODIUM CHLORIDE 0.9 % (FLUSH) 0.9 %
10 SYRINGE (ML) INJECTION PRN
Status: CANCELLED | OUTPATIENT
Start: 2020-11-24

## 2020-11-24 RX ORDER — HYDROCODONE BITARTRATE AND ACETAMINOPHEN 5; 325 MG/1; MG/1
1 TABLET ORAL
Status: DISCONTINUED | OUTPATIENT
Start: 2020-11-24 | End: 2020-11-24 | Stop reason: HOSPADM

## 2020-11-24 RX ORDER — DIMENHYDRINATE 50 MG/1
50 TABLET ORAL ONCE
Status: COMPLETED | OUTPATIENT
Start: 2020-11-24 | End: 2020-11-24

## 2020-11-24 RX ORDER — LIDOCAINE HYDROCHLORIDE 20 MG/ML
INJECTION, SOLUTION EPIDURAL; INFILTRATION; INTRACAUDAL; PERINEURAL PRN
Status: DISCONTINUED | OUTPATIENT
Start: 2020-11-24 | End: 2020-11-24 | Stop reason: SDUPTHER

## 2020-11-24 RX ORDER — ACETAMINOPHEN 325 MG/1
650 TABLET ORAL ONCE
Status: COMPLETED | OUTPATIENT
Start: 2020-11-24 | End: 2020-11-24

## 2020-11-24 RX ORDER — GLYCOPYRROLATE 1 MG/5 ML
SYRINGE (ML) INTRAVENOUS PRN
Status: DISCONTINUED | OUTPATIENT
Start: 2020-11-24 | End: 2020-11-24 | Stop reason: SDUPTHER

## 2020-11-24 RX ORDER — FENTANYL CITRATE 50 UG/ML
INJECTION, SOLUTION INTRAMUSCULAR; INTRAVENOUS PRN
Status: DISCONTINUED | OUTPATIENT
Start: 2020-11-24 | End: 2020-11-24 | Stop reason: SDUPTHER

## 2020-11-24 RX ORDER — KETOROLAC TROMETHAMINE 30 MG/ML
INJECTION, SOLUTION INTRAMUSCULAR; INTRAVENOUS PRN
Status: DISCONTINUED | OUTPATIENT
Start: 2020-11-24 | End: 2020-11-24 | Stop reason: SDUPTHER

## 2020-11-24 RX ORDER — ONDANSETRON 2 MG/ML
INJECTION INTRAMUSCULAR; INTRAVENOUS PRN
Status: DISCONTINUED | OUTPATIENT
Start: 2020-11-24 | End: 2020-11-24 | Stop reason: SDUPTHER

## 2020-11-24 RX ORDER — SODIUM CHLORIDE 0.9 % (FLUSH) 0.9 %
10 SYRINGE (ML) INJECTION EVERY 12 HOURS SCHEDULED
Status: DISCONTINUED | OUTPATIENT
Start: 2020-11-24 | End: 2020-11-24 | Stop reason: HOSPADM

## 2020-11-24 RX ADMIN — ACETAMINOPHEN 650 MG: 325 TABLET, FILM COATED ORAL at 07:51

## 2020-11-24 RX ADMIN — Medication 0.2 MG: at 09:13

## 2020-11-24 RX ADMIN — Medication 5 MG: at 09:16

## 2020-11-24 RX ADMIN — ONDANSETRON 4 MG: 2 INJECTION INTRAMUSCULAR; INTRAVENOUS at 08:55

## 2020-11-24 RX ADMIN — PROPOFOL 200 MG: 10 INJECTION, EMULSION INTRAVENOUS at 08:49

## 2020-11-24 RX ADMIN — DIMENHYDRINATE 50 MG: 50 TABLET ORAL at 07:51

## 2020-11-24 RX ADMIN — CIPROFLOXACIN 400 MG: 2 INJECTION, SOLUTION INTRAVENOUS at 08:43

## 2020-11-24 RX ADMIN — LIDOCAINE HYDROCHLORIDE 100 MG: 20 INJECTION, SOLUTION EPIDURAL; INFILTRATION; INTRACAUDAL; PERINEURAL at 08:49

## 2020-11-24 RX ADMIN — SODIUM CHLORIDE, POTASSIUM CHLORIDE, SODIUM LACTATE AND CALCIUM CHLORIDE: 600; 310; 30; 20 INJECTION, SOLUTION INTRAVENOUS at 07:54

## 2020-11-24 RX ADMIN — FENTANYL CITRATE 50 MCG: 50 INJECTION, SOLUTION INTRAMUSCULAR; INTRAVENOUS at 08:49

## 2020-11-24 RX ADMIN — KETOROLAC TROMETHAMINE 30 MG: 30 INJECTION, SOLUTION INTRAMUSCULAR; INTRAVENOUS at 09:31

## 2020-11-24 RX ADMIN — DEXAMETHASONE SODIUM PHOSPHATE 4 MG: 4 INJECTION, SOLUTION INTRAMUSCULAR; INTRAVENOUS at 08:55

## 2020-11-24 ASSESSMENT — PAIN - FUNCTIONAL ASSESSMENT: PAIN_FUNCTIONAL_ASSESSMENT: 0-10

## 2020-11-24 ASSESSMENT — PAIN SCALES - GENERAL
PAINLEVEL_OUTOF10: 0

## 2020-11-24 NOTE — ANESTHESIA PRE PROCEDURE
Department of Anesthesiology  Preprocedure Note       Name:  Peet Marin   Age:  63 y.o.  :  1955                                          MRN:  [id number]         Date:  2020      Surgeon: Cherelle Rogel):  Haydee Shabazz MD    Procedure: Procedure(s):  ESWL EXTRACORPOREAL SHOCK WAVE LITHOTRIPSY    Medications prior to admission:   Prior to Admission medications    Medication Sig Start Date End Date Taking?  Authorizing Provider   NONFORMULARY mylan/levothyrox 0.175 mg daily   Yes Historical Provider, MD   lovastatin (MEVACOR) 40 MG tablet Take 40 mg by mouth daily   Yes Historical Provider, MD   hydrALAZINE (APRESOLINE) 50 MG tablet Take 50 mg by mouth 3 times daily   Yes Historical Provider, MD   metoprolol tartrate (LOPRESSOR) 25 MG tablet Take 25 mg by mouth daily   Yes Historical Provider, MD   valsartan-hydrochlorothiazide (DIOVAN-HCT) 320-25 MG per tablet Take 1 tablet by mouth daily 160/12.5 - daily   Yes Historical Provider, MD   aspirin 81 MG EC tablet Take 81 mg by mouth daily    Historical Provider, MD   Testosterone 1.62 % GEL Place 2 Pump onto the skin daily    Historical Provider, MD   polyethylene glycol (GLYCOLAX) packet Take 17 g by mouth nightly    Historical Provider, MD   Ascorbic Acid (VITAMIN C) 500 MG CAPS Take by mouth daily    Historical Provider, MD   Omega-3 Fatty Acids (FISH OIL PO) Take 1 capsule by mouth 3 times daily Indications: LAST DOSE 2020     Historical Provider, MD       Current medications:    Current Facility-Administered Medications   Medication Dose Route Frequency Provider Last Rate Last Dose    lactated ringers infusion   Intravenous Continuous Haydee Shabazz  mL/hr at 20 0754      lactated ringers infusion   Intravenous Continuous Haydee Shabazz MD        sodium chloride flush 0.9 % injection 10 mL  10 mL Intravenous 2 times per day Haydee Shabazz MD        sodium chloride flush 0.9 % injection 10 mL  10 mL Intravenous PRN Siomara Hastings Randall Alves MD        ciprofloxacin (CIPRO) IVPB 400 mg  400 mg Intravenous On Call to Tomas Perez MD           Allergies:  No Known Allergies    Problem List:    Patient Active Problem List   Diagnosis Code    Ureteral calculus N20.1    Retinal detachment of left eye with multiple breaks H33.022    Renal calculus N20.0       Past Medical History:        Diagnosis Date    Acromegalia ([address]     Arthritis     H/O echocardiogram 09/13/2017    poor image quality. EF 60%. Moderate LV hypertrophy normal LV cavity size. Unable to assess specific wall motion abnormalities due to image quality. No significant valvular abnormalities. Mild diastolic dysfunction is seen.  History of stress test     25+ years ago. per patient it was normal    History of stress test 09/14/2017    Equivocal study. Moderate perfusion defect of moderate intensity in the inferolateral and inferior regions during stress and rest imaging. EF  71% without regional wall motion abnormalities. No significant electrocardiographic evidence of MI during EKG Vu Treadmill score 6,low risk for CAD.     Hyperlipidemia     Hypertension     Left retinal detachment     Sleep apnea     C-PAP    Thyroid disease     HYPOTHYROID    Ureteral stone     history       Past Surgical History:        Procedure Laterality Date    CATARACT REMOVAL Right     CATARACT REMOVAL WITH IMPLANT Left 07/13/2020    [facility]/    COLONOSCOPY  2014    CYSTOSCOPY Right 03/10/2017    HIP ARTHROPLASTY Right     KOLE    INTRACAPSULAR CATARACT EXTRACTION Left 7/13/2020    EYE CATARACT EMULSIFICATION IOL IMPLANT performed by Mango Szymanski DO at [address] [place] [address]    SHOULDER ARTHROPLASTY Bilateral     TOTAL KNEE ARTHROPLASTY Bilateral     VASCULAR SURGERY      VITRECTOMY Left 03/04/2020    VITRECTOMY Left 3/4/2020    VITRECTOMY 25 GAUGE, AIR FLUID EXCHANGE, ENDOLASER 200MW 200MS 607  SPOTS , AIR GAS EXCHANGE WITH 20 % SF6 performed by West Matt MD at [address] History:    Social History     Tobacco Use    Smoking status: Never Smoker    Smokeless tobacco: Never Used   Substance Use Topics    Alcohol use: Yes     Comment: occ                                Counseling given: Not Answered      Vital Signs (Current):   Vitals:    11/24/20 0728   BP: (!) 135/104   Pulse: 77   Resp: 18   Temp: 36.1 °C (96.9 °F)   TempSrc: Temporal   SpO2: 96%   Weight: (!) 308 lb (139.7 kg)   Height: 5' 11\" (1.803 m)                                              BP Readings from Last 3 Encounters:   11/24/20 (!) 135/104   11/17/20 (!) 124/93   11/09/20 (!) 155/98       NPO Status: Time of last liquid consumption: 2100                        Time of last solid consumption: 2100                        Date of last liquid consumption: 11/23/20                        Date of last solid food consumption: 11/23/20    BMI:   Wt Readings from Last 3 Encounters:   11/24/20 (!) 308 lb (139.7 kg)   11/17/20 (!) 308 lb 11.2 oz (140 kg)   11/09/20 (!) 311 lb (141.1 kg)     Body mass index is 42.96 kg/m². CBC:   Lab Results   Component Value Date    WBC 9.6 07/25/2017    RBC 4.68 07/25/2017    HGB 13.6 11/11/2017    HCT 40.7 11/11/2017    MCV 81.8 07/25/2017    RDW 13.5 07/25/2017     07/25/2017       CMP:   Lab Results   Component Value Date     07/25/2017    K 4.0 07/25/2017    CL 94 07/25/2017    CO2 27 07/25/2017    BUN 18 11/11/2017    CREATININE 1.20 03/04/2020    CREATININE 0.93 11/11/2017    GFRAA >60 11/11/2017    LABGLOM >60 03/04/2020    GLUCOSE 115 07/25/2017    PROT 7.5 07/25/2017    CALCIUM 10.1 07/25/2017    BILITOT 1.10 07/25/2017    ALKPHOS 81 07/25/2017    AST 27 07/25/2017    ALT 41 07/25/2017       POC Tests: No results for input(s): POCGLU, POCNA, POCK, POCCL, POCBUN, POCHEMO, POCHCT in the last 72 hours.     Coags:   Lab Results   Component Value Date    PROTIME 10.0 07/25/2017    INR 1.0 07/25/2017    APTT 29.4 07/25/2017       HCG (If Applicable): No results found for: PREGTESTUR, PREGSERUM, HCG, HCGQUANT     ABGs: No results found for: PHART, PO2ART, TLJ7JWN, UGO4SBL, BEART, Q3YILPGQ     Type & Screen (If Applicable):  No results found for: LABABO, LABRH    Drug/Infectious Status (If Applicable):  No results found for: HIV, HEPCAB    COVID-19 Screening (If Applicable):   Lab Results   Component Value Date    COVID19 Not Detected 11/17/2020    COVID19 Not Detected 07/09/2020         Anesthesia Evaluation   no history of anesthetic complications:   Airway: Mallampati: IV  TM distance: >3 FB   Neck ROM: full  Mouth opening: > = 3 FB Dental: normal exam         Pulmonary:normal exam  breath sounds clear to auscultation  (+) sleep apnea: on CPAP,                             Cardiovascular:  Exercise tolerance: good (>4 METS),   (+) hypertension:, hyperlipidemia      ECG reviewed                        Neuro/Psych:               GI/Hepatic/Renal:   (+) morbid obesity          Endo/Other: Negative Endo/Other ROS                    Abdominal:   (+) obese,         Vascular:                                        Anesthesia Plan      general and TIVA     ASA 3       Induction: intravenous. Anesthetic plan and risks discussed with patient.                       DARLEEN Ewing - CRNA   11/24/2020

## 2020-11-24 NOTE — OP NOTE
[address]                                OPERATIVE REPORT    PATIENT NAME: Donna Mcdermott                    :        1955  MED REC NO:   [id number]                              ROOM:  ACCOUNT NO:   [de-identified]                           ADMIT DATE: 2020  PROVIDER:     Tiesha Garcia    DATE OF PROCEDURE:  2020    SURGEON:  Dr. Tiesha Garcia. ASSISTANT:  None. PREOPERATIVE DIAGNOSIS:  Right renal calculus. POSTOPERATIVE DIAGNOSIS:  Right renal calculus. PROCEDURE PERFORMED:  Right-sided ESWL. ANESTHESIA:  General.    COMPLICATIONS:  None. ESTIMATED BLOOD LOSS:  Minimal.    SPECIMENS:  None. PROSTHESIS:  None. DISPOSITION:  Stable. FINDINGS:  A 7-mm right renal calculus. INDICATIONS:  This patient is a 70-year-old male with known right renal  calculus, here now for definitive therapy. DESCRIPTION OF PROCEDURE:  The patient was taken back to the operating  room after informed consent including all risks, benefits, and  alternatives were obtained. The patient was transferred from the [facility]  onto the operating table, where he was induced under general anesthesia. Placed on lithotripsy table under biplanar fluoroscopy. To begin the  case, the stone was identified by plantar fluoroscopy. We were able to  perform lithotripsy. We used 3000 shocks, power level ranging between 3  and 8 and frequency between 60 and 90. At 3000 shocks, the treatment  head was removed and we saw fluoroscopic ablation of the stone. He was  then awoken from general anesthesia, transferred to the [facility], and  taken to the PACU in satisfactory condition by Nursing and Anesthesia  Teams. PLAN:  The patient will be discharged home per PACU criterion and  followup with us in three months with a repeat KUB.         Meg Saucedo    D: 2020 9:49:50       T: 2020 12:55:38 [id number]  Job#: [id number]     Doc#: [id number]    CC:

## 2020-11-24 NOTE — PROGRESS NOTES

## 2020-11-24 NOTE — TELEPHONE ENCOUNTER
Patient had ESWL today and appointment scheduled for 3 month follow up with KUB prior.     Need KUB order

## 2020-11-24 NOTE — PROGRESS NOTES
Discharge instrucitons given to patient and patient wife; verbalizes understanding and offers no questions at this time.

## 2020-11-24 NOTE — BRIEF OP NOTE
Brief Postoperative Note      Patient: Reese Brantley  YOB: 1955  MRN: [id number]    Date of Procedure: 11/24/2020    Pre-Op Diagnosis: RENAL STONES    Post-Op Diagnosis: Same       Procedure(s):  ESWL EXTRACORPOREAL SHOCK WAVE LITHOTRIPSY    Surgeon(s):  Ron Workman MD    Assistant:  * No surgical staff found *    Anesthesia: General    Estimated Blood Loss (mL): Minimal    Complications: None    Specimens:   * No specimens in log *    Implants:  * No implants in log *      Drains: * No LDAs found *    Findings: 7mm right renal calculus    Electronically signed by Ron Workman MD on 11/24/2020 at 9:46 AM

## 2021-02-18 ENCOUNTER — TELEPHONE (OUTPATIENT)
Dept: UROLOGY | Age: 66
End: 2021-02-18

## 2021-02-18 NOTE — TELEPHONE ENCOUNTER
Reminded patient he has xray that needs completed prior to urology apt 02/25/21. Patient states he is planning to complete this on 02/22/21.

## 2021-02-22 ENCOUNTER — HOSPITAL ENCOUNTER (OUTPATIENT)
Dept: GENERAL RADIOLOGY | Age: 66
Discharge: HOME | End: 2021-02-24
Payer: COMMERCIAL

## 2021-02-22 ENCOUNTER — HOSPITAL ENCOUNTER (OUTPATIENT)
Age: 66
Discharge: HOME | End: 2021-02-24
Payer: COMMERCIAL

## 2021-02-22 DIAGNOSIS — N20.0 RENAL STONE: ICD-10-CM

## 2021-02-22 PROCEDURE — 74018 RADEX ABDOMEN 1 VIEW: CPT

## 2021-02-25 ENCOUNTER — HOSPITAL ENCOUNTER (OUTPATIENT)
Age: 66
Discharge: HOME | End: 2021-02-25
Payer: COMMERCIAL

## 2021-02-25 ENCOUNTER — OFFICE VISIT (OUTPATIENT)
Dept: UROLOGY | Age: 66
End: 2021-02-25
Payer: COMMERCIAL

## 2021-02-25 VITALS
TEMPERATURE: 97.9 F | DIASTOLIC BLOOD PRESSURE: 84 MMHG | BODY MASS INDEX: 42.96 KG/M2 | WEIGHT: 308 LBS | SYSTOLIC BLOOD PRESSURE: 136 MMHG

## 2021-02-25 DIAGNOSIS — Z12.5 SCREENING PSA (PROSTATE SPECIFIC ANTIGEN): ICD-10-CM

## 2021-02-25 DIAGNOSIS — N20.0 RENAL STONES: Primary | ICD-10-CM

## 2021-02-25 LAB — PROSTATE SPECIFIC ANTIGEN: 0.18 UG/L

## 2021-02-25 PROCEDURE — 36415 COLL VENOUS BLD VENIPUNCTURE: CPT

## 2021-02-25 PROCEDURE — G0103 PSA SCREENING: HCPCS

## 2021-02-25 PROCEDURE — 99213 OFFICE O/P EST LOW 20 MIN: CPT | Performed by: NURSE PRACTITIONER

## 2021-02-25 ASSESSMENT — ENCOUNTER SYMPTOMS
COUGH: 0
VOMITING: 0
NAUSEA: 0
CONSTIPATION: 0
COLOR CHANGE: 0
EYE REDNESS: 0
BACK PAIN: 0
SHORTNESS OF BREATH: 0
WHEEZING: 0
ABDOMINAL PAIN: 0

## 2021-02-25 NOTE — PATIENT INSTRUCTIONS
To prevent future stone formation:  1) drink around 80 ounces of water per day  2) Avoid/minimize intake of \"bad fluids\" (soda pop, coffee, tea, alcohol, energy drinks)  3) reduce consumption of sodium/salt  4) reduce consumption of fatty animal protein (full-fat cheese/milk/dairy, red meats, pork). Limit protein intake to low-fat/lean options (low-fat dairy, fish, chicken, turkey). Avoid the keto or clark diets. Schedule a Vaccine  When you qualify to receive the vaccine per the [address] guidelines, call the [facility] COVID-19 Vaccination Hotline to schedule your appointment or to get additional information about the [facility] locations which are offering the COVID-19 vaccine. To be most effective, it's important that you receive two doses of one of the COVID-19 vaccines. -If you are receiving the Rao Peter vaccine, your second shot will be scheduled as close to 21 days after the first shot as possible. -If you are receiving the Moderna vaccine, your second shot will be scheduled as close to 28 days after the first shot as possible. [address] Vaccination Hotline: [phone]    In partnership with [facility] and [facility] HEALTH Departments, patients can call [phone], Monday-Friday 8:00am-4:00pm for scheduling at our Hospitals. Or visit the [facility] websites for additional information of vaccine administration locations. Links to [facility] website and Health Department websites:    [email or web address]. [employer]/[facility]-monitoring-coronavirus-covid-19/covid-19-vaccine/[place]/yanez-vaccine    [facility].tn    [email or web address]    SURVEY:    You may be receiving a survey from [employer] regarding your visit today. Please complete the survey to enable us to provide the highest quality of care to you and your family. If you cannot score us a very good on any question, please call the office to discuss how we could have made your experience a very good one. Thank you.       Your MA today: Kelsi Antunez

## 2021-02-25 NOTE — PROGRESS NOTES
HPI:          Patient is a 63 y.o. male in no acute distress. He is alert and oriented to person, place, and time. History  Acromegalia - follows with endocrinology    3/2017 right HLL for 8mm ureteral stone    11/2020 Right ESWL for a 7mm right renal stone      Today  Here today to follow-up status post right ESWL. He denies any flank or abdominal pain, dysuria or gross hematuria. He did have a KUB completed prior to today's visit. This film was independently reviewed and shows a possible punctate right renal stone. Past Medical History:   Diagnosis Date    Acromegalia ([address]     Arthritis     H/O echocardiogram 09/13/2017    poor image quality. EF 60%. Moderate LV hypertrophy normal LV cavity size. Unable to assess specific wall motion abnormalities due to image quality. No significant valvular abnormalities. Mild diastolic dysfunction is seen.  History of stress test     25+ years ago. per patient it was normal    History of stress test 09/14/2017    Equivocal study. Moderate perfusion defect of moderate intensity in the inferolateral and inferior regions during stress and rest imaging. EF  71% without regional wall motion abnormalities. No significant electrocardiographic evidence of MI during EKG Vu Treadmill score 6,low risk for CAD.     Hyperlipidemia     Hypertension     Left retinal detachment     Sleep apnea     C-PAP    Thyroid disease     HYPOTHYROID    Ureteral stone     history     Past Surgical History:   Procedure Laterality Date    CATARACT REMOVAL Right     CATARACT REMOVAL WITH IMPLANT Left 07/13/2020    MHT/    COLONOSCOPY  2014    CYSTOSCOPY Right 03/10/2017    HIP ARTHROPLASTY Right     KOLE    INTRACAPSULAR CATARACT EXTRACTION Left 7/13/2020    EYE CATARACT EMULSIFICATION IOL IMPLANT performed by Cynthia Urbina DO at [facility] LITHOTRIPSY Right 11/24/2020    ESWL EXTRACORPOREAL SHOCK WAVE LITHOTRIPSY (Right )  LITHOTRIPSY Right 11/24/2020    ESWL EXTRACORPOREAL SHOCK WAVE LITHOTRIPSY performed by Vincenzo Younger MD at [facility] TUMOR REMOVAL    SHOULDER ARTHROPLASTY Bilateral     TOTAL KNEE ARTHROPLASTY Bilateral     VASCULAR SURGERY      VITRECTOMY Left 03/04/2020    VITRECTOMY Left 3/4/2020    VITRECTOMY 25 GAUGE, AIR FLUID EXCHANGE, ENDOLASER 200MW 200MS 607  SPOTS , AIR GAS EXCHANGE WITH 20 % SF6 performed by Abdiel Krause MD at [facility] Encounter Medications as of 2/25/2021   Medication Sig Dispense Refill    aspirin 81 MG EC tablet Take 81 mg by mouth daily      Testosterone 1.62 % GEL Place 2 Pump onto the skin daily      polyethylene glycol (GLYCOLAX) packet Take 17 g by mouth nightly      NONFORMULARY mylan/levothyrox 0.175 mg daily      lovastatin (MEVACOR) 40 MG tablet Take 40 mg by mouth daily      hydrALAZINE (APRESOLINE) 50 MG tablet Take 50 mg by mouth 3 times daily      metoprolol tartrate (LOPRESSOR) 25 MG tablet Take 25 mg by mouth daily      valsartan-hydrochlorothiazide (DIOVAN-HCT) 320-25 MG per tablet Take 1 tablet by mouth daily 160/12.5 - daily      Ascorbic Acid (VITAMIN C) 500 MG CAPS Take by mouth daily      Omega-3 Fatty Acids (FISH OIL PO) Take 1 capsule by mouth 3 times daily Indications: LAST DOSE 2/27/2020        No facility-administered encounter medications on file as of 2/25/2021.        Current Outpatient Medications on File Prior to Visit   Medication Sig Dispense Refill    aspirin 81 MG EC tablet Take 81 mg by mouth daily      Testosterone 1.62 % GEL Place 2 Pump onto the skin daily      polyethylene glycol (GLYCOLAX) packet Take 17 g by mouth nightly      NONFORMULARY mylan/levothyrox 0.175 mg daily      lovastatin (MEVACOR) 40 MG tablet Take 40 mg by mouth daily      hydrALAZINE (APRESOLINE) 50 MG tablet Take 50 mg by mouth 3 times daily  metoprolol tartrate (LOPRESSOR) 25 MG tablet Take 25 mg by mouth daily      valsartan-hydrochlorothiazide (DIOVAN-HCT) 320-25 MG per tablet Take 1 tablet by mouth daily 160/12.5 - daily      Ascorbic Acid (VITAMIN C) 500 MG CAPS Take by mouth daily      Omega-3 Fatty Acids (FISH OIL PO) Take 1 capsule by mouth 3 times daily Indications: LAST DOSE 2/27/2020        No current facility-administered medications on file prior to visit. Patient has no known allergies. Family History   Problem Relation Age of Onset    Diabetes Mother     Alzheimer's Disease Mother      Social History     Tobacco Use   Smoking Status Never Smoker   Smokeless Tobacco Never Used       Social History     Substance and Sexual Activity   Alcohol Use Yes    Comment: occ       Review of Systems   Constitutional: Negative for appetite change, chills and fever. Eyes: Negative for redness and visual disturbance. Respiratory: Negative for cough, shortness of breath and wheezing. Cardiovascular: Negative for chest pain and leg swelling. Gastrointestinal: Negative for abdominal pain, constipation, nausea and vomiting. Genitourinary: Negative for decreased urine volume, difficulty urinating, discharge, dysuria, enuresis, flank pain, frequency, hematuria, penile pain, scrotal swelling, testicular pain and urgency. Musculoskeletal: Negative for back pain, joint swelling and myalgias. Skin: Negative for color change, rash and wound. Neurological: Negative for dizziness, tremors and numbness. Hematological: Negative for adenopathy. Does not bruise/bleed easily. /84 (Site: Right Upper Arm, Position: Sitting, Cuff Size: Large Adult) Comment: manual  Temp 97.9 °F (36.6 °C) (Temporal)   Wt (!) 308 lb (139.7 kg)   BMI 42.96 kg/m²       PHYSICAL EXAM:  Constitutional: Patient in no acute distress; Neuro: alert and oriented to person place and time.     Psych: Mood and affect normal.  Skin: Normal Lungs: Respiratory effort normal  Cardiovascular:  Normal peripheral pulses  Abdomen: Soft, non-tender, non-distended with no CVA, flank pain      Lab Results   Component Value Date    BUN 18 11/11/2017     Lab Results   Component Value Date    CREATININE 1.20 (H) 03/04/2020     Lab Results   Component Value Date    PSA 0.59 05/23/2019    PSA 0.11 05/01/2018       ASSESSMENT:   Diagnosis Orders   1. Renal stones  XR ABDOMEN (KUB) (SINGLE AP VIEW)   2. Screening PSA (prostate specific antigen)  PSA Screening         PLAN:  Reminded of dietary stone prevention:  1) drink around 80 ounces of water per day  2) Avoid/minimize intake of \"bad fluids\" (soda pop, coffee, tea, alcohol, energy drinks)  3) reduce consumption of sodium/salt  4) reduce consumption of fatty animal protein (full-fat cheese/milk/dairy, red meats, pork). Limit protein intake to low-fat/lean options (low-fat dairy, fish, chicken, turkey)    He is due for a PSA screening. He will obtain this today and we will call him with these results. He will follow up in 1 year with a KUB and PSA prior or sooner if needed.

## 2021-08-18 ENCOUNTER — HOSPITAL ENCOUNTER (EMERGENCY)
Age: 66
Discharge: HOME | End: 2021-08-18
Attending: EMERGENCY MEDICINE
Payer: COMMERCIAL

## 2021-08-18 ENCOUNTER — APPOINTMENT (OUTPATIENT)
Dept: CT IMAGING | Age: 66
End: 2021-08-18
Payer: COMMERCIAL

## 2021-08-18 VITALS
RESPIRATION RATE: 16 BRPM | HEART RATE: 72 BPM | DIASTOLIC BLOOD PRESSURE: 109 MMHG | OXYGEN SATURATION: 98 % | TEMPERATURE: 98.6 F | SYSTOLIC BLOOD PRESSURE: 160 MMHG | BODY MASS INDEX: 42.54 KG/M2 | WEIGHT: 305 LBS

## 2021-08-18 DIAGNOSIS — R10.9 FLANK PAIN: Primary | ICD-10-CM

## 2021-08-18 DIAGNOSIS — K62.5 RECTAL BLEEDING: ICD-10-CM

## 2021-08-18 LAB
-: ABNORMAL
ABSOLUTE EOS #: 0.14 K/UL (ref 0–0.44)
ABSOLUTE IMMATURE GRANULOCYTE: <0.03 K/UL (ref 0–0.3)
ABSOLUTE LYMPH #: 1.51 K/UL (ref 1.1–3.7)
ABSOLUTE MONO #: 0.6 K/UL (ref 0.1–1.2)
ALBUMIN SERPL-MCNC: 4.2 G/DL (ref 3.5–5.2)
ALBUMIN/GLOBULIN RATIO: 1.5 (ref 1–2.5)
ALP BLD-CCNC: 72 U/L (ref 40–129)
ALT SERPL-CCNC: 39 U/L (ref 5–41)
AMORPHOUS: ABNORMAL
ANION GAP SERPL CALCULATED.3IONS-SCNC: 12 MMOL/L (ref 9–17)
AST SERPL-CCNC: 31 U/L
BACTERIA: ABNORMAL
BASOPHILS # BLD: 0 % (ref 0–2)
BASOPHILS ABSOLUTE: <0.03 K/UL (ref 0–0.2)
BILIRUB SERPL-MCNC: 0.5 MG/DL (ref 0.3–1.2)
BILIRUBIN URINE: NEGATIVE
BUN BLDV-MCNC: 16 MG/DL (ref 8–23)
BUN/CREAT BLD: 18 (ref 9–20)
CALCIUM SERPL-MCNC: 9.5 MG/DL (ref 8.6–10.4)
CASTS UA: ABNORMAL /LPF
CHLORIDE BLD-SCNC: 103 MMOL/L (ref 98–107)
CO2: 27 MMOL/L (ref 20–31)
COLOR: YELLOW
COMMENT UA: ABNORMAL
CREAT SERPL-MCNC: 0.91 MG/DL (ref 0.7–1.2)
CRYSTALS, UA: ABNORMAL /HPF
DIFFERENTIAL TYPE: NORMAL
EOSINOPHILS RELATIVE PERCENT: 2 % (ref 1–4)
EPITHELIAL CELLS UA: ABNORMAL /HPF (ref 0–5)
GFR AFRICAN AMERICAN: >60 ML/MIN
GFR NON-AFRICAN AMERICAN: >60 ML/MIN
GFR SERPL CREATININE-BSD FRML MDRD: ABNORMAL ML/MIN/{1.73_M2}
GFR SERPL CREATININE-BSD FRML MDRD: ABNORMAL ML/MIN/{1.73_M2}
GLUCOSE BLD-MCNC: 114 MG/DL (ref 70–99)
GLUCOSE URINE: NEGATIVE
HCT VFR BLD CALC: 42 % (ref 40.7–50.3)
HEMOGLOBIN: 14 G/DL (ref 13–17)
IMMATURE GRANULOCYTES: 0 %
INR BLD: 1
KETONES, URINE: NEGATIVE
LEUKOCYTE ESTERASE, URINE: NEGATIVE
LYMPHOCYTES # BLD: 26 % (ref 24–43)
MAGNESIUM: 1.9 MG/DL (ref 1.6–2.6)
MCH RBC QN AUTO: 28.4 PG (ref 25.2–33.5)
MCHC RBC AUTO-ENTMCNC: 33.3 G/DL (ref 28.4–34.8)
MCV RBC AUTO: 85.2 FL (ref 82.6–102.9)
MONOCYTES # BLD: 11 % (ref 3–12)
MUCUS: ABNORMAL
NITRITE, URINE: NEGATIVE
NRBC AUTOMATED: 0 PER 100 WBC
OTHER OBSERVATIONS UA: ABNORMAL
PARTIAL THROMBOPLASTIN TIME: 27.6 SEC (ref 23.9–33.8)
PDW BLD-RTO: 12.9 % (ref 11.8–14.4)
PH UA: 6.5 (ref 5–9)
PLATELET # BLD: 147 K/UL (ref 138–453)
PLATELET ESTIMATE: NORMAL
PMV BLD AUTO: 8.9 FL (ref 8.1–13.5)
POTASSIUM SERPL-SCNC: 3.4 MMOL/L (ref 3.7–5.3)
PROTEIN UA: NEGATIVE
PROTHROMBIN TIME: 13.5 SEC (ref 11.5–14.2)
RBC # BLD: 4.93 M/UL (ref 4.21–5.77)
RBC # BLD: NORMAL 10*6/UL
RBC UA: ABNORMAL /HPF (ref 0–2)
RENAL EPITHELIAL, UA: ABNORMAL /HPF
SEG NEUTROPHILS: 61 % (ref 36–65)
SEGMENTED NEUTROPHILS ABSOLUTE COUNT: 3.45 K/UL (ref 1.5–8.1)
SODIUM BLD-SCNC: 142 MMOL/L (ref 135–144)
SPECIFIC GRAVITY UA: 1.02 (ref 1.01–1.02)
TOTAL PROTEIN: 7 G/DL (ref 6.4–8.3)
TRICHOMONAS: ABNORMAL
TURBIDITY: CLEAR
URINE HGB: NEGATIVE
UROBILINOGEN, URINE: ABNORMAL
WBC # BLD: 5.7 K/UL (ref 3.5–11.3)
WBC # BLD: NORMAL 10*3/UL
WBC UA: ABNORMAL /HPF (ref 0–5)
YEAST: ABNORMAL

## 2021-08-18 PROCEDURE — 36415 COLL VENOUS BLD VENIPUNCTURE: CPT

## 2021-08-18 PROCEDURE — 85730 THROMBOPLASTIN TIME PARTIAL: CPT

## 2021-08-18 PROCEDURE — 80053 COMPREHEN METABOLIC PANEL: CPT

## 2021-08-18 PROCEDURE — 2580000003 HC RX 258: Performed by: EMERGENCY MEDICINE

## 2021-08-18 PROCEDURE — 81001 URINALYSIS AUTO W/SCOPE: CPT

## 2021-08-18 PROCEDURE — 74176 CT ABD & PELVIS W/O CONTRAST: CPT

## 2021-08-18 PROCEDURE — 83735 ASSAY OF MAGNESIUM: CPT

## 2021-08-18 PROCEDURE — 85610 PROTHROMBIN TIME: CPT

## 2021-08-18 PROCEDURE — 85025 COMPLETE CBC W/AUTO DIFF WBC: CPT

## 2021-08-18 PROCEDURE — 99283 EMERGENCY DEPT VISIT LOW MDM: CPT

## 2021-08-18 RX ORDER — 0.9 % SODIUM CHLORIDE 0.9 %
1000 INTRAVENOUS SOLUTION INTRAVENOUS ONCE
Status: COMPLETED | OUTPATIENT
Start: 2021-08-18 | End: 2021-08-18

## 2021-08-18 RX ADMIN — SODIUM CHLORIDE 1000 ML: 9 INJECTION, SOLUTION INTRAVENOUS at 18:52

## 2021-08-18 ASSESSMENT — PAIN DESCRIPTION - DESCRIPTORS: DESCRIPTORS: ACHING

## 2021-08-18 ASSESSMENT — PAIN DESCRIPTION - PAIN TYPE: TYPE: ACUTE PAIN

## 2021-08-18 ASSESSMENT — PAIN DESCRIPTION - LOCATION: LOCATION: FLANK

## 2021-08-18 ASSESSMENT — PAIN DESCRIPTION - FREQUENCY: FREQUENCY: CONTINUOUS

## 2021-08-18 ASSESSMENT — PAIN DESCRIPTION - ORIENTATION: ORIENTATION: LEFT

## 2021-08-18 ASSESSMENT — PAIN SCALES - GENERAL: PAINLEVEL_OUTOF10: 3

## 2021-08-18 NOTE — ED PROVIDER NOTES
[facility] ED  EMERGENCY DEPARTMENT ENCOUNTER      Pt Name: Jean Herrera  MRN: [id number]  [place] 1955  Date of evaluation: 8/18/2021  Provider: Brooklyn Edwards MD    [address] [place] [address]       Chief Complaint   Patient presents with    Flank Pain     left sided, onset this afternoon    Rectal Bleeding     onset last night         HISTORY OF PRESENT ILLNESS   (Location/Symptom, Timing/Onset, Context/Setting, Quality, Duration, Modifying Factors, Severity)  Note limiting factors. Jean Herrera is a 63 y.o. male who presents to the emergency department      30-year-old male presented to emergency department for evaluation of intermittent left-sided flank pain that started today. Patient states he has episodes of aching and sharp left flank pain. Pain does not radiate. May last several seconds at a time. Not take anything at home for relief. Also states that he noticed with bowel movement he was having red discoloration today. Denies diarrhea or constipation. Does have a history of hemorrhoids. He has not experienced any rectal or hemorrhoid pain at this time. He has not had any fevers or chills. Denies any dysuria or urinary frequency. He is not taking anything at home for relief. Denies any flank pain at this time. Denies any other acute concerns. States he has had screening colonoscopies in the past and his wife thinks he is due sometime within the next year for repeat colonoscopy. No previous known abnormal findings per her report. Nursing Notes were reviewed. REVIEW OF SYSTEMS    (2-9 systems for level 4, 10 or more for level 5)     Review of Systems   All other systems reviewed and are negative. Except as noted above the remainder of the review of systems was reviewed and negative. PAST MEDICAL HISTORY     Past Medical History:   Diagnosis Date    Acromegalia ([facility] [address]     Arthritis     H/O echocardiogram 09/13/2017    poor image quality. EF 60%.   Moderate LV hypertrophy normal LV cavity size. Unable to assess specific wall motion abnormalities due to image quality. No significant valvular abnormalities. Mild diastolic dysfunction is seen.  History of stress test     25+ years ago. per patient it was normal    History of stress test 09/14/2017    Equivocal study. Moderate perfusion defect of moderate intensity in the inferolateral and inferior regions during stress and rest imaging. EF  71% without regional wall motion abnormalities. No significant electrocardiographic evidence of MI during EKG Vu Treadmill score 6,low risk for CAD.     Hyperlipidemia     Hypertension     Left retinal detachment     Sleep apnea     C-PAP    Thyroid disease     HYPOTHYROID    Ureteral stone     history         SURGICAL HISTORY       Past Surgical History:   Procedure Laterality Date    CATARACT REMOVAL Right     CATARACT REMOVAL WITH IMPLANT Left 07/13/2020    MHT/    COLONOSCOPY  2014    CYSTOSCOPY Right 03/10/2017    HIP ARTHROPLASTY Right     KOLE    INTRACAPSULAR CATARACT EXTRACTION Left 7/13/2020    EYE CATARACT EMULSIFICATION IOL IMPLANT performed by Clem Lr DO at [facility] LITHOTRIPSY Right 11/24/2020    ESWL EXTRACORPOREAL SHOCK WAVE LITHOTRIPSY (Right )     LITHOTRIPSY Right 11/24/2020    ESWL EXTRACORPOREAL SHOCK WAVE LITHOTRIPSY performed by Shalini Funez MD at [address] [place] [address]    SHOULDER ARTHROPLASTY Bilateral     TOTAL KNEE ARTHROPLASTY Bilateral     VASCULAR SURGERY      VITRECTOMY Left 03/04/2020    VITRECTOMY Left 3/4/2020    VITRECTOMY 25 GAUGE, AIR FLUID EXCHANGE, ENDOLASER 200MW 200MS 607  SPOTS , AIR GAS EXCHANGE WITH 20 % SF6 performed by Alfie Marrero MD at [facility]       Discharge Medication List as of 8/18/2021  9:09 PM      CONTINUE these medications which have NOT CHANGED    Details   aspirin 81 MG EC tablet Take 81 mg by mouth dailyHistorical Med      Testosterone 1.62 % GEL Place 2 Pump onto the skin dailyHistorical Med      polyethylene glycol (GLYCOLAX) packet Take 17 g by mouth nightlyHistorical Med      NONFORMULARY mylan/levothyrox 0.175 mg dailyHistorical Med      lovastatin (MEVACOR) 40 MG tablet Take 40 mg by mouth dailyHistorical Med      hydrALAZINE (APRESOLINE) 50 MG tablet Take 50 mg by mouth 3 times dailyHistorical Med      metoprolol tartrate (LOPRESSOR) 25 MG tablet Take 25 mg by mouth dailyHistorical Med      valsartan-hydrochlorothiazide (DIOVAN-HCT) 320-25 MG per tablet Take 1 tablet by mouth daily 160/12.5 - dailyHistorical Med      Ascorbic Acid (VITAMIN C) 500 MG CAPS Take by mouth dailyHistorical Med      Omega-3 Fatty Acids (FISH OIL PO) Take 1 capsule by mouth 3 times daily Indications: LAST DOSE 2/27/2020 Historical Med             ALLERGIES     Patient has no known allergies. FAMILY HISTORY       Family History   Problem Relation Age of Onset    Diabetes Mother     Alzheimer's Disease Mother           SOCIAL HISTORY       Social History     Socioeconomic History    Marital status:      Spouse name: None    Number of children: None    Years of education: None    Highest education level: None   Occupational History    None   Tobacco Use    Smoking status: Never Smoker    Smokeless tobacco: Never Used   Vaping Use    Vaping Use: Never used   Substance and Sexual Activity    Alcohol use: Yes     Comment: occ    Drug use: No    Sexual activity: None   Other Topics Concern    None   Social History Narrative    None     Social Determinants of Health     Financial Resource Strain:     Difficulty of Paying Living Expenses:    Food Insecurity:     Worried About Running Out of Food in the Last Year:     Ran Out of Food in the Last Year:    Transportation Needs:     Lack of Transportation (Medical):      Lack of Transportation (Non-Medical):    Physical Activity:     Days of Exercise per Week:  Minutes of Exercise per Session:    Stress:     Feeling of Stress :    Social Connections:     Frequency of Communication with Friends and Family:     Frequency of Social Gatherings with Friends and Family:     Attends Jain Services:     Active Member of Clubs or Organizations:     Attends Club or Organization Meetings:     Marital Status:    Intimate Partner Violence:     Fear of Current or Ex-Partner:     Emotionally Abused:     Physically Abused:     Sexually Abused:        SCREENINGS        [place] Coma Scale  Eye Opening: Spontaneous  Best Verbal Response: Oriented  Best Motor Response: Obeys commands  [place] Coma Scale Score: 15               PHYSICAL EXAM    (up to 7 for level 4, 8 or more for level 5)     ED Triage Vitals [08/18/21 1659]   BP Temp Temp Source Pulse Resp SpO2 Height Weight   (!) 176/109 98.6 °F (37 °C) Tympanic 75 16 98 % -- (!) 305 lb (138.3 kg)       Physical Exam  Vitals and nursing note reviewed. Constitutional:       Comments: Patient is resting comfortably. He is in no acute distress   HENT:      Head: Normocephalic and atraumatic. Cardiovascular:      Rate and Rhythm: Normal rate and regular rhythm. Pulmonary:      Effort: Pulmonary effort is normal. No respiratory distress. Breath sounds: Normal breath sounds. Abdominal:      General: There is no distension. Palpations: Abdomen is soft. Tenderness: There is no abdominal tenderness. Comments: No localized tenderness. Rectal exam patient did have small internal hemorrhoids. No thrombosis. No active bleeding. Stool is Hemoccult negative. Musculoskeletal:         General: Normal range of motion. Skin:     General: Skin is warm and dry. Findings: No rash. Neurological:      General: No focal deficit present. Mental Status: He is alert and oriented to person, place, and time. Psychiatric:         Mood and Affect: Mood normal.         DIAGNOSTIC RESULTS     EKG:  All EKG's are interpreted by the Emergency Department Physician who either signs or Co-signs this chart in the absence of a cardiologist.        RADIOLOGY:   Non-plain film images such as CT, Ultrasound and MRI are read by the radiologist. Plain radiographic images are visualized and preliminarily interpreted by the emergency physician with the below findings:        Interpretation per the Radiologist below, if available at the time of this note:    CT ABDOMEN PELVIS WO CONTRAST Additional Contrast? None   Final Result   3-5 small nonobstructing nephroliths in the right kidney. Suggestion of mild perivesicular fat stranding, though evaluation is degraded   by streak artifact from hip arthroplasty hardware. Correlate with urinary   labs to exclude cystitis. Hepatic steatosis with focal fatty sparing. Colonic diverticulosis without diverticulitis. Advanced degenerative changes throughout the spine which are progressed from   prior comparison and greatest at L3-L4 and L5-S1. Progressed grade 1   anterolisthesis at L5 on S1 in the setting of bilateral L5 pars   interarticularis defects. ED BEDSIDE ULTRASOUND:   Performed by ED Physician - none    LABS:  Labs Reviewed   COMPREHENSIVE METABOLIC PANEL W/ REFLEX TO MG FOR LOW K - Abnormal; Notable for the following components:       Result Value    Glucose 114 (*)     Potassium 3.4 (*)     All other components within normal limits   URINE RT REFLEX TO CULTURE - Abnormal; Notable for the following components:    Urobilinogen, Urine ELEVATED (*)     All other components within normal limits   MICROSCOPIC URINALYSIS - Abnormal; Notable for the following components:    Amorphous, UA 1+ (*)     All other components within normal limits   CBC WITH AUTO DIFFERENTIAL   APTT   PROTIME-INR   MAGNESIUM       All other labs were within normal range or not returned as of this dictation.     EMERGENCY DEPARTMENT COURSE and DIFFERENTIAL DIAGNOSIS/MDM: Vitals:    Vitals:    08/18/21 1659 08/18/21 2115   BP: (!) 176/109 (!) 160/109   Pulse: 75 72   Resp: 16    Temp: 98.6 °F (37 °C)    TempSrc: Tympanic    SpO2: 98%    Weight: (!) 305 lb (138.3 kg)            MDM  Number of Diagnoses or Management Options  Diagnosis management comments: 79-year-old male with intermittent left-sided flank pain x1 day. No pain while in the emergency department. He also reported having had bright red blood with bowel movements today. Hemoccult negative on exam.  He does have hemorrhoids without active bleeding and no thrombosis. Laboratory studies reviewed and unremarkable. CT scan is pending. Patient has had screening colonoscopies in the past and was reiterated to them that he needs to follow-up as soon as possible for continued evaluation for concern of rectal bleeding. Patient signed out to  CT scan pending. MIPS       REASSESSMENT          CRITICAL CARE TIME   Total Critical Care time was  minutes, excluding separately reportable procedures. There was a high probability of clinically significant/life threatening deterioration in the patient's condition which required my urgent intervention. CONSULTS:  None    PROCEDURES:  Unless otherwise noted below, none     Procedures        FINAL IMPRESSION      1. Flank pain    2. Rectal bleeding          DISPOSITION/PLAN   DISPOSITION        PATIENT REFERRED TO:    Follow-up first screening colonoscopy as soon as possible          DISCHARGE MEDICATIONS:  Discharge Medication List as of 8/18/2021  9:09 PM        Controlled Substances Monitoring:     No flowsheet data found.     (Please note that portions of this note were completed with a voice recognition program.  Efforts were made to edit the dictations but occasionally words are mis-transcribed.)    Verena Ferguson MD (electronically signed)  Attending Emergency Physician             Verena Ferguson MD  09/07/21 8731

## 2021-08-18 NOTE — ED NOTES
Writer at bedside with Dr. Emperatriz Rehman for rectal exam. Occult blood negative.       Zenon Hampton RN  08/18/21 4088

## 2021-08-19 NOTE — ED NOTES
Patient aware that urine specimen is needed. Patient to restroom at this time to obtain.      Filiberto Jeffery RN  08/18/21 3332

## 2021-08-19 NOTE — ED PROVIDER NOTES
Urinalysis negative next, patient prefers to be discharged and will follow up with his PCP concerning magnesium levels which is still pending      Luz Wagner MD  08/19/21 [id number]       Pretty Padilla MD  08/24/21 7865

## 2021-08-19 NOTE — ED NOTES
Patient sitting at bedside dressed. Discharge BP obtained. Patient states that he knows it will be high because he has not taken his night time medications. Writer offered to obtain patients night time BP medication. Dr. Lois Chanel notified of BP and aware that patient does not want to wait. Patient states he will take medications once returning home. Patient aware that follow up with GI is recommended for a colonoscopy. Patient verbalized understanding of s/s to return to ER for. Denies needs/questions at this time.       Brianna Medina RN  08/18/21 8248

## 2021-08-19 NOTE — ED NOTES
Patient hit call light. States he is ready to be discharged. Spoke with Dr. Adrien Jones who states that we are only waiting on the magnesium to come back and if patients to go without that result he can. Patient updated and states he wishes to leave.       Rupal Thurston RN  08/18/21 2124

## 2021-09-24 ENCOUNTER — OFFICE VISIT (OUTPATIENT)
Dept: PRIMARY CARE CLINIC | Age: 66
End: 2021-09-24
Payer: COMMERCIAL

## 2021-09-24 VITALS
HEIGHT: 71 IN | DIASTOLIC BLOOD PRESSURE: 90 MMHG | BODY MASS INDEX: 43.24 KG/M2 | WEIGHT: 308.9 LBS | HEART RATE: 70 BPM | OXYGEN SATURATION: 98 % | SYSTOLIC BLOOD PRESSURE: 150 MMHG | TEMPERATURE: 97.5 F | RESPIRATION RATE: 20 BRPM

## 2021-09-24 DIAGNOSIS — I10 ESSENTIAL HYPERTENSION: Primary | ICD-10-CM

## 2021-09-24 DIAGNOSIS — K64.4 EXTERNAL HEMORRHOIDS: ICD-10-CM

## 2021-09-24 DIAGNOSIS — Z23 NEED FOR SHINGLES VACCINE: ICD-10-CM

## 2021-09-24 DIAGNOSIS — E78.5 DYSLIPIDEMIA: ICD-10-CM

## 2021-09-24 DIAGNOSIS — J30.2 SEASONAL ALLERGIES: ICD-10-CM

## 2021-09-24 PROBLEM — E22.0 ACROMEGALY (HCC): Status: ACTIVE | Noted: 2021-09-24

## 2021-09-24 PROBLEM — G47.30 SLEEP APNEA: Status: ACTIVE | Noted: 2021-09-24

## 2021-09-24 PROCEDURE — 99204 OFFICE O/P NEW MOD 45 MIN: CPT | Performed by: NURSE PRACTITIONER

## 2021-09-24 RX ORDER — MONTELUKAST SODIUM 10 MG/1
10 TABLET ORAL DAILY
Qty: 90 TABLET | Refills: 0 | Status: SHIPPED | OUTPATIENT
Start: 2021-09-24 | End: 2022-03-24 | Stop reason: SDUPTHER

## 2021-09-24 RX ORDER — PRAMOXINE HYDROCHLORIDE HYDROCORTISONE ACETATE 100; 100 MG/10G; MG/10G
AEROSOL, FOAM TOPICAL
Qty: 10 G | Refills: 1 | Status: SHIPPED | OUTPATIENT
Start: 2021-09-24 | End: 2022-04-05

## 2021-09-24 RX ORDER — LOVASTATIN 40 MG/1
40 TABLET ORAL DAILY
Qty: 90 TABLET | Refills: 3 | Status: SHIPPED | OUTPATIENT
Start: 2021-09-24

## 2021-09-24 RX ORDER — VALSARTAN AND HYDROCHLOROTHIAZIDE 320; 12.5 MG/1; MG/1
1 TABLET, FILM COATED ORAL DAILY
Qty: 90 TABLET | Refills: 3 | Status: SHIPPED | OUTPATIENT
Start: 2021-09-24

## 2021-09-24 RX ORDER — ZOSTER VACCINE RECOMBINANT, ADJUVANTED 50 MCG/0.5
0.5 KIT INTRAMUSCULAR SEE ADMIN INSTRUCTIONS
Qty: 0.5 ML | Refills: 0 | Status: SHIPPED | OUTPATIENT
Start: 2021-09-24 | End: 2022-03-01 | Stop reason: ALTCHOICE

## 2021-09-24 RX ORDER — HYDRALAZINE HYDROCHLORIDE 50 MG/1
50 TABLET, FILM COATED ORAL 3 TIMES DAILY
Qty: 270 TABLET | Refills: 3 | Status: SHIPPED | OUTPATIENT
Start: 2021-09-24

## 2021-09-24 SDOH — ECONOMIC STABILITY: FOOD INSECURITY: WITHIN THE PAST 12 MONTHS, THE FOOD YOU BOUGHT JUST DIDN'T LAST AND YOU DIDN'T HAVE MONEY TO GET MORE.: PATIENT DECLINED

## 2021-09-24 SDOH — ECONOMIC STABILITY: FOOD INSECURITY: WITHIN THE PAST 12 MONTHS, YOU WORRIED THAT YOUR FOOD WOULD RUN OUT BEFORE YOU GOT MONEY TO BUY MORE.: PATIENT DECLINED

## 2021-09-24 ASSESSMENT — SOCIAL DETERMINANTS OF HEALTH (SDOH): HOW HARD IS IT FOR YOU TO PAY FOR THE VERY BASICS LIKE FOOD, HOUSING, MEDICAL CARE, AND HEATING?: PATIENT DECLINED

## 2021-09-24 ASSESSMENT — PATIENT HEALTH QUESTIONNAIRE - PHQ9
SUM OF ALL RESPONSES TO PHQ QUESTIONS 1-9: 0
2. FEELING DOWN, DEPRESSED OR HOPELESS: 0
SUM OF ALL RESPONSES TO PHQ QUESTIONS 1-9: 0
1. LITTLE INTEREST OR PLEASURE IN DOING THINGS: 0
SUM OF ALL RESPONSES TO PHQ QUESTIONS 1-9: 0
SUM OF ALL RESPONSES TO PHQ9 QUESTIONS 1 & 2: 0

## 2021-09-24 NOTE — PATIENT INSTRUCTIONS
SURVEY:    You may be receiving a survey from [employer] regarding your visit today. Please complete the survey to enable us to provide the highest quality of care to you and your family. If you cannot score us a very good on any question, please call the office to discuss how we could have made your experience a very good one. Thank you.   Lacona Kaylin Nieves, APRN-KING Judd, KING Andres, RUDDY Miller, RUDDY Pool, [place]  Itzel, PCA

## 2021-09-24 NOTE — PROGRESS NOTES
Name: Janine Cruz  : 1955         Chief Complaint:     Chief Complaint   Patient presents with    Establish Care     No PCP. Hx acromegaly. Hemorrhoids       History of Present Illness:      Janine Cruz is a 61 y.o.  male who presents with Establish Care (No PCP. Hx acromegaly. Hemorrhoids)      Alver Pouch is here today to establish care. Overall feeling well, blood pressure has been running a little high, had been managed by his endocrinologist who manages acromegaly and hypothyroidism. Eternal hemorrhoids- bothersome from time to time with bleeding, has not used anything OTC    Hypertension  This is a chronic problem. The current episode started more than 1 year ago. The problem has been gradually worsening since onset. The problem is uncontrolled. Pertinent negatives include no anxiety, blurred vision, chest pain, headaches, malaise/fatigue, neck pain, orthopnea, palpitations, peripheral edema, PND, shortness of breath or sweats. There are no associated agents to hypertension. Risk factors for coronary artery disease include male gender, obesity, family history, dyslipidemia and stress. Past treatments include angiotensin blockers, diuretics, beta blockers and direct vasodilators. The current treatment provides mild improvement. Compliance problems include exercise and diet. There is no history of kidney disease, CAD/MI, CVA or heart failure. There is no history of chronic renal disease. Hyperlipidemia  This is a chronic problem. The current episode started more than 1 year ago. The problem is controlled. Recent lipid tests were reviewed and are normal. He has no history of chronic renal disease. Factors aggravating his hyperlipidemia include beta blockers. Pertinent negatives include no chest pain, focal weakness, myalgias or shortness of breath. Current antihyperlipidemic treatment includes statins. The current treatment provides moderate improvement of lipids.  Compliance problems include adherence to exercise. Risk factors for coronary artery disease include dyslipidemia, family history, obesity, male sex, hypertension and stress. URI   This is a chronic problem. The current episode started more than 1 year ago. The problem has been waxing and waning. There has been no fever. Associated symptoms include congestion, coughing, rhinorrhea, sneezing and a sore throat. Pertinent negatives include no abdominal pain, chest pain, diarrhea, dysuria, ear pain, headaches, joint pain, joint swelling, nausea, neck pain, plugged ear sensation, rash, sinus pain, swollen glands, vomiting or wheezing. He has tried nothing for the symptoms. The treatment provided no relief. Past Medical History:     Past Medical History:   Diagnosis Date    Acromegalia ([address]     Arthritis     H/O echocardiogram 09/13/2017    poor image quality. EF 60%. Moderate LV hypertrophy normal LV cavity size. Unable to assess specific wall motion abnormalities due to image quality. No significant valvular abnormalities. Mild diastolic dysfunction is seen.  History of stress test     25+ years ago. per patient it was normal    History of stress test 09/14/2017    Equivocal study. Moderate perfusion defect of moderate intensity in the inferolateral and inferior regions during stress and rest imaging. EF  71% without regional wall motion abnormalities. No significant electrocardiographic evidence of MI during EKG Vu Treadmill score 6,low risk for CAD.     Hyperlipidemia     Hypertension     Left retinal detachment     Sleep apnea     C-PAP    Thyroid disease     HYPOTHYROID    Ureteral stone     history      Reviewed all health maintenance requirements and ordered appropriate tests  Health Maintenance Due   Topic Date Due    Colon cancer screen colonoscopy  Never done    Shingles Vaccine (1 of 2) Never done    A1C test (Diabetic or Prediabetic)  10/02/2018    Lipid screen  10/02/2018       Past Surgical History:     Past Surgical History:   Procedure Laterality Date    CATARACT REMOVAL Right     CATARACT REMOVAL      CATARACT REMOVAL WITH IMPLANT Left 07/13/2020    [facility]/    COLONOSCOPY  2014    CYSTOSCOPY Right 03/10/2017    EYE SURGERY      HIP ARTHROPLASTY Right     KOLE    INTRACAPSULAR CATARACT EXTRACTION Left 7/13/2020    EYE CATARACT EMULSIFICATION IOL IMPLANT performed by Steff Miller DO at [address] [facility] [place] LITHOTRIPSY Right 11/24/2020    ESWL EXTRACORPOREAL SHOCK WAVE LITHOTRIPSY (Right )     LITHOTRIPSY Right 11/24/2020    ESWL EXTRACORPOREAL SHOCK WAVE LITHOTRIPSY performed by Oniel Simon MD at [address] [facility] by [facility] Bilateral     SHOULDER SURGERY      TOTAL KNEE ARTHROPLASTY Bilateral     VASCULAR SURGERY      VITRECTOMY Left 03/04/2020    VITRECTOMY Left 3/4/2020    VITRECTOMY 25 GAUGE, AIR FLUID EXCHANGE, ENDOLASER 200MW 200MS 607  SPOTS , AIR GAS EXCHANGE WITH 20 % SF6 performed by Pete Cortes MD at [place] [address]        Medications:       Prior to Admission medications    Medication Sig Start Date End Date Taking?  Authorizing Provider   zoster recombinant adjuvanted vaccine [facility]) 50 MCG/0.5ML SUSR injection Inject 0.5 mLs into the muscle See Admin Instructions 1 dose now and repeat in 2-6 months 9/24/21 3/23/22 Yes Lori Nieves APRN - CNP   valsartan-hydroCHLOROthiazide (DIOVAN-HCT) 320-12.5 MG per tablet Take 1 tablet by mouth daily 9/24/21  Yes Lori Nieves APRN - CNP   lovastatin (MEVACOR) 40 MG tablet Take 1 tablet by mouth daily 9/24/21  Yes Lori Nieves APRN - CNP   hydrALAZINE (APRESOLINE) 50 MG tablet Take 1 tablet by mouth 3 times daily 9/24/21  Yes Lori Nieves APRN - CNP   metoprolol tartrate (LOPRESSOR) 25 MG tablet Take 1 tablet by mouth daily 9/24/21  Yes Lori Nieves APRN - CNP   Hydrocort-Pramoxine, Perianal, (PROCTOFOAM HC) 1-1 % rectal foam Apply to rectal area 2 times daily as needed 9/24/21  Yes Keyshawn Nancy Might, APRN - CNP   montelukast (SINGULAIR) 10 MG tablet Take 1 tablet by mouth daily 9/24/21  Yes Keyshawn Nancy Might, APRN - CNP   aspirin 81 MG EC tablet Take 81 mg by mouth daily   Yes Historical Provider, MD   Testosterone 1.62 % GEL Place 2 Pump onto the skin daily   Yes Historical Provider, MD   polyethylene glycol (GLYCOLAX) packet Take 17 g by mouth nightly   Yes Historical Provider, MD   NONFORMULARY mylan/levothyrox 0.175 mg daily   Yes Historical Provider, MD   Ascorbic Acid (VITAMIN C) 500 MG CAPS Take by mouth daily   Yes Historical Provider, MD   Omega-3 Fatty Acids (FISH OIL PO) Take 1 capsule by mouth 3 times daily Indications: LAST DOSE 2/27/2020    Yes Historical Provider, MD        Allergies:       Patient has no known allergies. Social History:     Tobacco:    reports that he has never smoked. He has never used smokeless tobacco.  Alcohol:      reports current alcohol use. Drug Use:  reports no history of drug use. Family History:     Family History   Problem Relation Age of Onset    Diabetes Mother     Alzheimer's Disease Mother        Review of Systems:     Positive and Negative as described in HPI    Review of Systems   Constitutional: Negative for malaise/fatigue. HENT: Positive for congestion, rhinorrhea, sneezing and sore throat. Negative for ear pain and sinus pain. Eyes: Negative for blurred vision. Respiratory: Positive for cough. Negative for shortness of breath and wheezing. Cardiovascular: Negative for chest pain, palpitations, orthopnea and PND. Gastrointestinal: Positive for anal bleeding. Negative for abdominal pain, diarrhea, nausea and vomiting. Genitourinary: Negative for dysuria. Musculoskeletal: Negative for joint pain, myalgias and neck pain. Skin: Negative for rash. Neurological: Negative for focal weakness and headaches.        Physical Exam:   Vitals:  BP (!) 150/90 (Site: Right Upper Arm, Position: Sitting)   Pulse 70   Temp 97.5 °F (36.4 °C) (Temporal)   Resp 20   Ht 5' 11\" (1.803 m)   Wt (!) 308 lb 14.4 oz (140.1 kg)   SpO2 98%   BMI 43.08 kg/m²     Physical Exam  Vitals and nursing note reviewed. Constitutional:       General: He is not in acute distress. Appearance: Normal appearance. He is well-developed. He is obese. He is not ill-appearing. HENT:      Nose: Mucosal edema and congestion present. Right Turbinates: Swollen and pale. Left Turbinates: Swollen and pale. Mouth/Throat:      Mouth: Mucous membranes are moist. Mucous membranes are not dry. Pharynx: Posterior oropharyngeal erythema present. Eyes:      General: No scleral icterus. Conjunctiva/sclera: Conjunctivae normal.   Neck:      Vascular: No carotid bruit. Cardiovascular:      Rate and Rhythm: Normal rate and regular rhythm. Heart sounds: No murmur heard. Pulmonary:      Effort: Pulmonary effort is normal.      Breath sounds: Normal breath sounds. No wheezing or rales. Abdominal:      General: Bowel sounds are normal. There is no distension. Palpations: Abdomen is soft. Tenderness: There is no abdominal tenderness. Musculoskeletal:      Cervical back: Normal range of motion and neck supple. Lymphadenopathy:      Cervical: No cervical adenopathy. Skin:     General: Skin is warm and dry. Findings: No rash. Neurological:      Mental Status: He is alert and oriented to person, place, and time.    Psychiatric:         Mood and Affect: Mood normal.         Behavior: Behavior normal.         Data:     Lab Results   Component Value Date     08/18/2021    K 3.4 08/18/2021     08/18/2021    CO2 27 08/18/2021    BUN 16 08/18/2021    CREATININE 0.91 08/18/2021    GLUCOSE 114 08/18/2021    PROT 7.0 08/18/2021    LABALBU 4.2 08/18/2021    BILITOT 0.50 08/18/2021    ALKPHOS 72 08/18/2021    AST 31 08/18/2021    ALT 39 08/18/2021     Lab Results   Component Value Date WBC 5.7 08/18/2021    RBC 4.93 08/18/2021    HGB 14.0 08/18/2021    HCT 42.0 08/18/2021    MCV 85.2 08/18/2021    MCH 28.4 08/18/2021    MCHC 33.3 08/18/2021    RDW 12.9 08/18/2021     08/18/2021    MPV 8.9 08/18/2021     Lab Results   Component Value Date    TSH 0.65 07/25/2017     Lab Results   Component Value Date    CHOL 115 10/02/2017    HDL 37 10/02/2017    PSA 0.18 02/25/2021    LABA1C 5.8 10/02/2017       Assessment/Plan:      Diagnosis Orders   1. Essential hypertension  valsartan-hydroCHLOROthiazide (DIOVAN-HCT) 320-12.5 MG per tablet    hydrALAZINE (APRESOLINE) 50 MG tablet    metoprolol tartrate (LOPRESSOR) 25 MG tablet   2. Dyslipidemia  lovastatin (MEVACOR) 40 MG tablet   3. External hemorrhoids  Hydrocort-Pramoxine, Perianal, (PROCTOFOAM HC) 1-1 % rectal foam   4. Seasonal allergies  montelukast (SINGULAIR) 10 MG tablet   5. Need for shingles vaccine  zoster recombinant adjuvanted vaccine [facility]) 50 MCG/0.5ML SUSR injection     Will continue all medications with the following changes:  Increase dosing of metoprolol tartrate to 2 times daily  Increase dose of valsartan/HCTZ to 360/12.5 daily  Add montelukast 10 mg daily  Add Proctofoam 2 times daily as needed    Will obtain most recent labs from [facility]    2 week BP check    We will see him back in 6 months, sooner if any problems. 1.  Luc You received counseling on the following healthy behaviors: nutrition, exercise and medication adherence  2. Patient given educational materials - see patient instructions  3. Was a self-tracking handout given in paper form or via [email or web address]? No  If yes, see orders or list here. 4.  Discussed use, benefit, and side effects of prescribed medications. Barriers to medication compliance addressed. All patient questions answered. Pt voiced understanding. 5.  Reviewed prior labs and health maintenance  6. Continue current medications, diet and exercise.     Completed Refills   Requested Prescriptions Signed Prescriptions Disp Refills    zoster recombinant adjuvanted vaccine (SHINGRIX) 50 MCG/0.5ML SUSR injection 0.5 mL 0     Sig: Inject 0.5 mLs into the muscle See Admin Instructions 1 dose now and repeat in 2-6 months    valsartan-hydroCHLOROthiazide (DIOVAN-HCT) 320-12.5 MG per tablet 90 tablet 3     Sig: Take 1 tablet by mouth daily    lovastatin (MEVACOR) 40 MG tablet 90 tablet 3     Sig: Take 1 tablet by mouth daily    hydrALAZINE (APRESOLINE) 50 MG tablet 270 tablet 3     Sig: Take 1 tablet by mouth 3 times daily    metoprolol tartrate (LOPRESSOR) 25 MG tablet 180 tablet 3     Sig: Take 1 tablet by mouth daily    Hydrocort-Pramoxine, Perianal, (PROCTOFOAM HC) 1-1 % rectal foam 10 g 1     Sig: Apply to rectal area 2 times daily as needed    montelukast (SINGULAIR) 10 MG tablet 90 tablet 0     Sig: Take 1 tablet by mouth daily         Return in about 6 months (around 3/24/2022) for Check up.

## 2021-09-25 ASSESSMENT — ENCOUNTER SYMPTOMS
SINUS PAIN: 0
ABDOMINAL PAIN: 0
NAUSEA: 0
VOMITING: 0
SORE THROAT: 1
WHEEZING: 0
SWOLLEN GLANDS: 0
RHINORRHEA: 1
ANAL BLEEDING: 1
BLURRED VISION: 0
COUGH: 1
ORTHOPNEA: 0
SHORTNESS OF BREATH: 0
DIARRHEA: 0

## 2021-10-08 ENCOUNTER — NURSE ONLY (OUTPATIENT)
Dept: PRIMARY CARE CLINIC | Age: 66
End: 2021-10-08

## 2021-10-08 VITALS
RESPIRATION RATE: 20 BRPM | TEMPERATURE: 97.8 F | HEART RATE: 74 BPM | WEIGHT: 304.2 LBS | OXYGEN SATURATION: 97 % | SYSTOLIC BLOOD PRESSURE: 122 MMHG | DIASTOLIC BLOOD PRESSURE: 78 MMHG | BODY MASS INDEX: 42.43 KG/M2

## 2021-10-08 DIAGNOSIS — I10 ESSENTIAL HYPERTENSION: Primary | ICD-10-CM

## 2021-10-08 NOTE — PATIENT INSTRUCTIONS
SURVEY:    You may be receiving a survey from [employer] regarding your visit today. Please complete the survey to enable us to provide the highest quality of care to you and your family. If you cannot score us a very good on any question, please call the office to discuss how we could have made your experience a very good one. Thank you. Helder Nieves, APRN-KING Ayala, KING Galarza, RUDDY Stringer, RUDDY Nolan, [place]  Itzel, PCA    Patient Education        High Blood Pressure: Care Instructions  Overview     It's normal for blood pressure to go up and down throughout the day. But if it stays up, you have high blood pressure. Another name for high blood pressure is hypertension. Despite what a lot of people think, high blood pressure usually doesn't cause headaches or make you feel dizzy or lightheaded. It usually has no symptoms. But it does increase your risk of stroke, heart attack, and other problems. You and your doctor will talk about your risks of these problems based on your blood pressure. Your doctor will give you a goal for your blood pressure. Your goal will be based on your health and your age. Lifestyle changes, such as eating healthy and being active, are always important to help lower blood pressure. You might also take medicine to reach your blood pressure goal.  Follow-up care is a key part of your treatment and safety. Be sure to make and go to all appointments, and call your doctor if you are having problems. It's also a good idea to know your test results and keep a list of the medicines you take. How can you care for yourself at home? Medical treatment  · If you stop taking your medicine, your blood pressure will go back up. You may take one or more types of medicine to lower your blood pressure. Be safe with medicines. Take your medicine exactly as prescribed. Call your doctor if you think you are having a problem with your medicine.   · Talk to your doctor before you start taking aspirin every day. Aspirin can help certain people lower their risk of a heart attack or stroke. But taking aspirin isn't right for everyone, because it can cause serious bleeding. · See your doctor regularly. You may need to see the doctor more often at first or until your blood pressure comes down. · If you are taking blood pressure medicine, talk to your doctor before you take decongestants or anti-inflammatory medicine, such as ibuprofen. Some of these medicines can raise blood pressure. · Learn how to check your blood pressure at home. Lifestyle changes  · Stay at a healthy weight. This is especially important if you put on weight around the waist. Losing even 10 pounds can help you lower your blood pressure. · If your doctor recommends it, get more exercise. Walking is a good choice. Bit by bit, increase the amount you walk every day. Try for at least 30 minutes on most days of the week. You also may want to swim, bike, or do other activities. · Avoid or limit alcohol. Talk to your doctor about whether you can drink any alcohol. · Try to limit how much sodium you eat to less than 2,300 milligrams (mg) a day. Your doctor may ask you to try to eat less than 1,500 mg a day. · Eat plenty of fruits (such as bananas and oranges), vegetables, legumes, whole grains, and low-fat dairy products. · Lower the amount of saturated fat in your diet. Saturated fat is found in animal products such as milk, cheese, and meat. Limiting these foods may help you lose weight and also lower your risk for heart disease. · Do not smoke. Smoking increases your risk for heart attack and stroke. If you need help quitting, talk to your doctor about stop-smoking programs and medicines. These can increase your chances of quitting for good. When should you call for help? Call 911  anytime you think you may need emergency care.  This may mean having symptoms that suggest that your blood pressure is causing a serious heart or blood vessel problem. Your blood pressure may be over 180/120. For example, call 911 if:    · You have symptoms of a heart attack. These may include:  ? Chest pain or pressure, or a strange feeling in the chest.  ? Sweating. ? Shortness of breath. ? Nausea or vomiting. ? Pain, pressure, or a strange feeling in the back, neck, jaw, or upper belly or in one or both shoulders or arms. ? Lightheadedness or sudden weakness. ? A fast or irregular heartbeat.     · You have symptoms of a stroke. These may include:  ? Sudden numbness, tingling, weakness, or loss of movement in your face, arm, or leg, especially on only one side of your body. ? Sudden vision changes. ? Sudden trouble speaking. ? Sudden confusion or trouble understanding simple statements. ? Sudden problems with walking or balance. ? A sudden, severe headache that is different from past headaches.     · You have severe back or belly pain. Do not wait until your blood pressure comes down on its own. Get help right away. Call your doctor now or seek immediate care if:    · Your blood pressure is much higher than normal (such as 180/120 or higher), but you don't have symptoms.     · You think high blood pressure is causing symptoms, such as:  ? Severe headache.  ? Blurry vision. Watch closely for changes in your health, and be sure to contact your doctor if:    · Your blood pressure measures higher than your doctor recommends at least 2 times. That means the top number is higher or the bottom number is higher, or both.     · You think you may be having side effects from your blood pressure medicine. Where can you learn more? Go to https://[employer].registracija vozila. org and sign in to your [facility] account. Enter B463 in the [employer] box to learn more about \"High Blood Pressure: Care Instructions. \"     If you do not have an account, please click on the \"Sign Up Now\" link.   Current as of: April 29, 2021               Content Version: 13.0  © 2006-2021 [employer]. Care instructions adapted under license by [facility] ([facility]). If you have questions about a medical condition or this instruction, always ask your healthcare professional. [address] any warranty or liability for your use of this information.

## 2022-02-23 ENCOUNTER — TELEPHONE (OUTPATIENT)
Dept: UROLOGY | Age: 67
End: 2022-02-23

## 2022-02-23 DIAGNOSIS — Z12.5 SCREENING PSA (PROSTATE SPECIFIC ANTIGEN): Primary | ICD-10-CM

## 2022-02-28 ENCOUNTER — HOSPITAL ENCOUNTER (OUTPATIENT)
Dept: GENERAL RADIOLOGY | Age: 67
Discharge: HOME | End: 2022-03-02
Payer: COMMERCIAL

## 2022-02-28 ENCOUNTER — HOSPITAL ENCOUNTER (OUTPATIENT)
Age: 67
Discharge: HOME | End: 2022-03-02
Payer: COMMERCIAL

## 2022-02-28 ENCOUNTER — HOSPITAL ENCOUNTER (OUTPATIENT)
Age: 67
Discharge: HOME | End: 2022-02-28
Payer: COMMERCIAL

## 2022-02-28 DIAGNOSIS — Z12.5 SCREENING PSA (PROSTATE SPECIFIC ANTIGEN): ICD-10-CM

## 2022-02-28 DIAGNOSIS — N20.0 RENAL STONES: ICD-10-CM

## 2022-02-28 LAB — PROSTATE SPECIFIC ANTIGEN: 0.1 UG/L

## 2022-02-28 PROCEDURE — G0103 PSA SCREENING: HCPCS

## 2022-02-28 PROCEDURE — 36415 COLL VENOUS BLD VENIPUNCTURE: CPT

## 2022-02-28 PROCEDURE — 74018 RADEX ABDOMEN 1 VIEW: CPT

## 2022-03-01 ENCOUNTER — HOSPITAL ENCOUNTER (OUTPATIENT)
Age: 67
Setting detail: SPECIMEN
Discharge: HOME | End: 2022-03-01
Payer: COMMERCIAL

## 2022-03-01 ENCOUNTER — OFFICE VISIT (OUTPATIENT)
Dept: UROLOGY | Age: 67
End: 2022-03-01
Payer: COMMERCIAL

## 2022-03-01 VITALS
WEIGHT: 307 LBS | DIASTOLIC BLOOD PRESSURE: 88 MMHG | SYSTOLIC BLOOD PRESSURE: 125 MMHG | TEMPERATURE: 96.9 F | HEIGHT: 71 IN | BODY MASS INDEX: 42.98 KG/M2 | HEART RATE: 73 BPM

## 2022-03-01 DIAGNOSIS — N39.41 URGE INCONTINENCE: ICD-10-CM

## 2022-03-01 DIAGNOSIS — R39.15 URGENCY OF URINATION: ICD-10-CM

## 2022-03-01 DIAGNOSIS — N40.1 BPH WITH OBSTRUCTION/LOWER URINARY TRACT SYMPTOMS: ICD-10-CM

## 2022-03-01 DIAGNOSIS — N13.8 BPH WITH OBSTRUCTION/LOWER URINARY TRACT SYMPTOMS: ICD-10-CM

## 2022-03-01 DIAGNOSIS — Z12.5 SCREENING PSA (PROSTATE SPECIFIC ANTIGEN): ICD-10-CM

## 2022-03-01 DIAGNOSIS — N20.0 RENAL STONES: Primary | ICD-10-CM

## 2022-03-01 LAB
-: ABNORMAL
BACTERIA: ABNORMAL
BILIRUBIN URINE: ABNORMAL
CASTS UA: ABNORMAL /LPF
CASTS UA: ABNORMAL /LPF
COLOR: YELLOW
CRYSTALS, UA: ABNORMAL /HPF
CRYSTALS, UA: ABNORMAL /HPF
EPITHELIAL CELLS UA: ABNORMAL /HPF (ref 0–5)
GLUCOSE URINE: NEGATIVE
KETONES, URINE: ABNORMAL
LEUKOCYTE ESTERASE, URINE: NEGATIVE
MUCUS: ABNORMAL
NITRITE, URINE: NEGATIVE
PH UA: 5.5 (ref 5–9)
PROTEIN UA: NEGATIVE
RBC UA: ABNORMAL /HPF (ref 0–2)
SPECIFIC GRAVITY UA: >1.03 (ref 1.01–1.02)
TURBIDITY: CLEAR
URINE HGB: NEGATIVE
UROBILINOGEN, URINE: NORMAL
WBC UA: ABNORMAL /HPF (ref 0–5)

## 2022-03-01 PROCEDURE — 51798 US URINE CAPACITY MEASURE: CPT | Performed by: NURSE PRACTITIONER

## 2022-03-01 PROCEDURE — 81001 URINALYSIS AUTO W/SCOPE: CPT

## 2022-03-01 PROCEDURE — 87086 URINE CULTURE/COLONY COUNT: CPT

## 2022-03-01 PROCEDURE — 99214 OFFICE O/P EST MOD 30 MIN: CPT | Performed by: NURSE PRACTITIONER

## 2022-03-01 RX ORDER — TAMSULOSIN HYDROCHLORIDE 0.4 MG/1
0.4 CAPSULE ORAL DAILY
Qty: 30 CAPSULE | Refills: 1 | Status: SHIPPED | OUTPATIENT
Start: 2022-03-01 | End: 2022-04-05 | Stop reason: SDUPTHER

## 2022-03-01 RX ORDER — IBUPROFEN 600 MG/1
TABLET ORAL
COMMUNITY
Start: 2022-02-08

## 2022-03-01 ASSESSMENT — ENCOUNTER SYMPTOMS
NAUSEA: 0
EYE REDNESS: 0
CONSTIPATION: 0
BACK PAIN: 0
COLOR CHANGE: 0
WHEEZING: 0
COUGH: 0
SHORTNESS OF BREATH: 0
VOMITING: 0
ABDOMINAL PAIN: 0

## 2022-03-01 NOTE — PROGRESS NOTES
HPI:          Patient is a 61 y.o. male in no acute distress. He is alert and oriented to person, place, and time. History  Acromegalia - follows with endocrinology    3/2017 right HLL for 8mm ureteral stone    11/2020 Right ESWL for a 7mm right renal stone    Today  Here today for annual stone follow-up and prostate cancer screening. He denies any flank or abdominal pain, dysuria or gross hematuria. He did have a KUB completed prior to today's visit. This film was independently reviewed and shows no evidence of  calcifications. Most recent PSA is 0.10. He has noticed urgency and intermittent urge incontinence. PVR is low, 0ml. He denies dysuria or gross hematuria. He does have a daily bowel movement with MiraLAX. He denies consumption of known bladder irritants. He does have sleep apnea, but is compliant with his CPAP machine. Past Medical History:   Diagnosis Date    Acromegalia ([address]     Arthritis     H/O echocardiogram 09/13/2017    poor image quality. EF 60%. Moderate LV hypertrophy normal LV cavity size. Unable to assess specific wall motion abnormalities due to image quality. No significant valvular abnormalities. Mild diastolic dysfunction is seen.  History of stress test     25+ years ago. per patient it was normal    History of stress test 09/14/2017    Equivocal study. Moderate perfusion defect of moderate intensity in the inferolateral and inferior regions during stress and rest imaging. EF  71% without regional wall motion abnormalities. No significant electrocardiographic evidence of MI during EKG Vu Treadmill score 6,low risk for CAD.     Hyperlipidemia     Hypertension     Left retinal detachment     Sleep apnea     C-PAP    Thyroid disease     HYPOTHYROID    Ureteral stone     history     Past Surgical History:   Procedure Laterality Date    CATARACT REMOVAL Right     CATARACT REMOVAL      CATARACT REMOVAL WITH IMPLANT Left 07/13/2020 IQRA/    COLONOSCOPY  2014    CYSTOSCOPY Right 03/10/2017    EYE SURGERY      HIP ARTHROPLASTY Right     KOLE    INTRACAPSULAR CATARACT EXTRACTION Left 7/13/2020    EYE CATARACT EMULSIFICATION IOL IMPLANT performed by Jeanette Dill DO at [address] [place] [address] [place] LITHOTRIPSY Right 11/24/2020    ESWL EXTRACORPOREAL SHOCK WAVE LITHOTRIPSY (Right )     LITHOTRIPSY Right 11/24/2020    ESWL EXTRACORPOREAL SHOCK WAVE LITHOTRIPSY performed by Mary Wilson MD at [facility] TUMOR REMOVAL    SHOULDER ARTHROPLASTY Bilateral     SHOULDER SURGERY      TOTAL KNEE ARTHROPLASTY Bilateral     VASCULAR SURGERY      VITRECTOMY Left 03/04/2020    VITRECTOMY Left 3/4/2020    VITRECTOMY 25 GAUGE, AIR FLUID EXCHANGE, ENDOLASER 200MW 200MS 607  SPOTS , AIR GAS EXCHANGE WITH 20 % SF6 performed by Ina Kimball MD at [facility] Encounter Medications as of 3/1/2022   Medication Sig Dispense Refill    ibuprofen (ADVIL;MOTRIN) 600 MG tablet       tamsulosin (FLOMAX) 0.4 MG capsule Take 1 capsule by mouth daily 30 capsule 1    valsartan-hydroCHLOROthiazide (DIOVAN-HCT) 320-12.5 MG per tablet Take 1 tablet by mouth daily 90 tablet 3    lovastatin (MEVACOR) 40 MG tablet Take 1 tablet by mouth daily 90 tablet 3    hydrALAZINE (APRESOLINE) 50 MG tablet Take 1 tablet by mouth 3 times daily 270 tablet 3    metoprolol tartrate (LOPRESSOR) 25 MG tablet Take 1 tablet by mouth daily 180 tablet 3    Hydrocort-Pramoxine, Perianal, (PROCTOFOAM HC) 1-1 % rectal foam Apply to rectal area 2 times daily as needed 10 g 1    montelukast (SINGULAIR) 10 MG tablet Take 1 tablet by mouth daily 90 tablet 0    aspirin 81 MG EC tablet Take 81 mg by mouth daily      Testosterone 1.62 % GEL Place 2 Pump onto the skin daily      polyethylene glycol (GLYCOLAX) packet Take 17 g by mouth nightly      NONFORMULARY mylan/levothyrox 0.175 mg daily      Ascorbic Acid (VITAMIN C) 500 MG CAPS Take by mouth daily      Omega-3 Fatty Acids (FISH OIL PO) Take 1 capsule by mouth 3 times daily Indications: LAST DOSE 2/27/2020       [DISCONTINUED] zoster recombinant adjuvanted vaccine [facility]) 50 MCG/0.5ML SUSR injection Inject 0.5 mLs into the muscle See Admin Instructions 1 dose now and repeat in 2-6 months 0.5 mL 0     No facility-administered encounter medications on file as of 3/1/2022. Current Outpatient Medications on File Prior to Visit   Medication Sig Dispense Refill    ibuprofen (ADVIL;MOTRIN) 600 MG tablet       valsartan-hydroCHLOROthiazide (DIOVAN-HCT) 320-12.5 MG per tablet Take 1 tablet by mouth daily 90 tablet 3    lovastatin (MEVACOR) 40 MG tablet Take 1 tablet by mouth daily 90 tablet 3    hydrALAZINE (APRESOLINE) 50 MG tablet Take 1 tablet by mouth 3 times daily 270 tablet 3    metoprolol tartrate (LOPRESSOR) 25 MG tablet Take 1 tablet by mouth daily 180 tablet 3    Hydrocort-Pramoxine, Perianal, (PROCTOFOAM HC) 1-1 % rectal foam Apply to rectal area 2 times daily as needed 10 g 1    montelukast (SINGULAIR) 10 MG tablet Take 1 tablet by mouth daily 90 tablet 0    aspirin 81 MG EC tablet Take 81 mg by mouth daily      Testosterone 1.62 % GEL Place 2 Pump onto the skin daily      polyethylene glycol (GLYCOLAX) packet Take 17 g by mouth nightly      NONFORMULARY mylan/levothyrox 0.175 mg daily      Ascorbic Acid (VITAMIN C) 500 MG CAPS Take by mouth daily      Omega-3 Fatty Acids (FISH OIL PO) Take 1 capsule by mouth 3 times daily Indications: LAST DOSE 2/27/2020        No current facility-administered medications on file prior to visit. Patient has no known allergies.   Family History   Problem Relation Age of Onset    Diabetes Mother     Alzheimer's Disease Mother      Social History     Tobacco Use   Smoking Status Never Smoker   Smokeless Tobacco Never Used       Social History     Substance and Sexual Activity   Alcohol Use Yes    Comment: occ Review of Systems   Constitutional: Negative for appetite change, chills and fever. Eyes: Negative for redness and visual disturbance. Respiratory: Negative for cough, shortness of breath and wheezing. Cardiovascular: Negative for chest pain and leg swelling. Gastrointestinal: Negative for abdominal pain, constipation, nausea and vomiting. Genitourinary: Negative for decreased urine volume, difficulty urinating, dysuria, enuresis, flank pain, frequency, hematuria, penile discharge, penile pain, scrotal swelling, testicular pain and urgency. Musculoskeletal: Negative for back pain, joint swelling and myalgias. Skin: Negative for color change, rash and wound. Neurological: Negative for dizziness, tremors and numbness. Hematological: Negative for adenopathy. Does not bruise/bleed easily. /88 (Site: Right Upper Arm, Position: Sitting, Cuff Size: Large Adult)   Pulse 73   Temp 96.9 °F (36.1 °C) (Temporal)   Ht 5' 11\" (1.803 m)   Wt (!) 307 lb (139.3 kg)   BMI 42.82 kg/m²       PHYSICAL EXAM:  Constitutional: Patient in no acute distress; Neuro: alert and oriented to person place and time. Psych: Mood and affect normal.  Skin: Normal  Lungs: Respiratory effort normal  Cardiovascular:  Normal peripheral pulses  Abdomen: Soft, non-tender, non-distended with no CVA, flank pain  Bladder non-tender and not distended. Lymphatics: no palpable lymphadenopathy  Penis normal  Urethral meatus normal  Scrotal exam normal  Testicles normal bilaterally  Epididymis normal bilaterally  No evidence of inguinal hernia  Anus and perineum normal  Normal rectal tone with no masses  Prostate soft, non-tender to palpation. No palpable nodules. Estimated 30 grams. Seminal vesicles not palpable.        Lab Results   Component Value Date    BUN 16 08/18/2021     Lab Results   Component Value Date    CREATININE 0.91 08/18/2021     Lab Results   Component Value Date    PSA 0.10 02/28/2022    PSA 0.18 02/25/2021    PSA 0.59 05/23/2019       ASSESSMENT:   Diagnosis Orders   1. Renal stones  XR ABDOMEN (KUB) (SINGLE AP VIEW)   2. Screening PSA (prostate specific antigen)  PSA Screening   3. BPH with obstruction/lower urinary tract symptoms  Culture, Urine    Urinalysis with Microscopic    TX MEASUREMENT,POST-VOID RESIDUAL VOLUME BY US,NON-IMAGING   4. Urgency of urination  Culture, Urine    Urinalysis with Microscopic    TX MEASUREMENT,POST-VOID RESIDUAL VOLUME BY US,NON-IMAGING   5. Urge incontinence  Culture, Urine    Urinalysis with Microscopic    TX MEASUREMENT,POST-VOID RESIDUAL VOLUME BY US,NON-IMAGING         PLAN:  We will check a UA C&S    He will start Flomax daily    To prevent future stone formation:  1) drink around 80 ounces of water per day  2) Avoid/minimize intake of \"bad fluids\" (soda pop, coffee, tea, alcohol, energy drinks)  3) reduce consumption of sodium/salt  4) reduce consumption of fatty animal protein (full-fat cheese/milk/dairy, red meats, pork). Limit protein intake to low-fat/lean options (low-fat dairy, fish, chicken, turkey)    KUB and PSA will be due in 1 year.   FELICIA will be due in 2 years    We will see him in 4-6 weeks to reevaluate his urinary symptoms

## 2022-03-01 NOTE — PATIENT INSTRUCTIONS
SURVEY:    You may be receiving a survey from [employer] regarding your visit today. Please complete the survey to enable us to provide the highest quality of care to you and your family. If you cannot score us a very good on any question, please call the office to discuss how we could of made your experience a very good one. Thank you.

## 2022-03-02 ENCOUNTER — TELEPHONE (OUTPATIENT)
Dept: UROLOGY | Age: 67
End: 2022-03-02

## 2022-03-02 LAB
CULTURE: NO GROWTH
SPECIMEN DESCRIPTION: NORMAL

## 2022-03-02 NOTE — TELEPHONE ENCOUNTER
----- Message from DARLEEN Dupont - CNP sent at 3/2/2022  2:44 PM EST -----  Call pt - urine cx reviewed and negative for UTI & for significant microhematuria

## 2022-03-24 ENCOUNTER — OFFICE VISIT (OUTPATIENT)
Dept: PRIMARY CARE CLINIC | Age: 67
End: 2022-03-24
Payer: COMMERCIAL

## 2022-03-24 VITALS
SYSTOLIC BLOOD PRESSURE: 124 MMHG | HEART RATE: 74 BPM | RESPIRATION RATE: 22 BRPM | TEMPERATURE: 97.7 F | WEIGHT: 305.8 LBS | OXYGEN SATURATION: 98 % | DIASTOLIC BLOOD PRESSURE: 78 MMHG | BODY MASS INDEX: 42.65 KG/M2

## 2022-03-24 DIAGNOSIS — J30.2 SEASONAL ALLERGIES: ICD-10-CM

## 2022-03-24 DIAGNOSIS — E78.5 DYSLIPIDEMIA: ICD-10-CM

## 2022-03-24 DIAGNOSIS — Z13.220 LIPID SCREENING: ICD-10-CM

## 2022-03-24 DIAGNOSIS — R73.9 ELEVATED BLOOD SUGAR: ICD-10-CM

## 2022-03-24 DIAGNOSIS — I10 ESSENTIAL HYPERTENSION: Primary | ICD-10-CM

## 2022-03-24 DIAGNOSIS — Z12.11 SCREEN FOR COLON CANCER: ICD-10-CM

## 2022-03-24 LAB — HBA1C MFR BLD: 6 %

## 2022-03-24 PROCEDURE — 83036 HEMOGLOBIN GLYCOSYLATED A1C: CPT | Performed by: NURSE PRACTITIONER

## 2022-03-24 PROCEDURE — 99214 OFFICE O/P EST MOD 30 MIN: CPT | Performed by: NURSE PRACTITIONER

## 2022-03-24 RX ORDER — FLUTICASONE PROPIONATE 50 MCG
2 SPRAY, SUSPENSION (ML) NASAL DAILY
Qty: 16 G | Refills: 5 | Status: SHIPPED | OUTPATIENT
Start: 2022-03-24

## 2022-03-24 RX ORDER — MONTELUKAST SODIUM 10 MG/1
10 TABLET ORAL DAILY
Qty: 90 TABLET | Refills: 0 | Status: SHIPPED | OUTPATIENT
Start: 2022-03-24 | End: 2022-06-15

## 2022-03-24 ASSESSMENT — ENCOUNTER SYMPTOMS
BLURRED VISION: 0
DIARRHEA: 0
RHINORRHEA: 1
ABDOMINAL PAIN: 0
CONSTIPATION: 0
SHORTNESS OF BREATH: 0
VOMITING: 0
SINUS PAIN: 0
NAUSEA: 0
ORTHOPNEA: 0
SORE THROAT: 1
WHEEZING: 0
SWOLLEN GLANDS: 0
COUGH: 1

## 2022-03-24 ASSESSMENT — PATIENT HEALTH QUESTIONNAIRE - PHQ9
2. FEELING DOWN, DEPRESSED OR HOPELESS: 0
SUM OF ALL RESPONSES TO PHQ QUESTIONS 1-9: 0
SUM OF ALL RESPONSES TO PHQ QUESTIONS 1-9: 0
SUM OF ALL RESPONSES TO PHQ9 QUESTIONS 1 & 2: 0
SUM OF ALL RESPONSES TO PHQ QUESTIONS 1-9: 0
SUM OF ALL RESPONSES TO PHQ QUESTIONS 1-9: 0
1. LITTLE INTEREST OR PLEASURE IN DOING THINGS: 0

## 2022-03-24 NOTE — PATIENT INSTRUCTIONS
SURVEY:     You may be receiving a survey from [employer] regarding your visit today. Please complete the survey to enable us to provide the highest quality of care to you and your family. If you cannot score us a very good on any question, please call the office to discuss how we could have made your experience a very good one. Thank you. Rich Nieves, APRN-CNP  Yenifergiovanna Dobbs, CNP  Frantz Funez, LPN  Monroe Phillips, LPN  Dar Cleaning, CMA  Lydia Omalley, CMA  Rhianna, CMA  Itzel, PCA  Patient Education        High Blood Pressure: Care Instructions  Overview     It's normal for blood pressure to go up and down throughout the day. But if it stays up, you have high blood pressure. Another name for high blood pressure is hypertension. Despite what a lot of people think, high blood pressure usually doesn't cause headaches or make you feel dizzy or lightheaded. It usually has no symptoms. But it does increase your risk of stroke, heart attack, and other problems. You and your doctor will talk about your risks of these problems based on your blood pressure. Your doctor will give you a goal for your blood pressure. Your goal will be based on your health and your age. Lifestyle changes, such as eating healthy and being active, are always important to help lower blood pressure. You might also take medicine to reach your blood pressure goal.  Follow-up care is a key part of your treatment and safety. Be sure to make and go to all appointments, and call your doctor if you are having problems. It's also a good idea to know your test results and keep a list of the medicines you take. How can you care for yourself at home? Medical treatment  · If you stop taking your medicine, your blood pressure will go back up. You may take one or more types of medicine to lower your blood pressure. Be safe with medicines. Take your medicine exactly as prescribed. Call your doctor if you think you are having a problem with your medicine.   · Talk to your doctor before you start taking aspirin every day. Aspirin can help certain people lower their risk of a heart attack or stroke. But taking aspirin isn't right for everyone, because it can cause serious bleeding. · See your doctor regularly. You may need to see the doctor more often at first or until your blood pressure comes down. · If you are taking blood pressure medicine, talk to your doctor before you take decongestants or anti-inflammatory medicine, such as ibuprofen. Some of these medicines can raise blood pressure. · Learn how to check your blood pressure at home. Lifestyle changes  · Stay at a healthy weight. This is especially important if you put on weight around the waist. Losing even 10 pounds can help you lower your blood pressure. · If your doctor recommends it, get more exercise. Walking is a good choice. Bit by bit, increase the amount you walk every day. Try for at least 30 minutes on most days of the week. You also may want to swim, bike, or do other activities. · Avoid or limit alcohol. Talk to your doctor about whether you can drink any alcohol. · Try to limit how much sodium you eat to less than 2,300 milligrams (mg) a day. Your doctor may ask you to try to eat less than 1,500 mg a day. · Eat plenty of fruits (such as bananas and oranges), vegetables, legumes, whole grains, and low-fat dairy products. · Lower the amount of saturated fat in your diet. Saturated fat is found in animal products such as milk, cheese, and meat. Limiting these foods may help you lose weight and also lower your risk for heart disease. · Do not smoke. Smoking increases your risk for heart attack and stroke. If you need help quitting, talk to your doctor about stop-smoking programs and medicines. These can increase your chances of quitting for good. When should you call for help? Call 911  anytime you think you may need emergency care.  This may mean having symptoms that suggest that your blood pressure is causing a serious heart or blood vessel problem. Your blood pressure may be over 180/120. For example, call 911 if:    · You have symptoms of a heart attack. These may include:  ? Chest pain or pressure, or a strange feeling in the chest.  ? Sweating. ? Shortness of breath. ? Nausea or vomiting. ? Pain, pressure, or a strange feeling in the back, neck, jaw, or upper belly or in one or both shoulders or arms. ? Lightheadedness or sudden weakness. ? A fast or irregular heartbeat.     · You have symptoms of a stroke. These may include:  ? Sudden numbness, tingling, weakness, or loss of movement in your face, arm, or leg, especially on only one side of your body. ? Sudden vision changes. ? Sudden trouble speaking. ? Sudden confusion or trouble understanding simple statements. ? Sudden problems with walking or balance. ? A sudden, severe headache that is different from past headaches.     · You have severe back or belly pain. Do not wait until your blood pressure comes down on its own. Get help right away. Call your doctor now or seek immediate care if:    · Your blood pressure is much higher than normal (such as 180/120 or higher), but you don't have symptoms.     · You think high blood pressure is causing symptoms, such as:  ? Severe headache.  ? Blurry vision. Watch closely for changes in your health, and be sure to contact your doctor if:    · Your blood pressure measures higher than your doctor recommends at least 2 times. That means the top number is higher or the bottom number is higher, or both.     · You think you may be having side effects from your blood pressure medicine. Where can you learn more? Go to [email or web address] org and sign in to your [employer] account. Enter D313 in the Redfin box to learn more about \"High Blood Pressure: Care Instructions. \"     If you do not have an account, please click on the \"Sign Up Now\" link.   Current as of: April 29, 2021               Content Version: 13.1  © 0086-8279 [employer]. Care instructions adapted under license by [facility] ([facility]). If you have questions about a medical condition or this instruction, always ask your healthcare professional. [address] any warranty or liability for your use of this information.

## 2022-03-24 NOTE — PROGRESS NOTES
Name: Tolu Jaffe  : 1955         Chief Complaint:     Chief Complaint   Patient presents with    Hypertension     routine check. No concerns       History of Present Illness:      Tolu Jaffe is a 61 y.o.  male who presents with Hypertension (routine check. No concerns)      Marcelo Garcia is here today for a routine office visit. Seasonal allergic rhinitispatient states he did start Singulair and felt some relief but then stopped taking. He now is having repeat symptoms especially with nasal congestion. See below for further comment. Hypertension  This is a chronic problem. The current episode started more than 1 year ago. The problem has been gradually worsening since onset. The problem is uncontrolled. Pertinent negatives include no anxiety, blurred vision, chest pain, headaches, malaise/fatigue, neck pain, orthopnea, palpitations, peripheral edema, PND, shortness of breath or sweats. There are no associated agents to hypertension. Risk factors for coronary artery disease include male gender, obesity, family history, dyslipidemia and stress. Past treatments include angiotensin blockers, diuretics, beta blockers and direct vasodilators. The current treatment provides mild improvement. Compliance problems include exercise and diet. There is no history of kidney disease, CAD/MI, CVA or heart failure. There is no history of chronic renal disease. Hyperlipidemia  This is a chronic problem. The current episode started more than 1 year ago. The problem is controlled. Recent lipid tests were reviewed and are normal. He has no history of chronic renal disease. Factors aggravating his hyperlipidemia include beta blockers. Pertinent negatives include no chest pain, focal weakness, myalgias or shortness of breath. Current antihyperlipidemic treatment includes statins. The current treatment provides moderate improvement of lipids. Compliance problems include adherence to exercise.   Risk factors for coronary artery disease include dyslipidemia, family history, obesity, male sex, hypertension and stress. URI   This is a chronic problem. The current episode started more than 1 year ago. The problem has been waxing and waning. There has been no fever. Associated symptoms include congestion, coughing, rhinorrhea, sneezing and a sore throat. Pertinent negatives include no abdominal pain, chest pain, diarrhea, dysuria, ear pain, headaches, joint pain, joint swelling, nausea, neck pain, plugged ear sensation, rash, sinus pain, swollen glands, vomiting or wheezing. He has tried nothing for the symptoms. The treatment provided no relief. Past Medical History:     Past Medical History:   Diagnosis Date    Acromegalia ([address]     Arthritis     H/O echocardiogram 09/13/2017    poor image quality. EF 60%. Moderate LV hypertrophy normal LV cavity size. Unable to assess specific wall motion abnormalities due to image quality. No significant valvular abnormalities. Mild diastolic dysfunction is seen.  History of stress test     25+ years ago. per patient it was normal    History of stress test 09/14/2017    Equivocal study. Moderate perfusion defect of moderate intensity in the inferolateral and inferior regions during stress and rest imaging. EF  71% without regional wall motion abnormalities. No significant electrocardiographic evidence of MI during EKG Vu Treadmill score 6,low risk for CAD.     Hyperlipidemia     Hypertension     Left retinal detachment     Sleep apnea     C-PAP    Thyroid disease     HYPOTHYROID    Ureteral stone     history      Reviewed all health maintenance requirements and ordered appropriate tests  Health Maintenance Due   Topic Date Due    Colorectal Cancer Screen  Never done    Lipid screen  10/02/2018       Past Surgical History:     Past Surgical History:   Procedure Laterality Date    CATARACT REMOVAL Right     CATARACT REMOVAL      CATARACT REMOVAL WITH tablet Take 81 mg by mouth daily   Yes Historical Provider, MD   Testosterone 1.62 % GEL Place 2 Pump onto the skin daily   Yes Historical Provider, MD   NONFORMULARY mylan/levothyrox 0.175 mg daily   Yes Historical Provider, MD   Ascorbic Acid (VITAMIN C) 500 MG CAPS Take by mouth daily   Yes Historical Provider, MD   Omega-3 Fatty Acids (FISH OIL PO) Take 1 capsule by mouth 3 times daily Indications: LAST DOSE 2/27/2020    Yes Historical Provider, MD   Hydrocort-Pramoxine, Perianal, (PROCTOFOAM HC) 1-1 % rectal foam Apply to rectal area 2 times daily as needed  Patient not taking: Reported on 3/24/2022 9/24/21   Nargis Rumps Might, APRN - CNP   polyethylene glycol (GLYCOLAX) packet Take 17 g by mouth nightly    Historical Provider, MD        Allergies:       Patient has no known allergies. Social History:     Tobacco:    reports that he has never smoked. He has never used smokeless tobacco.  Alcohol:      reports current alcohol use. Drug Use:  reports no history of drug use. Family History:     Family History   Problem Relation Age of Onset    Diabetes Mother     Alzheimer's Disease Mother        Review of Systems:     Positive and Negative as described in HPI    Review of Systems   Constitutional: Negative for chills, fatigue, fever and malaise/fatigue. HENT: Positive for congestion, rhinorrhea, sneezing and sore throat. Negative for ear pain and sinus pain. Eyes: Negative for blurred vision and visual disturbance. Respiratory: Positive for cough. Negative for shortness of breath and wheezing. Cardiovascular: Negative for chest pain, palpitations, orthopnea and PND. Gastrointestinal: Negative for abdominal pain, constipation, diarrhea, nausea and vomiting. Genitourinary: Negative for difficulty urinating and dysuria. Musculoskeletal: Negative for gait problem, joint pain, myalgias, neck pain and neck stiffness. Skin: Negative for rash.    Neurological: Negative for dizziness, focal weakness, syncope, light-headedness and headaches. Physical Exam:   Vitals:  /78 (Position: Sitting)   Pulse 74   Temp 97.7 °F (36.5 °C) (Temporal)   Resp 22   Wt (!) 305 lb 12.8 oz (138.7 kg)   SpO2 98%   BMI 42.65 kg/m²     Physical Exam  Vitals and nursing note reviewed. Constitutional:       General: He is not in acute distress. Appearance: Normal appearance. He is well-developed. He is obese. He is not ill-appearing. HENT:      Nose: Mucosal edema and congestion present. Right Turbinates: Swollen and pale. Left Turbinates: Swollen and pale. Mouth/Throat:      Mouth: Mucous membranes are moist. Mucous membranes are not dry. Pharynx: Posterior oropharyngeal erythema present. Eyes:      General: No scleral icterus. Conjunctiva/sclera: Conjunctivae normal.   Neck:      Vascular: No carotid bruit. Cardiovascular:      Rate and Rhythm: Normal rate and regular rhythm. Heart sounds: No murmur heard. Pulmonary:      Effort: Pulmonary effort is normal.      Breath sounds: Normal breath sounds. No wheezing or rales. Abdominal:      General: Bowel sounds are normal. There is no distension. Palpations: Abdomen is soft. Tenderness: There is no abdominal tenderness. Musculoskeletal:      Cervical back: Normal range of motion and neck supple. Lymphadenopathy:      Cervical: No cervical adenopathy. Skin:     General: Skin is warm and dry. Findings: No rash. Neurological:      Mental Status: He is alert and oriented to person, place, and time.    Psychiatric:         Mood and Affect: Mood normal.         Behavior: Behavior normal.         Data:     Lab Results   Component Value Date     08/18/2021    K 3.4 08/18/2021     08/18/2021    CO2 27 08/18/2021    BUN 16 08/18/2021    CREATININE 0.91 08/18/2021    GLUCOSE 114 08/18/2021    PROT 7.0 08/18/2021    LABALBU 4.2 08/18/2021    BILITOT 0.50 08/18/2021    ALKPHOS 72 08/18/2021    AST 31 2021    ALT 39 2021     Lab Results   Component Value Date    WBC 5.7 2021    RBC 4.93 2021    HGB 14.0 2021    HCT 42.0 2021    MCV 85.2 2021    MCH 28.4 2021    MCHC 33.3 2021    RDW 12.9 2021     2021    MPV 8.9 2021     Lab Results   Component Value Date    TSH 0.65 2017     Lab Results   Component Value Date    CHOL 115 10/02/2017    HDL 37 10/02/2017    PSA 0.10 2022    LABA1C 6.0 2022       Assessment/Plan:      Diagnosis Orders   1. Essential hypertension  ALT    CBC with Auto Differential    AST    Basic Metabolic Panel   2. Dyslipidemia     3. Seasonal allergies  montelukast (SINGULAIR) 10 MG tablet    fluticasone (FLONASE) 50 MCG/ACT nasal spray   4. Screen for colon cancer  POCT Fecal Immunochemical Test (FIT)   5. Elevated blood sugar  POCT glycosylated hemoglobin (Hb A1C)   6. Lipid screening  Lipid Panel     We will continue all current medications. Restart Singulair and add fluticasone nasal spray daily. We will see him back in 6 months with labs, sooner if any issues. 1.  Soraida Bess received counseling on the following healthy behaviors: nutrition, exercise and medication adherence  2. Patient given educational materials - see patient instructions  3. Was a self-tracking handout given in paper form or via [employer]? No  If yes, see orders or list here. 4.  Discussed use, benefit, and side effects of prescribed medications. Barriers to medication compliance addressed. All patient questions answered. Pt voiced understanding. 5.  Reviewed prior labs and health maintenance  6. Continue current medications, diet and exercise.     Completed Refills   Requested Prescriptions     Signed Prescriptions Disp Refills    montelukast (SINGULAIR) 10 MG tablet 90 tablet 0     Sig: Take 1 tablet by mouth daily    fluticasone (FLONASE) 50 MCG/ACT nasal spray 16 g 5     Si sprays by Each Nostril route daily Return in about 6 months (around 9/24/2022) for Check up.

## 2022-04-05 ENCOUNTER — OFFICE VISIT (OUTPATIENT)
Dept: UROLOGY | Age: 67
End: 2022-04-05
Payer: COMMERCIAL

## 2022-04-05 VITALS
HEIGHT: 71 IN | BODY MASS INDEX: 43.03 KG/M2 | TEMPERATURE: 97.3 F | WEIGHT: 307.4 LBS | RESPIRATION RATE: 18 BRPM | SYSTOLIC BLOOD PRESSURE: 123 MMHG | HEART RATE: 50 BPM | DIASTOLIC BLOOD PRESSURE: 85 MMHG

## 2022-04-05 DIAGNOSIS — N13.8 BPH WITH OBSTRUCTION/LOWER URINARY TRACT SYMPTOMS: Primary | ICD-10-CM

## 2022-04-05 DIAGNOSIS — N40.1 BPH WITH OBSTRUCTION/LOWER URINARY TRACT SYMPTOMS: Primary | ICD-10-CM

## 2022-04-05 PROCEDURE — 99213 OFFICE O/P EST LOW 20 MIN: CPT | Performed by: NURSE PRACTITIONER

## 2022-04-05 RX ORDER — TAMSULOSIN HYDROCHLORIDE 0.4 MG/1
0.4 CAPSULE ORAL DAILY
Qty: 90 CAPSULE | Refills: 3 | Status: SHIPPED | OUTPATIENT
Start: 2022-04-05 | End: 2022-05-16 | Stop reason: SDUPTHER

## 2022-04-05 ASSESSMENT — ENCOUNTER SYMPTOMS
COUGH: 0
BACK PAIN: 0
SHORTNESS OF BREATH: 0
ABDOMINAL PAIN: 0
VOMITING: 0
WHEEZING: 0
EYE REDNESS: 0
COLOR CHANGE: 0
NAUSEA: 0
CONSTIPATION: 0

## 2022-04-05 NOTE — PATIENT INSTRUCTIONS
SURVEY:    You may be receiving a survey from [employer] regarding your visit today. Please complete the survey to enable us to provide the highest quality of care to you and your family. If you cannot score us a very good on any question, please call the office to discuss how we could have made your experience a very good one. Thank you.

## 2022-04-05 NOTE — PROGRESS NOTES
HPI:          Patient is a 61 y.o. male in no acute distress. He is alert and oriented to person, place, and time. History  Acromegalia - follows with endocrinology    3/2017 right HLL for 8mm ureteral stone    11/2020 Right ESWL for a 7mm right renal stone    3/2022 urgency and intermittent urge incontinence   Flomax  Today  Here today to follow-up for BPH. At his last visit he was complaining of urgency and urge incontinence. We did start Flomax daily. He is urinating every 3 hours during the day. He has nocturia x1. He is no longer having urgency or urge incontinence. He is very happy with his symptom relief. He continues to have daily bowel movements with MiraLAX daily. Past Medical History:   Diagnosis Date    Acromegalia ([address]     Arthritis     H/O echocardiogram 09/13/2017    poor image quality. EF 60%. Moderate LV hypertrophy normal LV cavity size. Unable to assess specific wall motion abnormalities due to image quality. No significant valvular abnormalities. Mild diastolic dysfunction is seen.  History of stress test     25+ years ago. per patient it was normal    History of stress test 09/14/2017    Equivocal study. Moderate perfusion defect of moderate intensity in the inferolateral and inferior regions during stress and rest imaging. EF  71% without regional wall motion abnormalities. No significant electrocardiographic evidence of MI during EKG Vu Treadmill score 6,low risk for CAD.     Hyperlipidemia     Hypertension     Left retinal detachment     Sleep apnea     C-PAP    Thyroid disease     HYPOTHYROID    Ureteral stone     history     Past Surgical History:   Procedure Laterality Date    CATARACT REMOVAL Right     CATARACT REMOVAL      CATARACT REMOVAL WITH IMPLANT Left 07/13/2020    [facility]    COLONOSCOPY  2014    CYSTOSCOPY Right 03/10/2017    EYE SURGERY      HIP ARTHROPLASTY Right     KOLE    INTRACAPSULAR CATARACT EXTRACTION Left 7/13/2020    EYE CATARACT EMULSIFICATION IOL IMPLANT performed by Brent Wing DO at [facility] LITHOTRIPSY Right 11/24/2020    ESWL EXTRACORPOREAL SHOCK WAVE LITHOTRIPSY (Right )     LITHOTRIPSY Right 11/24/2020    ESWL EXTRACORPOREAL SHOCK WAVE LITHOTRIPSY performed by Leo Acosta MD at [facility] TUMOR REMOVAL    SHOULDER ARTHROPLASTY Bilateral     SHOULDER SURGERY      TOTAL KNEE ARTHROPLASTY Bilateral     VASCULAR SURGERY      VITRECTOMY Left 03/04/2020    VITRECTOMY Left 3/4/2020    VITRECTOMY 25 GAUGE, AIR FLUID EXCHANGE, ENDOLASER 200MW 200MS 607  SPOTS , AIR GAS EXCHANGE WITH 20 % SF6 performed by Gloria Warren MD at [facility] Encounter Medications as of 4/5/2022   Medication Sig Dispense Refill    tamsulosin (FLOMAX) 0.4 MG capsule Take 1 capsule by mouth daily 90 capsule 3    montelukast (SINGULAIR) 10 MG tablet Take 1 tablet by mouth daily 90 tablet 0    fluticasone (FLONASE) 50 MCG/ACT nasal spray 2 sprays by Each Nostril route daily 16 g 5    ibuprofen (ADVIL;MOTRIN) 600 MG tablet       valsartan-hydroCHLOROthiazide (DIOVAN-HCT) 320-12.5 MG per tablet Take 1 tablet by mouth daily 90 tablet 3    lovastatin (MEVACOR) 40 MG tablet Take 1 tablet by mouth daily 90 tablet 3    hydrALAZINE (APRESOLINE) 50 MG tablet Take 1 tablet by mouth 3 times daily 270 tablet 3    metoprolol tartrate (LOPRESSOR) 25 MG tablet Take 1 tablet by mouth daily 180 tablet 3    aspirin 81 MG EC tablet Take 81 mg by mouth daily      Testosterone 1.62 % GEL Place 2 Pump onto the skin daily      polyethylene glycol (GLYCOLAX) packet Take 17 g by mouth nightly      NONFORMULARY mylan/levothyrox 0.175 mg daily      Ascorbic Acid (VITAMIN C) 500 MG CAPS Take by mouth daily      Omega-3 Fatty Acids (FISH OIL PO) Take 1 capsule by mouth 3 times daily Indications: LAST DOSE 2/27/2020       [DISCONTINUED] tamsulosin (FLOMAX) 0.4 MG capsule Take 1 capsule by mouth daily 30 capsule 1    [DISCONTINUED] Hydrocort-Pramoxine, Perianal, (PROCTOFOAM HC) 1-1 % rectal foam Apply to rectal area 2 times daily as needed (Patient not taking: Reported on 3/24/2022) 10 g 1     No facility-administered encounter medications on file as of 4/5/2022. Current Outpatient Medications on File Prior to Visit   Medication Sig Dispense Refill    montelukast (SINGULAIR) 10 MG tablet Take 1 tablet by mouth daily 90 tablet 0    fluticasone (FLONASE) 50 MCG/ACT nasal spray 2 sprays by Each Nostril route daily 16 g 5    ibuprofen (ADVIL;MOTRIN) 600 MG tablet       valsartan-hydroCHLOROthiazide (DIOVAN-HCT) 320-12.5 MG per tablet Take 1 tablet by mouth daily 90 tablet 3    lovastatin (MEVACOR) 40 MG tablet Take 1 tablet by mouth daily 90 tablet 3    hydrALAZINE (APRESOLINE) 50 MG tablet Take 1 tablet by mouth 3 times daily 270 tablet 3    metoprolol tartrate (LOPRESSOR) 25 MG tablet Take 1 tablet by mouth daily 180 tablet 3    aspirin 81 MG EC tablet Take 81 mg by mouth daily      Testosterone 1.62 % GEL Place 2 Pump onto the skin daily      polyethylene glycol (GLYCOLAX) packet Take 17 g by mouth nightly      NONFORMULARY mylan/levothyrox 0.175 mg daily      Ascorbic Acid (VITAMIN C) 500 MG CAPS Take by mouth daily      Omega-3 Fatty Acids (FISH OIL PO) Take 1 capsule by mouth 3 times daily Indications: LAST DOSE 2/27/2020        No current facility-administered medications on file prior to visit. Patient has no known allergies. Family History   Problem Relation Age of Onset    Diabetes Mother     Alzheimer's Disease Mother      Social History     Tobacco Use   Smoking Status Never Smoker   Smokeless Tobacco Never Used       Social History     Substance and Sexual Activity   Alcohol Use Yes    Comment: occ       Review of Systems   Constitutional: Negative for appetite change, chills and fever. Eyes: Negative for redness and visual disturbance. Respiratory: Negative for cough, shortness of breath and wheezing. Cardiovascular: Negative for chest pain and leg swelling. Gastrointestinal: Negative for abdominal pain, constipation, nausea and vomiting. Genitourinary: Negative for decreased urine volume, difficulty urinating, dysuria, enuresis, flank pain, frequency, hematuria, penile discharge, penile pain, scrotal swelling, testicular pain and urgency. Musculoskeletal: Negative for back pain, joint swelling and myalgias. Skin: Negative for color change, rash and wound. Neurological: Negative for dizziness, tremors and numbness. Hematological: Negative for adenopathy. Does not bruise/bleed easily. /85 (Site: Right Upper Arm, Position: Sitting, Cuff Size: Large Adult)   Pulse 50   Temp 97.3 °F (36.3 °C) (Temporal)   Resp 18   Ht 5' 11\" (1.803 m)   Wt (!) 307 lb 6.4 oz (139.4 kg)   BMI 42.87 kg/m²       PHYSICAL EXAM:  Constitutional: Patient in no acute distress; Neuro: alert and oriented to person place and time. Psych: Mood and affect normal.  Skin: Normal  Lungs: Respiratory effort normal  Cardiovascular:  Normal peripheral pulses  Abdomen: Soft, non-tender, non-distended with no CVA, flank pain  Bladder non-tender and not distended. Lab Results   Component Value Date    BUN 16 08/18/2021     Lab Results   Component Value Date    CREATININE 0.91 08/18/2021     Lab Results   Component Value Date    PSA 0.10 02/28/2022    PSA 0.18 02/25/2021    PSA 0.59 05/23/2019       ASSESSMENT:   Diagnosis Orders   1.  BPH with obstruction/lower urinary tract symptoms           PLAN:  Continue Flomax daily    Continue miralax daily    Follow-up in 1 year with a KUB prior or sooner if needed for new or worsening symptoms

## 2022-04-11 DIAGNOSIS — Z12.11 SCREEN FOR COLON CANCER: ICD-10-CM

## 2022-04-11 LAB
CONTROL: PRESENT
HEMOCCULT STL QL: NEGATIVE

## 2022-04-11 PROCEDURE — 82274 ASSAY TEST FOR BLOOD FECAL: CPT | Performed by: NURSE PRACTITIONER

## 2022-04-12 ENCOUNTER — TELEPHONE (OUTPATIENT)
Dept: PRIMARY CARE CLINIC | Age: 67
End: 2022-04-12

## 2022-04-12 NOTE — TELEPHONE ENCOUNTER
----- Message from DARLEEN Gan CNP sent at 4/11/2022  5:08 PM EDT -----  Results are normal, please call patient and make them aware.

## 2022-05-16 ENCOUNTER — TELEPHONE (OUTPATIENT)
Dept: UROLOGY | Age: 67
End: 2022-05-16

## 2022-05-16 RX ORDER — TAMSULOSIN HYDROCHLORIDE 0.4 MG/1
0.4 CAPSULE ORAL DAILY
Qty: 90 CAPSULE | Refills: 3 | Status: SHIPPED | OUTPATIENT
Start: 2022-05-16

## 2022-05-16 NOTE — TELEPHONE ENCOUNTER
Wife called and patient needs 90 day supply of Tamsulosin sent to [facility] mail order.

## 2022-06-15 DIAGNOSIS — J30.2 SEASONAL ALLERGIES: ICD-10-CM

## 2022-06-15 RX ORDER — MONTELUKAST SODIUM 10 MG/1
TABLET ORAL
Qty: 90 TABLET | Refills: 3 | Status: SHIPPED | OUTPATIENT
Start: 2022-06-15

## 2022-06-15 NOTE — TELEPHONE ENCOUNTER
Health Maintenance   Topic Date Due    Lipids  10/27/2018    DTaP/Tdap/Td vaccine (1 - Tdap) 09/24/2022 (Originally 9/9/1974)    Hepatitis C screen  09/24/2022 (Originally [birth year]1975)    HIV screen  09/24/2022 (Originally [birth year]9/1972)    Prostate Specific Antigen (PSA) Screening or Monitoring  02/28/2023    A1C test (Diabetic or Prediabetic)  03/24/2023    Depression Screen  03/24/2023    Colorectal Cancer Screen  04/11/2023    Flu vaccine  Completed    Shingles vaccine  Completed    COVID-19 Vaccine  Completed    Hepatitis A vaccine  Aged Out    Hepatitis B vaccine  Aged Out    Hib vaccine  Aged Out    Meningococcal (ACWY) vaccine  Aged Out    Pneumococcal 0-64 years Vaccine  Aged Out             (applicable per patient's age: Cancer Screenings, Depression Screening, Fall Risk Screening, Immunizations)    Hemoglobin A1C (%)   Date Value   03/24/2022 6.0   10/02/2017 5.8     LDL Calculated (mg/dL)   Date Value   10/02/2017 51     AST (U/L)   Date Value   08/18/2021 31     ALT (U/L)   Date Value   08/18/2021 39     BUN (mg/dL)   Date Value   08/18/2021 16      (goal A1C is < 7)   (goal LDL is <100) need 30-50% reduction from baseline     BP Readings from Last 3 Encounters:   04/05/22 123/85   03/24/22 124/78   03/01/22 125/88    (goal /80)      All Future Testing planned in [facility]:  Lab Frequency Next Occurrence   PSA Screening Once 03/01/2023   XR ABDOMEN (KUB) (SINGLE AP VIEW) Once 03/01/2023   ALT Once 09/24/2022   CBC with Auto Differential Once 09/20/2022   AST Once 47/11/8787   Basic Metabolic Panel Once 22/58/2262   Lipid Panel Once 09/20/2022       Next Visit Date:  Future Appointments   Date Time Provider Ebonie Beverly   9/28/2022 11:00 AM Cheryle Huh Might, APRN - CNP [facility]   3/2/2023  9:30 AM Aristeo Rudd APRN - CNP [facility]            Patient Active Problem List:     Ureteral calculus     Retinal detachment of left eye with multiple breaks     Renal stones Acromegaly ([facility] [address]     Essential hypertension     Hypopituitarism ([facility] [address]     Sleep apnea     BPH with obstruction/lower urinary tract symptoms     Dyslipidemia     Seasonal allergies

## 2023-02-21 ENCOUNTER — HOSPITAL ENCOUNTER (OUTPATIENT)
Age: 68
Discharge: HOME | End: 2023-02-21
Payer: COMMERCIAL

## 2023-02-21 ENCOUNTER — HOSPITAL ENCOUNTER (OUTPATIENT)
Dept: GENERAL RADIOLOGY | Age: 68
Discharge: HOME | End: 2023-02-23
Payer: COMMERCIAL

## 2023-02-21 ENCOUNTER — HOSPITAL ENCOUNTER (OUTPATIENT)
Age: 68
Discharge: HOME | End: 2023-02-23
Payer: COMMERCIAL

## 2023-02-21 DIAGNOSIS — Z13.220 LIPID SCREENING: ICD-10-CM

## 2023-02-21 DIAGNOSIS — I10 ESSENTIAL HYPERTENSION: ICD-10-CM

## 2023-02-21 DIAGNOSIS — Z12.5 SCREENING PSA (PROSTATE SPECIFIC ANTIGEN): ICD-10-CM

## 2023-02-21 DIAGNOSIS — N20.0 RENAL STONES: ICD-10-CM

## 2023-02-21 LAB
ABSOLUTE EOS #: 0.16 K/UL (ref 0–0.44)
ABSOLUTE IMMATURE GRANULOCYTE: <0.03 K/UL (ref 0–0.3)
ABSOLUTE LYMPH #: 1.42 K/UL (ref 1.1–3.7)
ABSOLUTE MONO #: 0.35 K/UL (ref 0.1–1.2)
ALT SERPL-CCNC: 40 U/L (ref 5–41)
ANION GAP SERPL CALCULATED.3IONS-SCNC: 9 MMOL/L (ref 9–17)
AST SERPL-CCNC: 30 U/L
BASOPHILS # BLD: 0 % (ref 0–2)
BASOPHILS ABSOLUTE: <0.03 K/UL (ref 0–0.2)
BUN SERPL-MCNC: 21 MG/DL (ref 8–23)
BUN/CREAT BLD: 18 (ref 9–20)
CALCIUM SERPL-MCNC: 9.2 MG/DL (ref 8.6–10.4)
CHLORIDE SERPL-SCNC: 103 MMOL/L (ref 98–107)
CO2 SERPL-SCNC: 31 MMOL/L (ref 20–31)
CREAT SERPL-MCNC: 1.19 MG/DL (ref 0.7–1.2)
EOSINOPHILS RELATIVE PERCENT: 3 % (ref 1–4)
GFR SERPL CREATININE-BSD FRML MDRD: >60 ML/MIN/1.73M2
GLUCOSE SERPL-MCNC: 120 MG/DL (ref 70–99)
HCT VFR BLD AUTO: 41.3 % (ref 40.7–50.3)
HGB BLD-MCNC: 14 G/DL (ref 13–17)
IMMATURE GRANULOCYTES: 0 %
LYMPHOCYTES # BLD: 27 % (ref 24–43)
MCH RBC QN AUTO: 30.2 PG (ref 25.2–33.5)
MCHC RBC AUTO-ENTMCNC: 33.9 G/DL (ref 28.4–34.8)
MCV RBC AUTO: 89 FL (ref 82.6–102.9)
MONOCYTES # BLD: 7 % (ref 3–12)
NRBC AUTOMATED: 0 PER 100 WBC
PDW BLD-RTO: 12.7 % (ref 11.8–14.4)
PLATELET # BLD AUTO: 153 K/UL (ref 138–453)
PMV BLD AUTO: 9.3 FL (ref 8.1–13.5)
POTASSIUM SERPL-SCNC: 3.1 MMOL/L (ref 3.7–5.3)
PROSTATE SPECIFIC ANTIGEN: <0.02 NG/ML
RBC # BLD: 4.64 M/UL (ref 4.21–5.77)
SEG NEUTROPHILS: 63 % (ref 36–65)
SEGMENTED NEUTROPHILS ABSOLUTE COUNT: 3.33 K/UL (ref 1.5–8.1)
SODIUM SERPL-SCNC: 143 MMOL/L (ref 135–144)
WBC # BLD AUTO: 5.3 K/UL (ref 3.5–11.3)

## 2023-02-21 PROCEDURE — 84450 TRANSFERASE (AST) (SGOT): CPT

## 2023-02-21 PROCEDURE — 80061 LIPID PANEL: CPT

## 2023-02-21 PROCEDURE — 80048 BASIC METABOLIC PNL TOTAL CA: CPT

## 2023-02-21 PROCEDURE — 85025 COMPLETE CBC W/AUTO DIFF WBC: CPT

## 2023-02-21 PROCEDURE — 36415 COLL VENOUS BLD VENIPUNCTURE: CPT

## 2023-02-21 PROCEDURE — G0103 PSA SCREENING: HCPCS

## 2023-02-21 PROCEDURE — 84460 ALANINE AMINO (ALT) (SGPT): CPT

## 2023-02-21 PROCEDURE — 74018 RADEX ABDOMEN 1 VIEW: CPT

## 2023-02-22 ENCOUNTER — TELEPHONE (OUTPATIENT)
Dept: PRIMARY CARE CLINIC | Age: 68
End: 2023-02-22

## 2023-02-22 DIAGNOSIS — E87.6 HYPOKALEMIA: Primary | ICD-10-CM

## 2023-02-22 LAB
CHOLEST SERPL-MCNC: 126 MG/DL
CHOLESTEROL/HDL RATIO: 4.1
HDLC SERPL-MCNC: 31 MG/DL
LDLC SERPL CALC-MCNC: 71 MG/DL (ref 0–130)
TRIGL SERPL-MCNC: 121 MG/DL

## 2023-02-22 RX ORDER — LEVOTHYROXINE SODIUM 175 UG/1
TABLET ORAL
COMMUNITY
Start: 2023-02-20

## 2023-02-22 RX ORDER — POTASSIUM CHLORIDE 20 MEQ/1
20 TABLET, EXTENDED RELEASE ORAL 2 TIMES DAILY
Qty: 180 TABLET | Refills: 1 | Status: SHIPPED | OUTPATIENT
Start: 2023-02-22

## 2023-02-22 NOTE — TELEPHONE ENCOUNTER
----- Message from [address] [place] [address], APRN - CNP sent at 2/22/2023  7:25 AM EST -----  Potassium is too low. This is probably from his blood pressure medication. He needs to start potassium twice daily, Rx sent to pharmacy. Have him repeat BMP in 1 week. Thank you.

## 2023-02-22 NOTE — TELEPHONE ENCOUNTER
----- Message from [address] [place] [address], APRN - CNP sent at 2/22/2023  7:25 AM EST -----  Potassium is too low. This is probably from his blood pressure medication. He needs to start potassium twice daily, Rx sent to pharmacy. Have him repeat BMP in 1 week. Thank you.

## 2023-03-02 ENCOUNTER — HOSPITAL ENCOUNTER (OUTPATIENT)
Age: 68
Discharge: HOME | End: 2023-03-02
Payer: COMMERCIAL

## 2023-03-02 ENCOUNTER — HOSPITAL ENCOUNTER (OUTPATIENT)
Age: 68
Setting detail: SPECIMEN
Discharge: HOME | End: 2023-03-02

## 2023-03-02 ENCOUNTER — TELEPHONE (OUTPATIENT)
Dept: PRIMARY CARE CLINIC | Age: 68
End: 2023-03-02

## 2023-03-02 ENCOUNTER — OFFICE VISIT (OUTPATIENT)
Dept: UROLOGY | Age: 68
End: 2023-03-02

## 2023-03-02 VITALS
HEART RATE: 83 BPM | TEMPERATURE: 96.9 F | SYSTOLIC BLOOD PRESSURE: 96 MMHG | DIASTOLIC BLOOD PRESSURE: 70 MMHG | WEIGHT: 303 LBS | HEIGHT: 71 IN | BODY MASS INDEX: 42.42 KG/M2

## 2023-03-02 DIAGNOSIS — R39.15 URGENCY OF URINATION: ICD-10-CM

## 2023-03-02 DIAGNOSIS — N13.8 BPH WITH OBSTRUCTION/LOWER URINARY TRACT SYMPTOMS: Primary | ICD-10-CM

## 2023-03-02 DIAGNOSIS — N13.8 BPH WITH OBSTRUCTION/LOWER URINARY TRACT SYMPTOMS: ICD-10-CM

## 2023-03-02 DIAGNOSIS — N40.1 BPH WITH OBSTRUCTION/LOWER URINARY TRACT SYMPTOMS: Primary | ICD-10-CM

## 2023-03-02 DIAGNOSIS — E87.6 HYPOKALEMIA: ICD-10-CM

## 2023-03-02 DIAGNOSIS — N20.0 RENAL STONES: ICD-10-CM

## 2023-03-02 DIAGNOSIS — N39.41 URGE INCONTINENCE: ICD-10-CM

## 2023-03-02 DIAGNOSIS — N40.1 BPH WITH OBSTRUCTION/LOWER URINARY TRACT SYMPTOMS: ICD-10-CM

## 2023-03-02 DIAGNOSIS — Z12.5 SCREENING PSA (PROSTATE SPECIFIC ANTIGEN): ICD-10-CM

## 2023-03-02 LAB
ANION GAP SERPL CALCULATED.3IONS-SCNC: 11 MMOL/L (ref 9–17)
BILIRUBIN URINE: NEGATIVE
BUN SERPL-MCNC: 18 MG/DL (ref 8–23)
BUN/CREAT BLD: 15 (ref 9–20)
CALCIUM SERPL-MCNC: 9.8 MG/DL (ref 8.6–10.4)
CHLORIDE SERPL-SCNC: 102 MMOL/L (ref 98–107)
CO2 SERPL-SCNC: 28 MMOL/L (ref 20–31)
COLOR: YELLOW
CREAT SERPL-MCNC: 1.17 MG/DL (ref 0.7–1.2)
EPITHELIAL CELLS UA: ABNORMAL /HPF (ref 0–5)
GFR SERPL CREATININE-BSD FRML MDRD: >60 ML/MIN/1.73M2
GLUCOSE SERPL-MCNC: 144 MG/DL (ref 70–99)
GLUCOSE UR STRIP.AUTO-MCNC: NEGATIVE MG/DL
KETONES UR STRIP.AUTO-MCNC: NEGATIVE MG/DL
LEUKOCYTE ESTERASE UR QL STRIP.AUTO: NEGATIVE
NITRITE UR QL STRIP.AUTO: NEGATIVE
POTASSIUM SERPL-SCNC: 3.6 MMOL/L (ref 3.7–5.3)
PROT UR STRIP.AUTO-MCNC: 6 MG/DL (ref 5–9)
PROT UR STRIP.AUTO-MCNC: NEGATIVE MG/DL
RBC CLUMPS #/AREA URNS AUTO: ABNORMAL /HPF (ref 0–2)
SODIUM SERPL-SCNC: 141 MMOL/L (ref 135–144)
SPECIFIC GRAVITY UA: 1.02 (ref 1.01–1.02)
TURBIDITY: CLEAR
URINE HGB: NEGATIVE
UROBILINOGEN, URINE: NORMAL
WBC UA: ABNORMAL /HPF (ref 0–5)

## 2023-03-02 PROCEDURE — 36415 COLL VENOUS BLD VENIPUNCTURE: CPT

## 2023-03-02 PROCEDURE — 80048 BASIC METABOLIC PNL TOTAL CA: CPT

## 2023-03-02 PROCEDURE — 81001 URINALYSIS AUTO W/SCOPE: CPT

## 2023-03-02 PROCEDURE — 87086 URINE CULTURE/COLONY COUNT: CPT

## 2023-03-02 RX ORDER — TAMSULOSIN HYDROCHLORIDE 0.4 MG/1
0.4 CAPSULE ORAL 2 TIMES DAILY
Qty: 60 CAPSULE | Refills: 3 | Status: SHIPPED | OUTPATIENT
Start: 2023-03-02

## 2023-03-02 ASSESSMENT — ENCOUNTER SYMPTOMS
SHORTNESS OF BREATH: 0
EYE REDNESS: 0
CONSTIPATION: 0
VOMITING: 0
ABDOMINAL PAIN: 0
NAUSEA: 0
COUGH: 0
COLOR CHANGE: 0
WHEEZING: 0
BACK PAIN: 0

## 2023-03-02 NOTE — TELEPHONE ENCOUNTER
----- Message from [facility], APRN - CNP sent at 3/2/2023 12:47 PM EST -----  Potassium level has improved. Continue supplement. Thank you.

## 2023-03-02 NOTE — PROGRESS NOTES
HPI:          Patient is a 74 y.o. male in no acute distress. He is alert and oriented to person, place, and time. History  Acromegalia - follows with endocrinology    3/2017 right HLL for 8mm ureteral stone    11/2020 Right ESWL for a 7mm right renal stone    3/2022 urgency and intermittent urge incontinence   Flomax  Today  Here today to follow-up for BPH, stones, and prostate cancer screening. He is complaining of urgency and urge incontinence. Last year after we started Flomax daily these symptoms resolved. He is urinating every 2-3 hours during the day. He has nocturia x1. PVR is low. He continues to have daily bowel movements with MiraLAX daily. PSA is <0.02. He did have a KUB completed prior to today's visit. This film was independently reviewed and does show bilateral renal stones. Past Medical History:   Diagnosis Date    Acromegalia ([address]     Arthritis     H/O echocardiogram 09/13/2017    poor image quality. EF 60%. Moderate LV hypertrophy normal LV cavity size. Unable to assess specific wall motion abnormalities due to image quality. No significant valvular abnormalities. Mild diastolic dysfunction is seen. History of stress test     25+ years ago. per patient it was normal    History of stress test 09/14/2017    Equivocal study. Moderate perfusion defect of moderate intensity in the inferolateral and inferior regions during stress and rest imaging. EF  71% without regional wall motion abnormalities. No significant electrocardiographic evidence of MI during EKG Vu Treadmill score 6,low risk for CAD.     Hyperlipidemia     Hypertension     Left retinal detachment     Sleep apnea     C-PAP    Thyroid disease     HYPOTHYROID    Ureteral stone     history     Past Surgical History:   Procedure Laterality Date    CATARACT REMOVAL Right     CATARACT REMOVAL      CATARACT REMOVAL WITH IMPLANT Left 07/13/2020    MHT/    COLONOSCOPY  2014    CYSTOSCOPY Right 03/10/2017 EYE SURGERY      HIP ARTHROPLASTY Right     KOLE    INTRACAPSULAR CATARACT EXTRACTION Left 7/13/2020    EYE CATARACT EMULSIFICATION IOL IMPLANT performed by Aric Licona DO at [address] [place] [address]      LITHOTRIPSY Right 11/24/2020    ESWL EXTRACORPOREAL SHOCK WAVE LITHOTRIPSY (Right )     LITHOTRIPSY Right 11/24/2020    ESWL EXTRACORPOREAL SHOCK WAVE LITHOTRIPSY performed by Francoise Powers MD at [place] [address] ARTHROPLASTY Bilateral     SHOULDER SURGERY      TOTAL KNEE ARTHROPLASTY Bilateral     VASCULAR SURGERY      VITRECTOMY Left 03/04/2020    VITRECTOMY Left 3/4/2020    VITRECTOMY 25 GAUGE, AIR FLUID EXCHANGE, ENDOLASER 200MW 200MS 607  SPOTS , AIR GAS EXCHANGE WITH 20 % SF6 performed by Payton Patel MD at [facility] Encounter Medications as of 3/2/2023   Medication Sig Dispense Refill    tamsulosin (FLOMAX) 0.4 MG capsule Take 1 capsule by mouth in the morning and at bedtime 60 capsule 3    levothyroxine (SYNTHROID) 175 MCG tablet       potassium chloride (KLOR-CON M) 20 MEQ extended release tablet Take 1 tablet by mouth 2 times daily 180 tablet 1    ibuprofen (ADVIL;MOTRIN) 600 MG tablet       valsartan-hydroCHLOROthiazide (DIOVAN-HCT) 320-12.5 MG per tablet Take 1 tablet by mouth daily 90 tablet 3    lovastatin (MEVACOR) 40 MG tablet Take 1 tablet by mouth daily 90 tablet 3    hydrALAZINE (APRESOLINE) 50 MG tablet Take 1 tablet by mouth 3 times daily 270 tablet 3    metoprolol tartrate (LOPRESSOR) 25 MG tablet Take 1 tablet by mouth daily 180 tablet 3    aspirin 81 MG EC tablet Take 81 mg by mouth daily      polyethylene glycol (GLYCOLAX) packet Take 17 g by mouth nightly      NONFORMULARY mylan/levothyrox 0.175 mg daily      Ascorbic Acid (VITAMIN C) 500 MG CAPS Take by mouth daily      Omega-3 Fatty Acids (FISH OIL PO) Take 1 capsule by mouth 3 times daily Indications: LAST DOSE 2/27/2020       [DISCONTINUED] montelukast (SINGULAIR) 10 MG tablet take 1 tablet by mouth once daily (Patient not taking: Reported on 3/2/2023) 90 tablet 3    [DISCONTINUED] tamsulosin (FLOMAX) 0.4 MG capsule Take 1 capsule by mouth daily 90 capsule 3    [DISCONTINUED] fluticasone (FLONASE) 50 MCG/ACT nasal spray 2 sprays by Each Nostril route daily (Patient not taking: Reported on 3/2/2023) 16 g 5    Testosterone 1.62 % GEL Place 2 Pump onto the skin daily       No facility-administered encounter medications on file as of 3/2/2023. Current Outpatient Medications on File Prior to Visit   Medication Sig Dispense Refill    levothyroxine (SYNTHROID) 175 MCG tablet       potassium chloride (KLOR-CON M) 20 MEQ extended release tablet Take 1 tablet by mouth 2 times daily 180 tablet 1    ibuprofen (ADVIL;MOTRIN) 600 MG tablet       valsartan-hydroCHLOROthiazide (DIOVAN-HCT) 320-12.5 MG per tablet Take 1 tablet by mouth daily 90 tablet 3    lovastatin (MEVACOR) 40 MG tablet Take 1 tablet by mouth daily 90 tablet 3    hydrALAZINE (APRESOLINE) 50 MG tablet Take 1 tablet by mouth 3 times daily 270 tablet 3    metoprolol tartrate (LOPRESSOR) 25 MG tablet Take 1 tablet by mouth daily 180 tablet 3    aspirin 81 MG EC tablet Take 81 mg by mouth daily      polyethylene glycol (GLYCOLAX) packet Take 17 g by mouth nightly      NONFORMULARY mylan/levothyrox 0.175 mg daily      Ascorbic Acid (VITAMIN C) 500 MG CAPS Take by mouth daily      Omega-3 Fatty Acids (FISH OIL PO) Take 1 capsule by mouth 3 times daily Indications: LAST DOSE 2/27/2020       Testosterone 1.62 % GEL Place 2 Pump onto the skin daily       No current facility-administered medications on file prior to visit. Patient has no known allergies.   Family History   Problem Relation Age of Onset    Diabetes Mother     Alzheimer's Disease Mother      Social History     Tobacco Use   Smoking Status Never   Smokeless Tobacco Never       Social History     Substance and Sexual Activity   Alcohol Use Yes    Comment: occ       Review of Systems   Constitutional:  Negative for appetite change, chills and fever. Eyes:  Negative for redness and visual disturbance. Respiratory:  Negative for cough, shortness of breath and wheezing. Cardiovascular:  Negative for chest pain and leg swelling. Gastrointestinal:  Negative for abdominal pain, constipation, nausea and vomiting. Genitourinary:  Negative for decreased urine volume, difficulty urinating, dysuria, enuresis, flank pain, frequency, hematuria, penile discharge, penile pain, scrotal swelling, testicular pain and urgency. Musculoskeletal:  Negative for back pain, joint swelling and myalgias. Skin:  Negative for color change, rash and wound. Neurological:  Negative for dizziness, tremors and numbness. Hematological:  Negative for adenopathy. Does not bruise/bleed easily. BP 96/70 (Site: Left Upper Arm, Position: Sitting, Cuff Size: Large Adult)   Pulse 83   Temp 96.9 °F (36.1 °C)   Ht 5' 11\" (1.803 m)   Wt (!) 303 lb (137.4 kg)   BMI 42.26 kg/m²       PHYSICAL EXAM:  Constitutional: Patient in no acute distress; Neuro: alert and oriented to person place and time. Psych: Mood and affect normal.  Skin: Normal  Lungs: Respiratory effort normal  Cardiovascular:  Normal peripheral pulses  Abdomen: Soft, non-tender, non-distended with no CVA, flank pain  Bladder non-tender and not distended. Lab Results   Component Value Date    BUN 18 03/02/2023     Lab Results   Component Value Date    CREATININE 1.17 03/02/2023     Lab Results   Component Value Date    PSA <0.02 02/21/2023    PSA 0.10 02/28/2022    PSA 0.18 02/25/2021       ASSESSMENT:   Diagnosis Orders   1. BPH with obstruction/lower urinary tract symptoms  WA KHARI POST-VOIDING RESIDUAL URINE&/BLADDER CAP    Urinalysis with Microscopic    Culture, Urine      2.  Urgency of urination  WA KHARI POST-VOIDING RESIDUAL URINE&/BLADDER CAP    Urinalysis with Microscopic    Culture, Urine 3. Urge incontinence  WI KHARI POST-VOIDING RESIDUAL URINE&/BLADDER CAP    Urinalysis with Microscopic    Culture, Urine      4. Renal stones  XR ABDOMEN (KUB) (SINGLE AP VIEW)      5. Screening PSA (prostate specific antigen)  PSA Screening            PLAN:  UA    I offered cystoscopy versus increasing Flomax to twice per day. He would like to start with increasing Flomax to twice per day.     Follow-up in 6 weeks to reassess urinary symptoms    KUB and PSA due in 1 year

## 2023-03-02 NOTE — PATIENT INSTRUCTIONS
SURVEY:    You may be receiving a survey from [employer] regarding your visit today. Please complete the survey to enable us to provide the highest quality of care to you and your family. If you cannot score us a very good on any question, please call the office to discuss how we could have made your experience a very good one. Thank you.

## 2023-03-03 LAB
MICROORGANISM SPEC CULT: NO GROWTH
SPECIMEN DESCRIPTION: NORMAL

## 2023-03-06 ENCOUNTER — TELEPHONE (OUTPATIENT)
Dept: UROLOGY | Age: 68
End: 2023-03-06

## 2023-03-06 NOTE — TELEPHONE ENCOUNTER
----- Message from DARLEEN Reid - CNP sent at 3/6/2023 10:48 AM EST -----  Call pt - urine cx reviewed and negative for UTI & for significant microhematuria

## 2023-03-08 ENCOUNTER — OFFICE VISIT (OUTPATIENT)
Dept: PRIMARY CARE CLINIC | Age: 68
End: 2023-03-08
Payer: COMMERCIAL

## 2023-03-08 VITALS
SYSTOLIC BLOOD PRESSURE: 114 MMHG | RESPIRATION RATE: 22 BRPM | WEIGHT: 304.5 LBS | HEART RATE: 82 BPM | DIASTOLIC BLOOD PRESSURE: 70 MMHG | OXYGEN SATURATION: 98 % | BODY MASS INDEX: 42.47 KG/M2 | TEMPERATURE: 97.7 F

## 2023-03-08 DIAGNOSIS — I10 ESSENTIAL HYPERTENSION: Primary | ICD-10-CM

## 2023-03-08 DIAGNOSIS — E23.0 HYPOPITUITARISM (HCC): ICD-10-CM

## 2023-03-08 DIAGNOSIS — R73.01 ELEVATED FASTING BLOOD SUGAR: ICD-10-CM

## 2023-03-08 DIAGNOSIS — E66.01 OBESITY, CLASS III, BMI 40-49.9 (MORBID OBESITY) (HCC): ICD-10-CM

## 2023-03-08 DIAGNOSIS — K21.9 GASTROESOPHAGEAL REFLUX DISEASE WITHOUT ESOPHAGITIS: ICD-10-CM

## 2023-03-08 LAB — HBA1C MFR BLD: 6.2 %

## 2023-03-08 PROCEDURE — 1123F ACP DISCUSS/DSCN MKR DOCD: CPT | Performed by: NURSE PRACTITIONER

## 2023-03-08 PROCEDURE — 3074F SYST BP LT 130 MM HG: CPT | Performed by: NURSE PRACTITIONER

## 2023-03-08 PROCEDURE — 3078F DIAST BP <80 MM HG: CPT | Performed by: NURSE PRACTITIONER

## 2023-03-08 PROCEDURE — 83036 HEMOGLOBIN GLYCOSYLATED A1C: CPT | Performed by: NURSE PRACTITIONER

## 2023-03-08 PROCEDURE — 99214 OFFICE O/P EST MOD 30 MIN: CPT | Performed by: NURSE PRACTITIONER

## 2023-03-08 RX ORDER — OMEPRAZOLE 40 MG/1
40 CAPSULE, DELAYED RELEASE ORAL
Qty: 90 CAPSULE | Refills: 3 | Status: SHIPPED | OUTPATIENT
Start: 2023-03-08

## 2023-03-08 SDOH — ECONOMIC STABILITY: INCOME INSECURITY: HOW HARD IS IT FOR YOU TO PAY FOR THE VERY BASICS LIKE FOOD, HOUSING, MEDICAL CARE, AND HEATING?: PATIENT DECLINED

## 2023-03-08 SDOH — ECONOMIC STABILITY: FOOD INSECURITY: WITHIN THE PAST 12 MONTHS, YOU WORRIED THAT YOUR FOOD WOULD RUN OUT BEFORE YOU GOT MONEY TO BUY MORE.: PATIENT DECLINED

## 2023-03-08 SDOH — ECONOMIC STABILITY: FOOD INSECURITY: WITHIN THE PAST 12 MONTHS, THE FOOD YOU BOUGHT JUST DIDN'T LAST AND YOU DIDN'T HAVE MONEY TO GET MORE.: PATIENT DECLINED

## 2023-03-08 SDOH — ECONOMIC STABILITY: HOUSING INSECURITY
IN THE LAST 12 MONTHS, WAS THERE A TIME WHEN YOU DID NOT HAVE A STEADY PLACE TO SLEEP OR SLEPT IN A SHELTER (INCLUDING NOW)?: PATIENT REFUSED

## 2023-03-08 ASSESSMENT — ENCOUNTER SYMPTOMS
SORE THROAT: 0
HEARTBURN: 1
HOARSE VOICE: 0
BELCHING: 0
GLOBUS SENSATION: 0
SHORTNESS OF BREATH: 0
WHEEZING: 0
ABDOMINAL PAIN: 1
DIARRHEA: 0
BLURRED VISION: 0
RHINORRHEA: 0
ORTHOPNEA: 0
CHOKING: 0
CONSTIPATION: 0
NAUSEA: 0
WATER BRASH: 0
VOMITING: 0
COUGH: 0

## 2023-03-08 ASSESSMENT — PATIENT HEALTH QUESTIONNAIRE - PHQ9
2. FEELING DOWN, DEPRESSED OR HOPELESS: 0
SUM OF ALL RESPONSES TO PHQ QUESTIONS 1-9: 0
SUM OF ALL RESPONSES TO PHQ9 QUESTIONS 1 & 2: 0
1. LITTLE INTEREST OR PLEASURE IN DOING THINGS: 0
SUM OF ALL RESPONSES TO PHQ QUESTIONS 1-9: 0

## 2023-03-08 NOTE — PROGRESS NOTES
Name: Yadi Childers  : 1955         Chief Complaint:     Chief Complaint   Patient presents with    Hypertension     Routine check. No concerns    Gastroesophageal Reflux     Increase in discomfort. History of Present Illness:      Yadi Childers is a 74 y.o.  male who presents with Hypertension (Routine check. No concerns) and Gastroesophageal Reflux (Increase in discomfort.)      Felice Contreras is here today for a routine office visit. Potassium has normalized with supplementation, continue daily. Hypertension  This is a chronic problem. The current episode started more than 1 year ago. The problem is unchanged. The problem is controlled. Associated symptoms include peripheral edema (INTERMITTENTLY). Pertinent negatives include no anxiety, blurred vision, chest pain, headaches, malaise/fatigue, neck pain, orthopnea, palpitations, PND, shortness of breath or sweats. There are no associated agents to hypertension. Risk factors for coronary artery disease include dyslipidemia, family history, obesity, male gender, stress and sedentary lifestyle. Past treatments include angiotensin blockers, beta blockers, diuretics and direct vasodilators. The current treatment provides moderate improvement. Compliance problems include exercise and diet. Hypertensive end-organ damage includes CAD/MI and heart failure. There is no history of kidney disease or CVA. There is no history of chronic renal disease. Gastroesophageal Reflux  He complains of abdominal pain (epigastric), dysphagia and heartburn. He reports no belching, no chest pain, no choking, no coughing, no early satiety, no globus sensation, no hoarse voice, no nausea, no sore throat, no water brash or no wheezing. This is a chronic problem. The current episode started more than 1 year ago. The problem occurs occasionally. The problem has been waxing and waning. The heartburn duration is less than a minute. The heartburn is located in the substernum.  The heartburn is of moderate intensity. The heartburn does not wake him from sleep. The heartburn does not limit his activity. The heartburn changes with position. The symptoms are aggravated by certain foods and caffeine. Pertinent negatives include no anemia, fatigue, melena, muscle weakness, orthopnea or weight loss. Risk factors include lack of exercise, obesity and caffeine use. He has tried a PPI and an antacid for the symptoms. The treatment provided moderate relief. Past procedures do not include an EGD or a UGI. Past invasive treatments do not include gastroplasty, gastroplication or reflux surgery. Past Medical History:     Past Medical History:   Diagnosis Date    Acromegalia ([address]     Arthritis     H/O echocardiogram 09/13/2017    poor image quality. EF 60%. Moderate LV hypertrophy normal LV cavity size. Unable to assess specific wall motion abnormalities due to image quality. No significant valvular abnormalities. Mild diastolic dysfunction is seen. History of stress test     25+ years ago. per patient it was normal    History of stress test 09/14/2017    Equivocal study. Moderate perfusion defect of moderate intensity in the inferolateral and inferior regions during stress and rest imaging. EF  71% without regional wall motion abnormalities. No significant electrocardiographic evidence of MI during EKG Vu Treadmill score 6,low risk for CAD.     Hyperlipidemia     Hypertension     Left retinal detachment     Sleep apnea     C-PAP    Thyroid disease     HYPOTHYROID    Ureteral stone     history      Reviewed all health maintenance requirements and ordered appropriate tests  Health Maintenance Due   Topic Date Due    Depression Screen  03/24/2023       Past Surgical History:     Past Surgical History:   Procedure Laterality Date    CATARACT REMOVAL Right     CATARACT REMOVAL      CATARACT REMOVAL WITH IMPLANT Left 07/13/2020    MHT/    COLONOSCOPY  2014    CYSTOSCOPY Right 03/10/2017 EYE SURGERY      HIP ARTHROPLASTY Right     KOLE    INTRACAPSULAR CATARACT EXTRACTION Left 7/13/2020    EYE CATARACT EMULSIFICATION IOL IMPLANT performed by Iglesia Almeida DO at [address]      LITHOTRIPSY Right 11/24/2020    ESWL EXTRACORPOREAL SHOCK WAVE LITHOTRIPSY (Right )     LITHOTRIPSY Right 11/24/2020    ESWL EXTRACORPOREAL SHOCK WAVE LITHOTRIPSY performed by Job Rutledge MD at [address] [facility] Bilateral     SHOULDER SURGERY      TOTAL KNEE ARTHROPLASTY Bilateral     VASCULAR SURGERY      VITRECTOMY Left 03/04/2020    VITRECTOMY Left 3/4/2020    VITRECTOMY 25 GAUGE, AIR FLUID EXCHANGE, ENDOLASER 200MW 200MS 607  SPOTS , AIR GAS EXCHANGE WITH 20 % SF6 performed by Francesca Narvaez MD at [place] [address]        Medications:       Prior to Admission medications    Medication Sig Start Date End Date Taking?  Authorizing Provider   omeprazole (PRILOSEC) 40 MG delayed release capsule Take 1 capsule by mouth every morning (before breakfast) 3/8/23  Yes DARLEEN Godinez CNP   tamsulosin (FLOMAX) 0.4 MG capsule Take 1 capsule by mouth in the morning and at bedtime 3/2/23  Yes DARLEEN Diaz CNP   potassium chloride (KLOR-CON M) 20 MEQ extended release tablet Take 1 tablet by mouth 2 times daily 2/22/23  Yes DARLEEN Godinez CNP   ibuprofen (ADVIL;MOTRIN) 600 MG tablet  2/8/22  Yes Historical Provider, MD   valsartan-hydroCHLOROthiazide (DIOVAN-HCT) 320-12.5 MG per tablet Take 1 tablet by mouth daily 9/24/21  Yes DARLEEN Godinez CNP   lovastatin (MEVACOR) 40 MG tablet Take 1 tablet by mouth daily 9/24/21  Yes DARLEEN Godinez CNP   hydrALAZINE (APRESOLINE) 50 MG tablet Take 1 tablet by mouth 3 times daily 9/24/21  Yes DARLEEN Godinez CNP   metoprolol tartrate (LOPRESSOR) 25 MG tablet Take 1 tablet by mouth daily 9/24/21  Yes DARLEEN Godinez CNP   aspirin 81 MG EC tablet Take 81 mg by mouth daily   Yes Historical Provider, MD   Testosterone 1.62 % GEL Place 2 Pump onto the skin daily   Yes Historical Provider, MD   polyethylene glycol (GLYCOLAX) packet Take 17 g by mouth nightly   Yes Historical Provider, MD   NONFORMULARY mylan/levothyrox 0.175 mg daily   Yes Historical Provider, MD   Ascorbic Acid (VITAMIN C) 500 MG CAPS Take by mouth daily   Yes Historical Provider, MD   Omega-3 Fatty Acids (FISH OIL PO) Take 1 capsule by mouth 3 times daily Indications: LAST DOSE 2/27/2020    Yes Historical Provider, MD        Allergies:       Patient has no known allergies. Social History:     Tobacco:    reports that he has never smoked. He has never used smokeless tobacco.  Alcohol:      reports current alcohol use. Drug Use:  reports no history of drug use. Family History:     Family History   Problem Relation Age of Onset    Diabetes Mother     Alzheimer's Disease Mother        Review of Systems:     Positive and Negative as described in HPI    Review of Systems   Constitutional:  Negative for chills, fatigue, fever, malaise/fatigue and weight loss. HENT:  Negative for congestion, ear pain, hoarse voice, rhinorrhea and sore throat. Eyes:  Negative for blurred vision and visual disturbance. Respiratory:  Negative for cough, choking, shortness of breath and wheezing. Cardiovascular:  Negative for chest pain, palpitations, orthopnea and PND. Gastrointestinal:  Positive for abdominal pain (epigastric), dysphagia and heartburn. Negative for constipation, diarrhea, melena, nausea and vomiting. Genitourinary:  Negative for difficulty urinating and dysuria. Musculoskeletal:  Negative for gait problem, myalgias, muscle weakness, neck pain and neck stiffness. Skin:  Negative for rash. Neurological:  Negative for dizziness, syncope, light-headedness and headaches.      Physical Exam:   Vitals:  /70 (Position: Sitting)   Pulse 82   Temp 97.7 °F (36.5 °C) (Temporal)   Resp 22   Wt (!) 304 lb 8 oz (138.1 kg)   SpO2 98%   BMI 42.47 kg/m²     Physical Exam  Vitals and nursing note reviewed. Constitutional:       General: He is not in acute distress. Appearance: Normal appearance. He is well-developed. He is obese. He is not ill-appearing. HENT:      Mouth/Throat:      Mouth: Mucous membranes are moist. Mucous membranes are not dry. Pharynx: Oropharynx is clear. Eyes:      General: No scleral icterus. Conjunctiva/sclera: Conjunctivae normal.   Neck:      Vascular: No carotid bruit. Cardiovascular:      Rate and Rhythm: Normal rate and regular rhythm. Heart sounds: No murmur heard. Pulmonary:      Effort: Pulmonary effort is normal.      Breath sounds: Normal breath sounds. No wheezing or rales. Abdominal:      General: Bowel sounds are normal. There is no distension. Palpations: Abdomen is soft. Tenderness: There is no abdominal tenderness. Musculoskeletal:      Cervical back: Normal range of motion and neck supple. Right lower leg: No edema. Left lower leg: No edema. Lymphadenopathy:      Cervical: No cervical adenopathy. Skin:     General: Skin is warm and dry. Findings: No rash. Neurological:      Mental Status: He is alert and oriented to person, place, and time.    Psychiatric:         Mood and Affect: Mood normal.         Behavior: Behavior normal.       Data:     Lab Results   Component Value Date/Time     03/02/2023 10:47 AM    K 3.6 03/02/2023 10:47 AM     03/02/2023 10:47 AM    CO2 28 03/02/2023 10:47 AM    BUN 18 03/02/2023 10:47 AM    CREATININE 1.17 03/02/2023 10:47 AM    GLUCOSE 144 03/02/2023 10:47 AM    PROT 7.0 08/18/2021 06:10 PM    LABALBU 4.2 08/18/2021 06:10 PM    BILITOT 0.50 08/18/2021 06:10 PM    ALKPHOS 72 08/18/2021 06:10 PM    AST 30 02/21/2023 08:12 AM    ALT 40 02/21/2023 08:12 AM     Lab Results   Component Value Date/Time    WBC 5.3 02/21/2023 08:12 AM    RBC 4.64 02/21/2023 08:12 AM    HGB 14.0 02/21/2023 08:12 AM    HCT 41.3 02/21/2023 08:12 AM    MCV 89.0 02/21/2023 08:12 AM    MCH 30.2 02/21/2023 08:12 AM    MCHC 33.9 02/21/2023 08:12 AM    RDW 12.7 02/21/2023 08:12 AM     02/21/2023 08:12 AM    MPV 9.3 02/21/2023 08:12 AM     Lab Results   Component Value Date/Time    TSH 0.65 07/25/2017 06:55 PM     Lab Results   Component Value Date/Time    CHOL 126 02/21/2023 08:11 AM    HDL 31 02/21/2023 08:11 AM    PSA <0.02 02/21/2023 08:12 AM    LABA1C 6.2 03/08/2023 02:29 PM       Assessment/Plan:      Diagnosis Orders   1. Essential hypertension  Basic Metabolic Panel      2. Gastroesophageal reflux disease without esophagitis  omeprazole (PRILOSEC) 40 MG delayed release capsule      3. Hypopituitarism ([address]        4. Elevated fasting blood sugar  POCT glycosylated hemoglobin (Hb A1C)      5. Obesity, Class III, BMI 40-49.9 (morbid obesity) (HCC)          Continue current medications, labs and BP stable, potassium improved. Start omeprazole every daily for at least 2 weeks then may take every 2nd or 3rd day. Call with progress. Continue to work on lifestyle modification including diet and exercise. Continue follow up with endocrinologist as scheduled. We will see him back in 6 months, sooner if any issues. 1.  Alfonzo Barraza received counseling on the following healthy behaviors: nutrition, exercise, and medication adherence  2. Patient given educational materials - see patient instructions  3. Was a self-tracking handout given in paper form or via [employer]? No  If yes, see orders or list here. 4.  Discussed use, benefit, and side effects of prescribed medications. Barriers to medication compliance addressed. All patient questions answered. Pt voiced understanding. 5.  Reviewed prior labs and health maintenance  6. Continue current medications, diet and exercise.     Completed Refills   Requested Prescriptions     Signed Prescriptions Disp Refills    omeprazole (PRILOSEC) 40 MG delayed release capsule 90 capsule 3     Sig: Take 1 capsule by mouth every morning (before breakfast)         Return in about 6 months (around 9/8/2023) for Check up.

## 2023-03-08 NOTE — PATIENT INSTRUCTIONS
SURVEY:     You may be receiving a survey from [employer] regarding your visit today. Please complete the survey to enable us to provide the highest quality of care to you and your family. If you cannot score us a very good on any question, please call the office to discuss how we could have made your experience a very good one.      Thank you,    Moy Nieves, APRN-CNP  Milton Ferrera, APRN-CNP  Saul Cardenas, RUDDY Treadwell, VENKAT Sam, CMA  Rhianna, CMA  Itzel, PCA  Kelly, PM

## 2023-04-21 RX ORDER — POTASSIUM CHLORIDE 20 MEQ/1
20 TABLET, EXTENDED RELEASE ORAL 2 TIMES DAILY
Qty: 180 TABLET | Refills: 1 | Status: SHIPPED | OUTPATIENT
Start: 2023-04-21

## 2023-05-03 ENCOUNTER — OFFICE VISIT (OUTPATIENT)
Dept: UROLOGY | Age: 68
End: 2023-05-03
Payer: COMMERCIAL

## 2023-05-03 VITALS
WEIGHT: 301 LBS | SYSTOLIC BLOOD PRESSURE: 105 MMHG | HEART RATE: 89 BPM | BODY MASS INDEX: 42.14 KG/M2 | HEIGHT: 71 IN | DIASTOLIC BLOOD PRESSURE: 77 MMHG

## 2023-05-03 DIAGNOSIS — N39.41 URGE INCONTINENCE: ICD-10-CM

## 2023-05-03 DIAGNOSIS — N13.8 BPH WITH OBSTRUCTION/LOWER URINARY TRACT SYMPTOMS: Primary | ICD-10-CM

## 2023-05-03 DIAGNOSIS — R39.15 URGENCY OF URINATION: ICD-10-CM

## 2023-05-03 DIAGNOSIS — N40.1 BPH WITH OBSTRUCTION/LOWER URINARY TRACT SYMPTOMS: Primary | ICD-10-CM

## 2023-05-03 PROCEDURE — 3074F SYST BP LT 130 MM HG: CPT | Performed by: PHYSICIAN ASSISTANT

## 2023-05-03 PROCEDURE — 3078F DIAST BP <80 MM HG: CPT | Performed by: PHYSICIAN ASSISTANT

## 2023-05-03 PROCEDURE — 1036F TOBACCO NON-USER: CPT | Performed by: PHYSICIAN ASSISTANT

## 2023-05-03 PROCEDURE — 1123F ACP DISCUSS/DSCN MKR DOCD: CPT | Performed by: PHYSICIAN ASSISTANT

## 2023-05-03 PROCEDURE — 3017F COLORECTAL CA SCREEN DOC REV: CPT | Performed by: PHYSICIAN ASSISTANT

## 2023-05-03 PROCEDURE — 51798 US URINE CAPACITY MEASURE: CPT | Performed by: PHYSICIAN ASSISTANT

## 2023-05-03 PROCEDURE — G8427 DOCREV CUR MEDS BY ELIG CLIN: HCPCS | Performed by: PHYSICIAN ASSISTANT

## 2023-05-03 PROCEDURE — 99213 OFFICE O/P EST LOW 20 MIN: CPT | Performed by: PHYSICIAN ASSISTANT

## 2023-05-03 PROCEDURE — G8417 CALC BMI ABV UP PARAM F/U: HCPCS | Performed by: PHYSICIAN ASSISTANT

## 2023-05-03 ASSESSMENT — ENCOUNTER SYMPTOMS
BACK PAIN: 0
VOMITING: 0
COUGH: 0
EYE REDNESS: 0
COLOR CHANGE: 0
WHEEZING: 0
ABDOMINAL PAIN: 0
NAUSEA: 0
SHORTNESS OF BREATH: 0
CONSTIPATION: 0

## 2023-05-03 NOTE — PATIENT INSTRUCTIONS
SURVEY:    You may be receiving a survey from [employer] regarding your visit today. Please complete the survey to enable us to provide the highest quality of care to you and your family. If you cannot score us a very good on any question, please call the office to discuss how we could have made your experience a very good one. Thank you.

## 2023-05-03 NOTE — PROGRESS NOTES
HPI:      Patient is a 74 y.o. male in no acute distress. He is alert and oriented to person, place, and time. History  Acromegalia - follows with endocrinology    3/2017 right HLL for 8mm ureteral stone    11/2020 Right ESWL for a 7mm right renal stone    3/2022 urgency and intermittent urge incontinence   Flomax    Flomax increased to twice a day    Today  Patient is here today for follow-up BPH, urgency and urgency incontinence. At last visit we increased his Flomax to twice a day. Patient states that since increasing the Flomax to twice a day he has noticed a significant improvement in his urgency and urgency incontinence. Patient only has occasional dribbling now. He states that he wears CPAP at night. Patient has daily bowel movements. Patient has nocturia x0. Overall he is very happy and tolerating medication well. Bladderscan performed in office today:Pt voided - 90 mL, PVR - 0 mL     Past Medical History:   Diagnosis Date    Acromegalia ([address]     Arthritis     H/O echocardiogram 09/13/2017    poor image quality. EF 60%. Moderate LV hypertrophy normal LV cavity size. Unable to assess specific wall motion abnormalities due to image quality. No significant valvular abnormalities. Mild diastolic dysfunction is seen. History of stress test     25+ years ago. per patient it was normal    History of stress test 09/14/2017    Equivocal study. Moderate perfusion defect of moderate intensity in the inferolateral and inferior regions during stress and rest imaging. EF  71% without regional wall motion abnormalities. No significant electrocardiographic evidence of MI during EKG Vu Treadmill score 6,low risk for CAD.     Hyperlipidemia     Hypertension     Left retinal detachment     Sleep apnea     C-PAP    Thyroid disease     HYPOTHYROID    Ureteral stone     history     Past Surgical History:   Procedure Laterality Date    CATARACT REMOVAL Right     CATARACT REMOVAL      CATARACT REMOVAL

## 2023-08-03 ENCOUNTER — APPOINTMENT (OUTPATIENT)
Dept: CT IMAGING | Age: 68
End: 2023-08-03
Payer: COMMERCIAL

## 2023-08-03 ENCOUNTER — HOSPITAL ENCOUNTER (EMERGENCY)
Age: 68
Discharge: HOME | End: 2023-08-03
Payer: COMMERCIAL

## 2023-08-03 VITALS
HEIGHT: 71 IN | BODY MASS INDEX: 41.3 KG/M2 | OXYGEN SATURATION: 97 % | SYSTOLIC BLOOD PRESSURE: 106 MMHG | RESPIRATION RATE: 16 BRPM | HEART RATE: 73 BPM | TEMPERATURE: 97.7 F | DIASTOLIC BLOOD PRESSURE: 64 MMHG | WEIGHT: 295 LBS

## 2023-08-03 DIAGNOSIS — R10.9 LEFT FLANK PAIN: Primary | ICD-10-CM

## 2023-08-03 LAB
ALBUMIN SERPL-MCNC: 4.1 G/DL (ref 3.5–5.2)
ALBUMIN/GLOB SERPL: 1.5 {RATIO} (ref 1–2.5)
ALP SERPL-CCNC: 72 U/L (ref 40–129)
ALT SERPL-CCNC: 28 U/L (ref 5–41)
ANION GAP SERPL CALCULATED.3IONS-SCNC: 9 MMOL/L (ref 9–17)
AST SERPL-CCNC: 25 U/L
BASOPHILS # BLD: <0.03 K/UL (ref 0–0.2)
BASOPHILS NFR BLD: 0 % (ref 0–2)
BILIRUB SERPL-MCNC: 0.6 MG/DL (ref 0.3–1.2)
BILIRUB UR QL STRIP: NEGATIVE
BUN SERPL-MCNC: 15 MG/DL (ref 8–23)
BUN/CREAT SERPL: 13 (ref 9–20)
CALCIUM SERPL-MCNC: 9.4 MG/DL (ref 8.6–10.4)
CHLORIDE SERPL-SCNC: 104 MMOL/L (ref 98–107)
CLARITY UR: CLEAR
CO2 SERPL-SCNC: 27 MMOL/L (ref 20–31)
COLOR UR: YELLOW
CREAT SERPL-MCNC: 1.2 MG/DL (ref 0.7–1.2)
EOSINOPHIL # BLD: 0.15 K/UL (ref 0–0.44)
EOSINOPHILS RELATIVE PERCENT: 2 % (ref 1–4)
ERYTHROCYTE [DISTWIDTH] IN BLOOD BY AUTOMATED COUNT: 12.5 % (ref 11.8–14.4)
GFR SERPL CREATININE-BSD FRML MDRD: >60 ML/MIN/1.73M2
GLUCOSE SERPL-MCNC: 109 MG/DL (ref 70–99)
GLUCOSE UR STRIP-MCNC: NEGATIVE MG/DL
HCT VFR BLD AUTO: 39.7 % (ref 40.7–50.3)
HGB BLD-MCNC: 13.4 G/DL (ref 13–17)
HGB UR QL STRIP.AUTO: NEGATIVE
IMM GRANULOCYTES # BLD AUTO: 0.03 K/UL (ref 0–0.3)
IMM GRANULOCYTES NFR BLD: 0 %
KETONES UR STRIP-MCNC: NEGATIVE MG/DL
LEUKOCYTE ESTERASE UR QL STRIP: NEGATIVE
LIPASE SERPL-CCNC: 40 U/L (ref 13–60)
LYMPHOCYTES NFR BLD: 1.15 K/UL (ref 1.1–3.7)
LYMPHOCYTES RELATIVE PERCENT: 14 % (ref 24–43)
MCH RBC QN AUTO: 29 PG (ref 25.2–33.5)
MCHC RBC AUTO-ENTMCNC: 33.8 G/DL (ref 28.4–34.8)
MCV RBC AUTO: 85.9 FL (ref 82.6–102.9)
MONOCYTES NFR BLD: 0.61 K/UL (ref 0.1–1.2)
MONOCYTES NFR BLD: 8 % (ref 3–12)
NEUTROPHILS NFR BLD: 76 % (ref 36–65)
NEUTS SEG NFR BLD: 6.05 K/UL (ref 1.5–8.1)
NITRITE UR QL STRIP: NEGATIVE
NRBC BLD-RTO: 0 PER 100 WBC
PH UR STRIP: 6 [PH] (ref 5–9)
PLATELET # BLD AUTO: ABNORMAL K/UL (ref 138–453)
PLATELET, FLUORESCENCE: 136 K/UL (ref 138–453)
PLATELETS.RETICULATED NFR BLD AUTO: 1.6 % (ref 1.1–10.3)
POTASSIUM SERPL-SCNC: 3.8 MMOL/L (ref 3.7–5.3)
PROT SERPL-MCNC: 6.8 G/DL (ref 6.4–8.3)
PROT UR STRIP-MCNC: NEGATIVE MG/DL
RBC # BLD AUTO: 4.62 M/UL (ref 4.21–5.77)
SODIUM SERPL-SCNC: 140 MMOL/L (ref 135–144)
SP GR UR STRIP: 1.02 (ref 1.01–1.02)
UROBILINOGEN UR STRIP-ACNC: NORMAL EU/DL (ref 0–1)
WBC OTHER # BLD: 8 K/UL (ref 3.5–11.3)

## 2023-08-03 PROCEDURE — 74176 CT ABD & PELVIS W/O CONTRAST: CPT

## 2023-08-03 PROCEDURE — 36415 COLL VENOUS BLD VENIPUNCTURE: CPT

## 2023-08-03 PROCEDURE — 80053 COMPREHEN METABOLIC PANEL: CPT

## 2023-08-03 PROCEDURE — 85025 COMPLETE CBC W/AUTO DIFF WBC: CPT

## 2023-08-03 PROCEDURE — 99284 EMERGENCY DEPT VISIT MOD MDM: CPT

## 2023-08-03 PROCEDURE — 96374 THER/PROPH/DIAG INJ IV PUSH: CPT

## 2023-08-03 PROCEDURE — 6360000002 HC RX W HCPCS: Performed by: PHYSICIAN ASSISTANT

## 2023-08-03 PROCEDURE — 83690 ASSAY OF LIPASE: CPT

## 2023-08-03 PROCEDURE — 2580000003 HC RX 258: Performed by: PHYSICIAN ASSISTANT

## 2023-08-03 PROCEDURE — 81003 URINALYSIS AUTO W/O SCOPE: CPT

## 2023-08-03 RX ORDER — 0.9 % SODIUM CHLORIDE 0.9 %
1000 INTRAVENOUS SOLUTION INTRAVENOUS ONCE
Status: COMPLETED | OUTPATIENT
Start: 2023-08-03 | End: 2023-08-03

## 2023-08-03 RX ORDER — KETOROLAC TROMETHAMINE 15 MG/ML
15 INJECTION, SOLUTION INTRAMUSCULAR; INTRAVENOUS ONCE
Status: COMPLETED | OUTPATIENT
Start: 2023-08-03 | End: 2023-08-03

## 2023-08-03 RX ADMIN — SODIUM CHLORIDE 1000 ML: 9 INJECTION, SOLUTION INTRAVENOUS at 11:54

## 2023-08-03 RX ADMIN — KETOROLAC TROMETHAMINE 15 MG: 15 INJECTION, SOLUTION INTRAMUSCULAR; INTRAVENOUS at 11:59

## 2023-08-03 ASSESSMENT — ENCOUNTER SYMPTOMS: RESPIRATORY NEGATIVE: 1

## 2023-08-03 ASSESSMENT — PAIN DESCRIPTION - ORIENTATION
ORIENTATION: RIGHT;LEFT
ORIENTATION: RIGHT;LEFT

## 2023-08-03 ASSESSMENT — PAIN DESCRIPTION - LOCATION
LOCATION: FLANK
LOCATION: FLANK

## 2023-08-03 ASSESSMENT — LIFESTYLE VARIABLES
HOW OFTEN DO YOU HAVE A DRINK CONTAINING ALCOHOL: NEVER
HOW MANY STANDARD DRINKS CONTAINING ALCOHOL DO YOU HAVE ON A TYPICAL DAY: PATIENT DOES NOT DRINK

## 2023-08-03 ASSESSMENT — PAIN SCALES - GENERAL
PAINLEVEL_OUTOF10: 7
PAINLEVEL_OUTOF10: 2

## 2023-08-03 NOTE — ED NOTES
Pt given urinal, told that we need urine specimen.  Call light within reach      Olegario Milan RN  08/03/23 [phone]

## 2023-08-03 NOTE — ED PROVIDER NOTES
[address] ED  EMERGENCY DEPARTMENT ENCOUNTER      Pt Name: Cedric Shankar  MRN: [id number]  [address] [facility], [place], operated by [facility] 1955  Date of evaluation: 8/3/2023  Provider: Sariah Jovel, [facility]       Chief Complaint   Patient presents with    Flank Pain     Bilateral. Started last night before he went to bed. Pt states that he has known kidney stones. Pt denies taking any pain medications PTA          HISTORY OF PRESENT ILLNESS   (Location/Symptom, Timing/Onset, Context/Setting, Quality, Duration, Modifying Factors, Severity)  Note limiting factors. Cedric Shankar is a 74 y.o. male who presents to the emergency department in stable condition PMH acromegaly kidney stones this patient reports last night after getting out of the recliner and walking over the computer he developed left flank pain that progressed to bilateral flank pain left greater than right with minimal pain radiating to left abdomen somewhat similar to prior kidney stones that is persisted since onset. Patient denies nausea vomiting significant abdominal pain history of fever chest pain acute shortness of breath lumbar radicular symptoms changes to bowel or bladder function syncopal events urinary symptoms or other complaints. No other symptoms noted, patient has no additional complaints. HPI    Nursing Notes were reviewed. REVIEW OF SYSTEMS    (2-9 systems for level 4, 10 or more for level 5)     Review of Systems   Constitutional: Negative. Negative for fever. Respiratory: Negative. Cardiovascular: Negative. Gastrointestinal:         See hpi     Genitourinary:         See hpi   Musculoskeletal: Negative. Neurological: Negative. Psychiatric/Behavioral: Negative. Except as noted above the remainder of the review of systems was reviewed and negative. PAST MEDICAL HISTORY     Past Medical History:   Diagnosis Date    Acromegalia ([address])     Arthritis     H/O echocardiogram 09/13/2017    poor image quality.  EF 60%.  Moderate LV hypertrophy normal LV cavity size. Unable to assess specific wall motion abnormalities due to image quality. No significant valvular abnormalities. Mild diastolic dysfunction is seen. History of stress test     25+ years ago. per patient it was normal    History of stress test 09/14/2017    Equivocal study. Moderate perfusion defect of moderate intensity in the inferolateral and inferior regions during stress and rest imaging. EF  71% without regional wall motion abnormalities. No significant electrocardiographic evidence of MI during EKG Vu Treadmill score 6,low risk for CAD.     Hyperlipidemia     Hypertension     Left retinal detachment     Sleep apnea     C-PAP    Thyroid disease     HYPOTHYROID    Ureteral stone     history         SURGICAL HISTORY       Past Surgical History:   Procedure Laterality Date    CATARACT REMOVAL Right     CATARACT REMOVAL      CATARACT REMOVAL WITH IMPLANT Left 07/13/2020    [facility]/    COLONOSCOPY  2014    CYSTOSCOPY Right 03/10/2017    EYE SURGERY      HIP ARTHROPLASTY Right     KOLE    INTRACAPSULAR CATARACT EXTRACTION Left 7/13/2020    EYE CATARACT EMULSIFICATION IOL IMPLANT performed by Layne Xie DO at [facility]      LITHOTRIPSY Right 11/24/2020    ESWL EXTRACORPOREAL SHOCK WAVE LITHOTRIPSY (Right )     LITHOTRIPSY Right 11/24/2020    ESWL EXTRACORPOREAL SHOCK WAVE LITHOTRIPSY performed by Rene Toribio MD at [address] Bilateral     SHOULDER SURGERY      TOTAL KNEE ARTHROPLASTY Bilateral     VASCULAR SURGERY      VITRECTOMY Left 03/04/2020    VITRECTOMY Left 3/4/2020    VITRECTOMY 25 GAUGE, AIR FLUID EXCHANGE, ENDOLASER 200MW 200MS 607  SPOTS , AIR GAS EXCHANGE WITH 20 % SF6 performed by Parris Weston MD at [address] [facility]       Previous Medications    ASCORBIC ACID (VITAMIN C) 500 MG CAPS    Take by mouth daily

## 2023-08-03 NOTE — DISCHARGE INSTRUCTIONS
Follow-up with primary care doctor 7 to 10 days for reevaluation. Avoid movements that worsen your pain. Take Tylenol or Motrin as directed for discomfort. Continue drink plenty of fluids as tolerated. Continue home medications as prescribed. Promptly return to emergency department for new, changing, worsening of symptoms or other concerns.

## 2023-10-31 LAB
ALBUMIN SERPL-MCNC: 4.5 G/DL
ALP BLD-CCNC: NORMAL U/L
ALT SERPL-CCNC: 33 U/L
ANION GAP SERPL CALCULATED.3IONS-SCNC: NORMAL MMOL/L
AST SERPL-CCNC: 27 U/L
BILIRUB SERPL-MCNC: NORMAL MG/DL
BUN BLDV-MCNC: NORMAL MG/DL
CALCIUM SERPL-MCNC: 9.2 MG/DL
CHLORIDE BLD-SCNC: 103 MMOL/L
CHOLESTEROL, TOTAL: 130 MG/DL
CHOLESTEROL/HDL RATIO: ABNORMAL
CO2: 28 MMOL/L
CREAT SERPL-MCNC: 1.25 MG/DL
EGFR: 1.73
ESTIMATED AVERAGE GLUCOSE: NORMAL
GLUCOSE BLD-MCNC: 114 MG/DL
HBA1C MFR BLD: 6.1 %
HDLC SERPL-MCNC: 33 MG/DL (ref 35–70)
LDL CHOLESTEROL CALCULATED: 75 MG/DL (ref 0–160)
NONHDLC SERPL-MCNC: ABNORMAL MG/DL
POTASSIUM SERPL-SCNC: 3.6 MMOL/L
SODIUM BLD-SCNC: 144 MMOL/L
TOTAL PROTEIN: NORMAL
TRIGL SERPL-MCNC: 112 MG/DL
VLDLC SERPL CALC-MCNC: ABNORMAL MG/DL

## 2024-02-27 ENCOUNTER — HOSPITAL ENCOUNTER (OUTPATIENT)
Age: 69
Discharge: HOME | End: 2024-02-27
Payer: COMMERCIAL

## 2024-02-27 ENCOUNTER — HOSPITAL ENCOUNTER (OUTPATIENT)
Dept: GENERAL RADIOLOGY | Age: 69
Discharge: HOME | End: 2024-02-29
Payer: COMMERCIAL

## 2024-02-27 ENCOUNTER — HOSPITAL ENCOUNTER (OUTPATIENT)
Age: 69
Discharge: HOME | End: 2024-02-29
Payer: COMMERCIAL

## 2024-02-27 DIAGNOSIS — Z12.5 SCREENING PSA (PROSTATE SPECIFIC ANTIGEN): ICD-10-CM

## 2024-02-27 DIAGNOSIS — N20.0 RENAL STONES: ICD-10-CM

## 2024-02-27 LAB — PSA SERPL-MCNC: 0.06 NG/ML

## 2024-02-27 PROCEDURE — G0103 PSA SCREENING: HCPCS

## 2024-02-27 PROCEDURE — 36415 COLL VENOUS BLD VENIPUNCTURE: CPT

## 2024-02-27 PROCEDURE — 74018 RADEX ABDOMEN 1 VIEW: CPT

## 2024-03-05 ENCOUNTER — OFFICE VISIT (OUTPATIENT)
Dept: UROLOGY | Age: 69
End: 2024-03-05
Payer: COMMERCIAL

## 2024-03-05 ENCOUNTER — HOSPITAL ENCOUNTER (OUTPATIENT)
Age: 69
Setting detail: SPECIMEN
Discharge: HOME | End: 2024-03-05
Payer: COMMERCIAL

## 2024-03-05 VITALS
SYSTOLIC BLOOD PRESSURE: 127 MMHG | DIASTOLIC BLOOD PRESSURE: 79 MMHG | BODY MASS INDEX: 45.05 KG/M2 | WEIGHT: 315 LBS | HEART RATE: 105 BPM | TEMPERATURE: 97.3 F

## 2024-03-05 DIAGNOSIS — N39.41 URGE INCONTINENCE: ICD-10-CM

## 2024-03-05 DIAGNOSIS — N39.41 URGE INCONTINENCE: Primary | ICD-10-CM

## 2024-03-05 DIAGNOSIS — N40.1 BPH WITH OBSTRUCTION/LOWER URINARY TRACT SYMPTOMS: ICD-10-CM

## 2024-03-05 DIAGNOSIS — R39.15 URGENCY OF URINATION: ICD-10-CM

## 2024-03-05 DIAGNOSIS — N13.8 BPH WITH OBSTRUCTION/LOWER URINARY TRACT SYMPTOMS: ICD-10-CM

## 2024-03-05 DIAGNOSIS — Z12.5 SCREENING PSA (PROSTATE SPECIFIC ANTIGEN): ICD-10-CM

## 2024-03-05 DIAGNOSIS — N20.0 RENAL STONES: ICD-10-CM

## 2024-03-05 LAB
BILIRUB UR QL STRIP: NEGATIVE
CLARITY UR: CLEAR
COLOR UR: YELLOW
GLUCOSE UR STRIP-MCNC: NEGATIVE MG/DL
HGB UR QL STRIP.AUTO: ABNORMAL
KETONES UR STRIP-MCNC: NEGATIVE MG/DL
LEUKOCYTE ESTERASE UR QL STRIP: NEGATIVE
NITRITE UR QL STRIP: NEGATIVE
PH UR STRIP: 6 [PH] (ref 5–9)
RBC #/AREA URNS HPF: ABNORMAL /HPF (ref 0–2)
SP GR UR STRIP: 1.02 (ref 1.01–1.02)
UROBILINOGEN UR STRIP-ACNC: ABNORMAL EU/DL (ref 0–1)
WBC #/AREA URNS HPF: ABNORMAL /HPF (ref 0–5)

## 2024-03-05 PROCEDURE — 3078F DIAST BP <80 MM HG: CPT | Performed by: NURSE PRACTITIONER

## 2024-03-05 PROCEDURE — 1123F ACP DISCUSS/DSCN MKR DOCD: CPT | Performed by: NURSE PRACTITIONER

## 2024-03-05 PROCEDURE — 87086 URINE CULTURE/COLONY COUNT: CPT

## 2024-03-05 PROCEDURE — 51798 US URINE CAPACITY MEASURE: CPT | Performed by: NURSE PRACTITIONER

## 2024-03-05 PROCEDURE — 3074F SYST BP LT 130 MM HG: CPT | Performed by: NURSE PRACTITIONER

## 2024-03-05 PROCEDURE — 81001 URINALYSIS AUTO W/SCOPE: CPT

## 2024-03-05 PROCEDURE — 99214 OFFICE O/P EST MOD 30 MIN: CPT | Performed by: NURSE PRACTITIONER

## 2024-03-05 ASSESSMENT — ENCOUNTER SYMPTOMS
NAUSEA: 0
VOMITING: 0
ABDOMINAL PAIN: 0
WHEEZING: 0
COUGH: 0
SHORTNESS OF BREATH: 0
EYE REDNESS: 0
BACK PAIN: 0
COLOR CHANGE: 0
CONSTIPATION: 0

## 2024-03-05 NOTE — PROGRESS NOTES
HPI:          Patient is a 68 y.o. male in no acute distress.  He is alert and oriented to person, place, and time.         History  Acromegalia - follows with endocrinology    3/2017 right HLL for 8mm ureteral stone    11/2020 Right ESWL for a 7mm right renal stone    3/2022 urgency and intermittent urge incontinence   Flomax    3/2023 urgency and intermittent urge incontinence  Flomax increased to twice a day    Today  Here today to follow-up for BPH, stones, and prostate cancer screening.  He is complaining of urgency and urge incontinence.  Last year after we started Flomax twice per day these symptoms resolved.  He is urinating every 2-3 hours during the day.  He has nocturia x1.  PVR is low. He continues to have daily bowel movements with MiraLAX daily. PSA is 0.06.  He did have a KUB completed prior to today's visit.  This film was independently reviewed and does not show any stones.    Past Medical History:   Diagnosis Date    Acromegalia (HCC)     Arthritis     H/O echocardiogram 09/13/2017    poor image quality. EF 60%.  Moderate LV hypertrophy normal LV cavity size.  Unable to assess specific wall motion abnormalities due to image quality.  No significant valvular abnormalities.  Mild diastolic dysfunction is seen.    History of stress test     25+ years ago.  per patient it was normal    History of stress test 09/14/2017    Equivocal study.  Moderate perfusion defect of moderate intensity in the inferolateral and inferior regions during stress and rest imaging.  EF  71% without regional wall motion abnormalities.  No significant electrocardiographic evidence of MI during EKG Vu Treadmill score 6,low risk for CAD.    Hyperlipidemia     Hypertension     Left retinal detachment     Sleep apnea     C-PAP    Thyroid disease     HYPOTHYROID    Ureteral stone     history     Past Surgical History:   Procedure Laterality Date    CATARACT REMOVAL Right     CATARACT REMOVAL      CATARACT REMOVAL WITH IMPLANT

## 2024-03-06 LAB — SPECIMEN DESCRIPTION: NORMAL

## 2024-03-07 ENCOUNTER — TELEPHONE (OUTPATIENT)
Dept: UROLOGY | Age: 69
End: 2024-03-07

## 2024-03-07 NOTE — TELEPHONE ENCOUNTER
----- Message from Heron Celestin PA-C sent at 3/7/2024 11:14 AM EST -----  Please call pt - urine culture reviewed and does not show UTI    Also there was no significant blood seen in his urine

## 2024-03-07 NOTE — RESULT ENCOUNTER NOTE
Please call pt - urine culture reviewed and does not show UTI    Also there was no significant blood seen in his urine

## 2024-03-14 ENCOUNTER — PROCEDURE VISIT (OUTPATIENT)
Dept: UROLOGY | Age: 69
End: 2024-03-14
Payer: COMMERCIAL

## 2024-03-14 VITALS
BODY MASS INDEX: 45.33 KG/M2 | DIASTOLIC BLOOD PRESSURE: 76 MMHG | SYSTOLIC BLOOD PRESSURE: 124 MMHG | TEMPERATURE: 97.5 F | WEIGHT: 315 LBS

## 2024-03-14 DIAGNOSIS — R39.15 URGENCY OF URINATION: ICD-10-CM

## 2024-03-14 DIAGNOSIS — N39.41 URGE INCONTINENCE: Primary | ICD-10-CM

## 2024-03-14 DIAGNOSIS — N40.1 BPH WITH OBSTRUCTION/LOWER URINARY TRACT SYMPTOMS: ICD-10-CM

## 2024-03-14 DIAGNOSIS — N13.8 BPH WITH OBSTRUCTION/LOWER URINARY TRACT SYMPTOMS: ICD-10-CM

## 2024-03-14 PROCEDURE — 52000 CYSTOURETHROSCOPY: CPT | Performed by: UROLOGY

## 2024-03-14 PROCEDURE — 99214 OFFICE O/P EST MOD 30 MIN: CPT | Performed by: UROLOGY

## 2024-03-14 PROCEDURE — 3078F DIAST BP <80 MM HG: CPT | Performed by: UROLOGY

## 2024-03-14 PROCEDURE — 1123F ACP DISCUSS/DSCN MKR DOCD: CPT | Performed by: UROLOGY

## 2024-03-14 PROCEDURE — 3074F SYST BP LT 130 MM HG: CPT | Performed by: UROLOGY

## 2024-03-14 RX ORDER — OXYBUTYNIN CHLORIDE 10 MG/1
10 TABLET, EXTENDED RELEASE ORAL DAILY
Qty: 30 TABLET | Refills: 3 | Status: SHIPPED | OUTPATIENT
Start: 2024-03-14

## 2024-03-14 ASSESSMENT — ENCOUNTER SYMPTOMS
CONSTIPATION: 0
SHORTNESS OF BREATH: 0
ABDOMINAL PAIN: 0
COUGH: 0
WHEEZING: 0
VOMITING: 0
BACK PAIN: 0
NAUSEA: 0
COLOR CHANGE: 0
EYE REDNESS: 0

## 2024-03-14 NOTE — PROGRESS NOTES
During cystoscopy the following was utilized on patient with no adverse affects:    45% SODIUM CHLORIDE 500 ML BAG  Lot number: [id number]  Expiration date: 12/2024      LIDOCAINE HYDROCHLORIDE JELLY 2%   Lot number: [id number]  Expiration date: 08/2025     CYSTOSCOPE   Lot number: [id number]  Expiration date: 05/25/2026

## 2024-03-14 NOTE — PROGRESS NOTES
HPI:          Patient is a 68 y.o. male in no acute distress.  He is alert and oriented to person, place, and time.         History  Acromegalia - follows with endocrinology     3/2017 right HLL for 8mm ureteral stone     11/2020 Right ESWL for a 7mm right renal stone     3/2022 urgency and intermittent urge incontinence              Flomax     3/2023 urgency and intermittent urge incontinence  Flomax increased to twice a day    Currently  Patient is here today for lower tract visualization.  This is due to persistent lower urinary tract symptoms.  At the last visit we did have him continue his twice daily Flomax.  Patient's main issues are with urinary urgency and urge incontinence.    Cystoscopy Procedure Note    Pre-operative Diagnosis: BPH with LUTs    Post-operative Diagnosis: Same     Surgeon: Cherelle    Assistants: None    Anesthesia : Local    Procedure Details   The risks, benefits, complications, treatment options, and expected outcomes were discussed with the patient. The patient concurred with the proposed plan, giving informed consent.    Cystoscopy was performed today under local anesthesia, using sterile technique. The patient was placed in the dorsal lithotomy position, prepped with CHG, and draped in the usual sterile fashion. A 14 Mont[place]grin flexible cystoscope was used to systematically inspect both the urethra and bladder in their entirety.    Findings:  Anterior urethra: normal without strictures  Hyperplasia: bilobar  Bladder: Normal mucosa, without lesions.  Ureteral orifice(s) was/were seen in the normal position and effluxing clear urine  Trabeculations No  Diverticulum No  Description: Minimal, high riding bladder neck         Specimens: Cytology/urine culture No                 Complications:  None; patient tolerated the procedure well.           Disposition: home           Condition: stable       Past Medical History:   Diagnosis Date    Acromegalia (HCC)     Arthritis     H/O

## 2024-04-03 ENCOUNTER — TELEPHONE (OUTPATIENT)
Dept: UROLOGY | Age: 69
End: 2024-04-03

## 2024-04-03 NOTE — TELEPHONE ENCOUNTER
We received refill request from [facility]   oxybutynin 10 MG 90-day supply 3 Refills   Writer called patient to verify this is correct. We need to cancel script with [facility] and send this to [facility]

## 2024-04-03 NOTE — TELEPHONE ENCOUNTER
Refill and appropriate at this time as we just started this medication on patient.  Patient does have a follow-up appointment 4/26/2024 in which we will make a decision on whether or not to continue medication.

## 2024-05-01 ENCOUNTER — HOSPITAL ENCOUNTER (OUTPATIENT)
Age: 69
Discharge: HOME | End: 2024-05-01
Payer: MEDICARE

## 2024-05-01 ENCOUNTER — OFFICE VISIT (OUTPATIENT)
Dept: FAMILY MEDICINE CLINIC | Age: 69
End: 2024-05-01
Payer: MEDICARE

## 2024-05-01 VITALS
WEIGHT: 315 LBS | SYSTOLIC BLOOD PRESSURE: 116 MMHG | BODY MASS INDEX: 44.1 KG/M2 | DIASTOLIC BLOOD PRESSURE: 74 MMHG | HEIGHT: 71 IN | HEART RATE: 64 BPM | OXYGEN SATURATION: 96 %

## 2024-05-01 DIAGNOSIS — E87.6 HYPOKALEMIA: ICD-10-CM

## 2024-05-01 DIAGNOSIS — R05.3 PERSISTENT COUGH: ICD-10-CM

## 2024-05-01 DIAGNOSIS — E23.0 HYPOPITUITARISM (HCC): ICD-10-CM

## 2024-05-01 DIAGNOSIS — R73.03 PREDIABETES: ICD-10-CM

## 2024-05-01 DIAGNOSIS — R14.0 ABDOMINAL BLOATING: Primary | ICD-10-CM

## 2024-05-01 DIAGNOSIS — E22.0 ACROMEGALY (HCC): ICD-10-CM

## 2024-05-01 DIAGNOSIS — E78.2 MIXED HYPERLIPIDEMIA: ICD-10-CM

## 2024-05-01 PROBLEM — N39.41 URGE INCONTINENCE: Status: RESOLVED | Noted: 2024-03-14 | Resolved: 2024-05-01

## 2024-05-01 PROBLEM — R39.15 URGENCY OF URINATION: Status: RESOLVED | Noted: 2024-03-14 | Resolved: 2024-05-01

## 2024-05-01 PROBLEM — I95.1 ORTHOSTATIC HYPOTENSION: Status: ACTIVE | Noted: 2023-10-31

## 2024-05-01 LAB
ANION GAP SERPL CALCULATED.3IONS-SCNC: 8 MMOL/L (ref 9–17)
BUN SERPL-MCNC: 14 MG/DL (ref 8–23)
BUN/CREAT SERPL: 13 (ref 9–20)
CALCIUM SERPL-MCNC: 9.7 MG/DL (ref 8.6–10.4)
CHLORIDE SERPL-SCNC: 105 MMOL/L (ref 98–107)
CHOLEST SERPL-MCNC: 99 MG/DL (ref 0–199)
CHOLESTEROL/HDL RATIO: 3
CO2 SERPL-SCNC: 29 MMOL/L (ref 20–31)
CREAT SERPL-MCNC: 1.1 MG/DL (ref 0.7–1.2)
EST. AVERAGE GLUCOSE BLD GHB EST-MCNC: 137 MG/DL
GFR, ESTIMATED: 74 ML/MIN/1.73M2
GLUCOSE SERPL-MCNC: 125 MG/DL (ref 70–99)
HBA1C MFR BLD: 6.4 % (ref 4–6)
HDLC SERPL-MCNC: 29 MG/DL
LDLC SERPL CALC-MCNC: 31 MG/DL (ref 0–100)
POTASSIUM SERPL-SCNC: 3.5 MMOL/L (ref 3.7–5.3)
SODIUM SERPL-SCNC: 142 MMOL/L (ref 135–144)
TRIGL SERPL-MCNC: 197 MG/DL
VLDLC SERPL CALC-MCNC: 39 MG/DL

## 2024-05-01 PROCEDURE — G8417 CALC BMI ABV UP PARAM F/U: HCPCS | Performed by: FAMILY MEDICINE

## 2024-05-01 PROCEDURE — 3074F SYST BP LT 130 MM HG: CPT | Performed by: FAMILY MEDICINE

## 2024-05-01 PROCEDURE — 99204 OFFICE O/P NEW MOD 45 MIN: CPT | Performed by: FAMILY MEDICINE

## 2024-05-01 PROCEDURE — 80048 BASIC METABOLIC PNL TOTAL CA: CPT

## 2024-05-01 PROCEDURE — G8427 DOCREV CUR MEDS BY ELIG CLIN: HCPCS | Performed by: FAMILY MEDICINE

## 2024-05-01 PROCEDURE — 83036 HEMOGLOBIN GLYCOSYLATED A1C: CPT

## 2024-05-01 PROCEDURE — 1036F TOBACCO NON-USER: CPT | Performed by: FAMILY MEDICINE

## 2024-05-01 PROCEDURE — 36415 COLL VENOUS BLD VENIPUNCTURE: CPT

## 2024-05-01 PROCEDURE — 80061 LIPID PANEL: CPT

## 2024-05-01 PROCEDURE — 3078F DIAST BP <80 MM HG: CPT | Performed by: FAMILY MEDICINE

## 2024-05-01 PROCEDURE — 3017F COLORECTAL CA SCREEN DOC REV: CPT | Performed by: FAMILY MEDICINE

## 2024-05-01 PROCEDURE — 1123F ACP DISCUSS/DSCN MKR DOCD: CPT | Performed by: FAMILY MEDICINE

## 2024-05-01 RX ORDER — HYDROCORTISONE 10 MG/1
TABLET ORAL
COMMUNITY
Start: 2023-11-02 | End: 2024-05-01

## 2024-05-01 RX ORDER — PANTOPRAZOLE SODIUM 40 MG/1
40 TABLET, DELAYED RELEASE ORAL
Qty: 30 TABLET | Refills: 0 | Status: SHIPPED | OUTPATIENT
Start: 2024-05-01

## 2024-05-01 SDOH — ECONOMIC STABILITY: FOOD INSECURITY: WITHIN THE PAST 12 MONTHS, YOU WORRIED THAT YOUR FOOD WOULD RUN OUT BEFORE YOU GOT MONEY TO BUY MORE.: NEVER TRUE

## 2024-05-01 SDOH — ECONOMIC STABILITY: FOOD INSECURITY: WITHIN THE PAST 12 MONTHS, THE FOOD YOU BOUGHT JUST DIDN'T LAST AND YOU DIDN'T HAVE MONEY TO GET MORE.: NEVER TRUE

## 2024-05-01 SDOH — ECONOMIC STABILITY: HOUSING INSECURITY
IN THE LAST 12 MONTHS, WAS THERE A TIME WHEN YOU DID NOT HAVE A STEADY PLACE TO SLEEP OR SLEPT IN A SHELTER (INCLUDING NOW)?: NO

## 2024-05-01 SDOH — ECONOMIC STABILITY: INCOME INSECURITY: HOW HARD IS IT FOR YOU TO PAY FOR THE VERY BASICS LIKE FOOD, HOUSING, MEDICAL CARE, AND HEATING?: NOT HARD AT ALL

## 2024-05-01 ASSESSMENT — PATIENT HEALTH QUESTIONNAIRE - PHQ9
SUM OF ALL RESPONSES TO PHQ QUESTIONS 1-9: 2
1. LITTLE INTEREST OR PLEASURE IN DOING THINGS: SEVERAL DAYS
SUM OF ALL RESPONSES TO PHQ9 QUESTIONS 1 & 2: 2
SUM OF ALL RESPONSES TO PHQ QUESTIONS 1-9: 2
2. FEELING DOWN, DEPRESSED OR HOPELESS: SEVERAL DAYS

## 2024-05-01 NOTE — PATIENT INSTRUCTIONS
[employer] SURVEY:    You may be receiving a survey from [employer] regarding your visit today.    You may get this in the mail, through your MyChart or in your email.     Please complete the survey to enable us to provide the highest quality of care to you and your family.    If you cannot score us as very good ( 5 Stars) on any question, please feel free to call the office to discuss how we could have made your experience exceptional.     Thank you.    Clinical Care Team:   DO Sunday Tran CMA                                     Triage: Yadi Heller CMA              Clerical Team:    Yadi Roche     [place] [phone]           Billing questions: [phone]           Medical Records Request: [phone]

## 2024-05-01 NOTE — PROGRESS NOTES
Name: Jomar Norwood  : 1955         Chief Complaint:     Chief Complaint   Patient presents with    New Patient    Cough     Cough since March    Gastroesophageal Reflux     Hasn't been taking omeprazole       History of Present Illness:      Jomar Norwood is a 68 y.o.  male who presents with New Patient, Cough (Cough since March), and Gastroesophageal Reflux (Hasn't been taking omeprazole)      HPI    New pt presents with wife c/o cough for approx 3 mos. Recalls in January was around grandchildren who were coughing and sniffling. Has still been coughing, sometimes in spells. May wheeze sometimes. Has tried some otc meds with temporary relief. No SOB or fever.    Chronic stomach issues, gets gas right after eating. Initially thought r/t milk but it's happening after intake of other food/drink also, unsure of certain triggers as it happens after most things he eats. BM's typically daily, takes miralax with dinner daily. Occasional diarrhea. No heartburn. Had been on omeprazole for a while as recommended by wife, unsure whether it helped but stopped taking it because he was doing better.    Acromegaly dx in his 20s, had pituitary resection and then was on sandostatin for a long time. Sees endo yearly and has labs. At some point pt had c/o fatigue and was put on hydrocortisone but then stopped taking it a couple mos ago d/t frustration with weight gain.     HTN - has been on meds and in Oct endo changed hydralazine from TID to just nightly d/t low BP during the day. Lopressor 25 mg every morning.     Had been dx borderline diabetic and was on metformin in the past.     Pt had worked at [employer], went on disability after second knee replacement.    Medical History:     Patient Active Problem List   Diagnosis    Ureteral calculus    Retinal detachment of left eye with multiple breaks    Renal stones    Acromegaly (HCC)    Essential hypertension    Hypopituitarism (HCC)    Sleep apnea    BPH with

## 2024-05-02 ENCOUNTER — TELEPHONE (OUTPATIENT)
Dept: FAMILY MEDICINE CLINIC | Age: 69
End: 2024-05-02

## 2024-05-02 NOTE — TELEPHONE ENCOUNTER
----- Message from Zoila Monroy, DO sent at 5/2/2024  8:01 AM EDT -----  Still in prediabetes range just under diabetic range. Keep checking q 6 mos. Cholesterol is actually even lower than desired so I recommend cutting lovastatin to 20 mg daily. Please see if he needs refills. Potassium is a little low also so I do recommend restarting supplement (20 meq) but just once a day.

## 2024-05-21 RX ORDER — PANTOPRAZOLE SODIUM 40 MG/1
40 TABLET, DELAYED RELEASE ORAL
Qty: 90 TABLET | Refills: 1 | Status: SHIPPED | OUTPATIENT
Start: 2024-05-21

## 2024-05-21 NOTE — TELEPHONE ENCOUNTER
Last OV: 5/1/2024  NP   Last RX:    Next scheduled apt: 6/3/2024   ONE MONTH         [facility] requesting a refill   Medication pending for approval

## 2024-05-23 ENCOUNTER — OFFICE VISIT (OUTPATIENT)
Dept: UROLOGY | Age: 69
End: 2024-05-23
Payer: MEDICARE

## 2024-05-23 VITALS
BODY MASS INDEX: 44.1 KG/M2 | TEMPERATURE: 98.6 F | DIASTOLIC BLOOD PRESSURE: 85 MMHG | WEIGHT: 315 LBS | HEIGHT: 71 IN | SYSTOLIC BLOOD PRESSURE: 123 MMHG

## 2024-05-23 DIAGNOSIS — R39.15 URGENCY OF URINATION: ICD-10-CM

## 2024-05-23 DIAGNOSIS — N39.41 URGE INCONTINENCE: ICD-10-CM

## 2024-05-23 DIAGNOSIS — N13.8 BPH WITH OBSTRUCTION/LOWER URINARY TRACT SYMPTOMS: ICD-10-CM

## 2024-05-23 DIAGNOSIS — N40.1 BPH WITH OBSTRUCTION/LOWER URINARY TRACT SYMPTOMS: ICD-10-CM

## 2024-05-23 DIAGNOSIS — N32.81 OAB (OVERACTIVE BLADDER): Primary | ICD-10-CM

## 2024-05-23 PROCEDURE — 51798 US URINE CAPACITY MEASURE: CPT | Performed by: NURSE PRACTITIONER

## 2024-05-23 RX ORDER — TAMSULOSIN HYDROCHLORIDE 0.4 MG/1
0.4 CAPSULE ORAL 2 TIMES DAILY
Qty: 180 CAPSULE | Refills: 3 | Status: SHIPPED | OUTPATIENT
Start: 2024-05-23

## 2024-05-23 RX ORDER — TESTOSTERONE GEL, 1% 10 MG/G
GEL TRANSDERMAL
COMMUNITY
Start: 2024-05-15

## 2024-05-23 RX ORDER — OXYBUTYNIN CHLORIDE 10 MG/1
10 TABLET, EXTENDED RELEASE ORAL DAILY
Qty: 90 TABLET | Refills: 3 | Status: SHIPPED | OUTPATIENT
Start: 2024-05-23

## 2024-05-23 NOTE — PROGRESS NOTES
History  Acromegalia - follows with endocrinology    3/2017 right HLL for 8mm ureteral stone    11/2020 Right ESWL for a 7mm right renal stone    3/2022 urgency and intermittent urge incontinence   Flomax    3/2023 urgency and intermittent urge incontinence  Flomax increased to twice a day    3/2024 urgency and urge incontinence   Cysto showed minimal bilobar hyperplasia, high riding bladder neck     Started oxybutynin daily    Today  Here today to follow-up for BPH and overactive bladder.  He is taking Flomax twice per day.  We did start him on oxybutynin 10 mg extended release at his last visit.  He is very happy with his symptom relief.  He denies frequency, urgency, nocturia or incontinence.  PVR is low.  He does have dry mouth since starting the oxybutynin, but states that this is not bothersome.    Plan  Continue Flomax twice per day    Continue oxybutynin daily    Follow-up in March with a KUB and PSA prior or sooner if needed

## 2024-06-03 ENCOUNTER — OFFICE VISIT (OUTPATIENT)
Dept: FAMILY MEDICINE CLINIC | Age: 69
End: 2024-06-03
Payer: MEDICARE

## 2024-06-03 VITALS
HEART RATE: 74 BPM | SYSTOLIC BLOOD PRESSURE: 108 MMHG | BODY MASS INDEX: 44.1 KG/M2 | DIASTOLIC BLOOD PRESSURE: 78 MMHG | WEIGHT: 315 LBS | HEIGHT: 71 IN | OXYGEN SATURATION: 91 %

## 2024-06-03 DIAGNOSIS — M54.50 CHRONIC BILATERAL LOW BACK PAIN WITHOUT SCIATICA: ICD-10-CM

## 2024-06-03 DIAGNOSIS — R53.82 CHRONIC FATIGUE: ICD-10-CM

## 2024-06-03 DIAGNOSIS — G89.29 CHRONIC BILATERAL LOW BACK PAIN WITHOUT SCIATICA: ICD-10-CM

## 2024-06-03 DIAGNOSIS — E23.0 HYPOPITUITARISM (HCC): ICD-10-CM

## 2024-06-03 DIAGNOSIS — L57.0 ACTINIC KERATOSIS: ICD-10-CM

## 2024-06-03 DIAGNOSIS — Z00.00 INITIAL MEDICARE ANNUAL WELLNESS VISIT: Primary | ICD-10-CM

## 2024-06-03 DIAGNOSIS — K29.00 ACUTE SUPERFICIAL GASTRITIS WITHOUT HEMORRHAGE: ICD-10-CM

## 2024-06-03 PROCEDURE — 3078F DIAST BP <80 MM HG: CPT | Performed by: FAMILY MEDICINE

## 2024-06-03 PROCEDURE — G8417 CALC BMI ABV UP PARAM F/U: HCPCS | Performed by: FAMILY MEDICINE

## 2024-06-03 PROCEDURE — G0438 PPPS, INITIAL VISIT: HCPCS | Performed by: FAMILY MEDICINE

## 2024-06-03 PROCEDURE — 17000 DESTRUCT PREMALG LESION: CPT | Performed by: FAMILY MEDICINE

## 2024-06-03 PROCEDURE — 1123F ACP DISCUSS/DSCN MKR DOCD: CPT | Performed by: FAMILY MEDICINE

## 2024-06-03 PROCEDURE — 99214 OFFICE O/P EST MOD 30 MIN: CPT | Performed by: FAMILY MEDICINE

## 2024-06-03 PROCEDURE — 3074F SYST BP LT 130 MM HG: CPT | Performed by: FAMILY MEDICINE

## 2024-06-03 PROCEDURE — G8427 DOCREV CUR MEDS BY ELIG CLIN: HCPCS | Performed by: FAMILY MEDICINE

## 2024-06-03 PROCEDURE — 3017F COLORECTAL CA SCREEN DOC REV: CPT | Performed by: FAMILY MEDICINE

## 2024-06-03 PROCEDURE — 1036F TOBACCO NON-USER: CPT | Performed by: FAMILY MEDICINE

## 2024-06-03 RX ORDER — LEVOTHYROXINE SODIUM 175 UG/1
175 TABLET ORAL DAILY
COMMUNITY

## 2024-06-03 SDOH — HEALTH STABILITY: PHYSICAL HEALTH: ON AVERAGE, HOW MANY DAYS PER WEEK DO YOU ENGAGE IN MODERATE TO STRENUOUS EXERCISE (LIKE A BRISK WALK)?: 0 DAYS

## 2024-06-03 ASSESSMENT — PATIENT HEALTH QUESTIONNAIRE - PHQ9
1. LITTLE INTEREST OR PLEASURE IN DOING THINGS: NOT AT ALL
SUM OF ALL RESPONSES TO PHQ QUESTIONS 1-9: 0
2. FEELING DOWN, DEPRESSED OR HOPELESS: NOT AT ALL
SUM OF ALL RESPONSES TO PHQ9 QUESTIONS 1 & 2: 0
SUM OF ALL RESPONSES TO PHQ QUESTIONS 1-9: 0

## 2024-06-03 ASSESSMENT — LIFESTYLE VARIABLES
HOW MANY STANDARD DRINKS CONTAINING ALCOHOL DO YOU HAVE ON A TYPICAL DAY: 1 OR 2
HOW OFTEN DO YOU HAVE SIX OR MORE DRINKS ON ONE OCCASION: 1
HOW OFTEN DO YOU HAVE A DRINK CONTAINING ALCOHOL: MONTHLY OR LESS
HOW MANY STANDARD DRINKS CONTAINING ALCOHOL DO YOU HAVE ON A TYPICAL DAY: 1
HOW OFTEN DO YOU HAVE A DRINK CONTAINING ALCOHOL: 2

## 2024-06-03 NOTE — PROGRESS NOTES
Medicare Annual Wellness Visit    Jomar Norwood is here for Medicare AWV and Abdominal Pain (improving)    Assessment & Plan   Initial Medicare annual wellness visit    Recommendations for Preventive Services Due: see orders and patient instructions/AVS.  Recommended screening schedule for the next 5-10 years is provided to the patient in written form: see Patient Instructions/AVS.     No follow-ups on file.     Subjective       Patient's complete Health Risk Assessment and screening values have been reviewed and are found in Flowsheets. The following problems were reviewed today and where indicated follow up appointments were made and/or referrals ordered.    Positive Risk Factor Screenings with Interventions:                Activity, Diet, and Weight:  On average, how many days per week do you engage in moderate to strenuous exercise (like a brisk walk)?: 0 days       Do you eat balanced/healthy meals regularly?: Yes    Body mass index is 44.65 kg/m². (!) Abnormal    Obesity Interventions:  Patient declines any further evaluation or treatment          Dentist Screen:  Have you seen the dentist within the past year?: (!) No    Intervention:  Patient declines any further evaluation or treatment        Advanced Directives:  Do you have a Living Will?: (!) No    Intervention:  has NO advanced directive - not interested in additional information                     Objective   Vitals:    06/03/24 1448   BP: 108/78   Pulse: 74   SpO2: 91%   Weight: (!) 145.2 kg (320 lb)   Height: 1.803 m (5' 10.98\")      Body mass index is 44.65 kg/m².             No Known Allergies  Prior to Visit Medications    Medication Sig Taking? Authorizing Provider   levothyroxine (SYNTHROID) 175 MCG tablet Take 1 tablet by mouth Daily Yes Provider, MD Diamond   testosterone (ANDROGEL; TESTIM) 50 MG/5GM (1%) GEL 1% gel apply 2 packets topically every morning to shoulder or UPPER ARM ...  (REFER TO PRESCRIPTION NOTES). Yes Provider

## 2024-06-03 NOTE — PROGRESS NOTES
Name: Jomar Norwood  : 1955         Chief Complaint:     Chief Complaint   Patient presents with    Medicare AWV    Abdominal Pain     improving       History of Present Illness:      Jomar Norwood is a 68 y.o.  male who presents with Medicare AWV and Abdominal Pain (improving)      HPI    F/u abd bloating. Feeling quite a bit better since starting protonix after last visit. Has had 2 days of feeling poorly, bad nausea after eating breakfast in the morning which was bagel with cream cheese and [place]. Didn't try any certain tx for it. Gas has improved. One of the days it happened he hadn't had any time between protonix and eating. Otherwise waits at least 30m between med and food.    Fatigue - states much worse after he had been on hydrocortisone and gained 35#. Stopped hydrocortisone. [facility] Wife will call robert in [place] to see about visit.     preDM - no particular diet. Exercise limited d/t back pain, fatigue.    Chronic low back pain - very bothersome with activity incl walking. Sees chiro periodically, takes otc meds prn.     Skin lesion L posterior forearm still present but less thick since he stopped picking at it.    Medical History:     Patient Active Problem List   Diagnosis    Ureteral calculus    Retinal detachment of left eye with multiple breaks    Renal stones    Acromegaly (HCC)    Essential hypertension    Hypopituitarism (HCC)    Sleep apnea    BPH with obstruction/lower urinary tract symptoms    Dyslipidemia    Seasonal allergies    Orthostatic hypotension       Medications:       Prior to Admission medications    Medication Sig Start Date End Date Taking? Authorizing Provider   levothyroxine (SYNTHROID) 175 MCG tablet Take 1 tablet by mouth Daily   Yes Provider, MD Diamond   testosterone (ANDROGEL; TESTIM) 50 MG/5GM (1%) GEL 1% gel apply 2 packets topically every morning to shoulder or UPPER ARM ...  (REFER TO

## 2024-06-07 ENCOUNTER — HOSPITAL ENCOUNTER (OUTPATIENT)
Dept: GENERAL RADIOLOGY | Age: 69
End: 2024-06-07
Payer: MEDICARE

## 2024-06-07 ENCOUNTER — HOSPITAL ENCOUNTER (OUTPATIENT)
Age: 69
End: 2024-06-07
Payer: MEDICARE

## 2024-06-07 DIAGNOSIS — M54.50 CHRONIC BILATERAL LOW BACK PAIN WITHOUT SCIATICA: ICD-10-CM

## 2024-06-07 DIAGNOSIS — G89.29 CHRONIC BILATERAL LOW BACK PAIN WITHOUT SCIATICA: ICD-10-CM

## 2024-06-07 PROCEDURE — 72110 X-RAY EXAM L-2 SPINE 4/>VWS: CPT

## 2024-06-10 ENCOUNTER — HOSPITAL ENCOUNTER (OUTPATIENT)
Dept: PHYSICAL THERAPY | Age: 69
Setting detail: THERAPIES SERIES
Discharge: HOME | End: 2024-06-10
Attending: FAMILY MEDICINE
Payer: MEDICARE

## 2024-06-10 ENCOUNTER — TELEPHONE (OUTPATIENT)
Dept: FAMILY MEDICINE CLINIC | Age: 69
End: 2024-06-10

## 2024-06-10 DIAGNOSIS — M54.50 CHRONIC BILATERAL LOW BACK PAIN WITHOUT SCIATICA: Primary | ICD-10-CM

## 2024-06-10 DIAGNOSIS — G89.29 CHRONIC BILATERAL LOW BACK PAIN WITHOUT SCIATICA: Primary | ICD-10-CM

## 2024-06-10 DIAGNOSIS — R93.7 ABNORMAL X-RAY OF LUMBAR SPINE: ICD-10-CM

## 2024-06-10 PROCEDURE — 97110 THERAPEUTIC EXERCISES: CPT

## 2024-06-10 PROCEDURE — 97162 PT EVAL MOD COMPLEX 30 MIN: CPT

## 2024-06-10 ASSESSMENT — PAIN SCALES - GENERAL: PAINLEVEL_OUTOF10: 3

## 2024-06-10 NOTE — PLAN OF CARE
[facility]       Phone: [phone]   Date: 6/10/2024                      Outpatient Physical Therapy  Fax: [phone]    [id number]                     Plan of Care  [facility]#: [id number]  Patient: Jomar Norwood  : 1955    Referring Provider (secondary): Porfirio    Diagnosis: Chronic bilateral low back pain without sciatica  Onset Date: 23  Treatment Diagnosis: Lower back pain    Assessment  Body Structures, Functions, Activity Limitations Requiring Skilled Therapeutic Intervention: Decreased ADL status, Decreased functional mobility , Decreased ROM, Decreased body mechanics, Decreased tolerance to work activity, Decreased strength, Increased pain, Decreased posture  Assessment: Patient presents with c/o chronic, worsening lower back pain.  Significant OA shown on x-rays.  His clinical presentation re-affirms this diagnosis.  Will start with core strengthening and flexibility.  Consider traction if he does not progress, however, patient is not comfortable laying supine.  May benefit from aquatic therapy if land base fails.  Therapy Prognosis: Good    Treatment Plan   Days: 2 times per week Weeks: 4 weeks Total # of Visits Approved: 8    Patient Education/HEP, Back Education, Therapeutic Exercise, Manual Therapy: Myofacial Release/Cupping, Manual Therapy: Mobilization/Manipulation, Neuro Re-ed, Aquatics, Traction, HP/CP, Electrical Stimulation, and Therapeutic Activity     Goals  Short Term Goals  Time Frame for Short Term Goals: 4 visits  Short Term Goal 1: Patient will demonstrate compliance with HEP to optimize therapy progress  Long Term Goals  Time Frame for Long Term Goals : 8 visits  Long Term Goal 1: Patient will improve lumbar extension and rotation limitation to 50% to improve functional mobility  Long Term Goal 2: Patient will improve core strength to allow supine to sit transfer without assist  Long Term Goal 3: Patient will improve Oswestry score from 50 to

## 2024-06-10 NOTE — TELEPHONE ENCOUNTER
----- Message from Zoila Monroy DO sent at 6/9/2024 10:02 PM EDT -----  Severe arthritis, lots of bone spurs which could cause narrowing around the spinal cord. That's something called spinal stenosis and can cause a lot of pain and radiation into legs with walking. Recommend MRI for further eval.

## 2024-06-10 NOTE — THERAPY EVALUATION
Phone: [phone]                       [facility]         Fax: [phone]                      Outpatient Physical Therapy                                                                      Evaluation    Date: 6/10/2024  Patient: Jomar Norwood  : 1955  ACCT #: [id number]    Referring Provider (secondary): Porfirio    Diagnosis: Chronic bilateral low back pain without sciatica    Treatment Diagnosis: Lower back pain  Onset Date: 23  PT Insurance Information: Medicare  Total # of Visits Approved: 8 Per Physician Order  Total # of Visits to Date: 1     Subjective     Additional Pertinent Hx: Patient presents with c/o chronic lower back pain. Over the last 16 month it has gotten worse.  Insidious onset.  He is having difficulty with transfers due to multiple other surgeries and his back pain.  He has not had surgery on his back.  He did try injections in the back \"I had the nerves numbed.  Some relief, but not enough.\"  Last one was about 1 year ago.  His pain is constant.  Worse with prolonged standing and walking. Pain increases with cough and sneeze.  No change in pain with position changes.  He does not work, has been on disability.  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 Takes Ibuprofen PRN for pain control.  Has tried chiropractic for years, typically gets about 1 day of relief after.  PMHx: Arthritis     Hyperlipidemia     Hypertension     Thyroid disease, bilateral TKR, right KOLE, bilateral TSA,  Pain Assessment  Pain Level: 3          Objective        Spine  Lumbar: Flexion:nil limitation.  Extension 75% limited.  Rotation bilateral 50% limited.  SB bilateral 75% limited  Strength RLE  R Hip Flexion: 4/5  R Knee Flexion: 5/5  R Knee Extension: 4+/5  R Ankle Dorsiflexion: 5/5  R Ankle Plantar flexion: 5/5  Strength LLE  L Hip Flexion: 4/5  L Knee Flexion: 5/5  L Knee Extension: 5/5  L Ankle Dorsiflexion: 5/5  L Ankle Plantar Flexion: 5/5

## 2024-06-18 ENCOUNTER — HOSPITAL ENCOUNTER (OUTPATIENT)
Dept: PHYSICAL THERAPY | Age: 69
Setting detail: THERAPIES SERIES
Discharge: HOME | End: 2024-06-18
Attending: FAMILY MEDICINE
Payer: COMMERCIAL

## 2024-06-18 PROCEDURE — 97110 THERAPEUTIC EXERCISES: CPT

## 2024-06-18 ASSESSMENT — PAIN SCALES - GENERAL: PAINLEVEL_OUTOF10: 4

## 2024-06-18 NOTE — PROGRESS NOTES
Time Out : 1320   Timed Code Treatment Minutes: 35 Minutes  Total Treatment Time: 35 Minutes    Shaun Acuna, PTA     Date: 6/18/2024

## 2024-06-20 ENCOUNTER — HOSPITAL ENCOUNTER (OUTPATIENT)
Dept: PHYSICAL THERAPY | Age: 69
Setting detail: THERAPIES SERIES
Discharge: HOME | End: 2024-06-20
Attending: FAMILY MEDICINE
Payer: COMMERCIAL

## 2024-06-20 PROCEDURE — 97110 THERAPEUTIC EXERCISES: CPT

## 2024-06-20 ASSESSMENT — PAIN SCALES - GENERAL: PAINLEVEL_OUTOF10: 4

## 2024-06-20 NOTE — PROGRESS NOTES
Phone: [phone]                 [facility]      Fax: [phone]                            Outpatient Physical Therapy                                                                            Daily Note    Date: 2024  Patient Name: Jomar Norwood        MRN: [id number]   ACCT#:  [id number]  : 1955  (68 y.o.)    Referring Provider (secondary): Porfirio         Diagnosis: Chronic bilateral low back pain without sciatica  Treatment Diagnosis: Lower back pain    Onset Date: 23  PT Insurance Information: Medicare  Total # of Visits Approved: 8 Per Physician Order  Total # of Visits to Date: 3  Plan of Care/Certification Expiration Date: 24    Pre-Treatment Pain:  3-4/10     Assessment  Assessment: Patient states low back pain is a 3-4/10 this afternoon. Patient notes he didn't feel too bad following last session. Progressed with Shuttle this afternoon with good tolerance from patient. Patient continues to have difficulty with supine to sit to R side. Post session patient notes pain decreased to  2/10.    Plan  Continue with current plan of care    Exercises/Modalities/Manual:  See DocFlow Sheet    Education: HEP     Goals  (Total # of Visits to Date: 3)   Short Term Goals  Time Frame for Short Term Goals: 4 visits  Short Term Goal 1: Patient will demonstrate compliance with HEP to optimize therapy progress - MET    Long Term Goals  Time Frame for Long Term Goals : 8 visits  Long Term Goal 1: Patient will improve lumbar extension and rotation limitation to 50% to improve functional mobility  Long Term Goal 2: Patient will improve core strength to allow supine to sit transfer without assist  Long Term Goal 3: Patient will improve Oswestry score from 18/50 to 9/50 to demonstrate functional improvement    Post Treatment Pain:  2/10    Time In: 1245    Time Out : 1320   Timed Code Treatment Minutes: 35 Minutes  Total Treatment Time: 35 Minutes    Shaun Acuna PTA     Date: 2024

## 2024-06-25 ENCOUNTER — HOSPITAL ENCOUNTER (OUTPATIENT)
Dept: PHYSICAL THERAPY | Age: 69
Setting detail: THERAPIES SERIES
Discharge: HOME | End: 2024-06-25
Attending: FAMILY MEDICINE
Payer: MEDICARE

## 2024-06-25 PROCEDURE — 97110 THERAPEUTIC EXERCISES: CPT

## 2024-06-25 ASSESSMENT — PAIN SCALES - GENERAL: PAINLEVEL_OUTOF10: 3

## 2024-06-25 NOTE — PROGRESS NOTES
Phone: [phone]                 [facility]      Fax: [phone]                            Outpatient Physical Therapy                                                                            Daily Note    Date: 2024  Patient Name: Jomar Norwood        MRN: [id number]   ACCT#:  [id number]  : 1955  (68 y.o.)    Referring Provider (secondary): Porfirio         Diagnosis: Chronic bilateral low back pain without sciatica  Treatment Diagnosis: Lower back pain    Onset Date: 23  PT Insurance Information: Medicare  Total # of Visits Approved: 8 Per Physician Order  Total # of Visits to Date: 4  Plan of Care/Certification Expiration Date: 24    Pre-Treatment Pain:  3/10     Assessment  Assessment: Patient states LBP is a 3/10 this afternoon. Patient notes his back continues to be up and down. He does note he is unable to stand for an extended period of time. He notes he is unable to stand for more than 5-10 minutes when working in his garden. Completed strengthening and stretching exercises per flowsheet. Post session patient notes pain decreased to a 1-2/10. Issued patient tband to complete hip and postural strengthening exercises.    Plan  Continue with current plan of care    Exercises/Modalities/Manual:  See DocFlow Sheet    Education: HEP     Goals  (Total # of Visits to Date: 4)   Short Term Goals  Time Frame for Short Term Goals: 4 visits  Short Term Goal 1: Patient will demonstrate compliance with HEP to optimize therapy progress - MET    Long Term Goals  Time Frame for Long Term Goals : 8 visits  Long Term Goal 1: Patient will improve lumbar extension and rotation limitation to 50% to improve functional mobility  Long Term Goal 2: Patient will improve core strength to allow supine to sit transfer without assist  Long Term Goal 3: Patient will improve Oswestry score from 18/50 to 9/50 to demonstrate functional improvement    Post Treatment Pain:  1-2/10    Time In: 7332

## 2024-06-26 ENCOUNTER — HOSPITAL ENCOUNTER (OUTPATIENT)
Dept: MRI IMAGING | Age: 69
Discharge: HOME | End: 2024-06-28
Attending: FAMILY MEDICINE
Payer: MEDICARE

## 2024-06-26 DIAGNOSIS — M54.50 CHRONIC BILATERAL LOW BACK PAIN WITHOUT SCIATICA: ICD-10-CM

## 2024-06-26 DIAGNOSIS — R93.7 ABNORMAL X-RAY OF LUMBAR SPINE: ICD-10-CM

## 2024-06-26 DIAGNOSIS — G89.29 CHRONIC BILATERAL LOW BACK PAIN WITHOUT SCIATICA: ICD-10-CM

## 2024-06-26 PROCEDURE — 72148 MRI LUMBAR SPINE W/O DYE: CPT

## 2024-06-27 ENCOUNTER — HOSPITAL ENCOUNTER (OUTPATIENT)
Dept: PHYSICAL THERAPY | Age: 69
Setting detail: THERAPIES SERIES
Discharge: HOME | End: 2024-06-27
Attending: FAMILY MEDICINE
Payer: MEDICARE

## 2024-06-27 PROCEDURE — 97110 THERAPEUTIC EXERCISES: CPT

## 2024-06-27 ASSESSMENT — PAIN SCALES - GENERAL: PAINLEVEL_OUTOF10: 4

## 2024-06-27 NOTE — PROGRESS NOTES
Phone: [phone]                 [facility]           Fax: [phone]                            Outpatient Physical Therapy                                                                            Daily Note    Date: 2024  Patient Name: Jomar Norwood        MRN: [id number]   ACCT#:  [id number]  : 1955  (68 y.o.)    Referring Provider (secondary): Porfirio         Diagnosis: Chronic bilateral low back pain without sciatica  Treatment Diagnosis: Lower back pain    Onset Date: 23  PT Insurance Information: Medicare  Total # of Visits Approved: 8 Per Physician Order  Total # of Visits to Date: 5  Plan of Care/Certification Expiration Date: 24    Pre-Treatment Pain:  4/10     Assessment  Assessment: Patient reports 4/10 pain this day. States that following session on 24 he felt good after he left, but his back tightened up pretty quick later in the day. Reports that he is tolerating land-based therapy. Reports that he had an MRI completed yesterday.  Ball squat progressed to a free squat with B/L UE support on this date.  Hoist exercise were completed with relative ease during this session, look to progress number of plates during next visit. Following session patient states that his pain decresed to a 3/10 along with a feeling of loosening up.    Plan  Continue with current plan of care    Exercises/Modalities/Manual:  See DocFlow Sheet    Education: Education on progression from ball squat to a free squat with cues to keep the knees behind the toes and to try and keep the heels on the ground.           Goals  (Total # of Visits to Date: 5)   Short Term Goals -   Short Term Goals  Time Frame for Short Term Goals: 4 visits  Short Term Goal 1: Patient will demonstrate compliance with HEP to optimize therapy progress - MET    Long Term Goals -   Long Term Goals  Time Frame for Long Term Goals : 8 visits  Long Term Goal 1: Patient will improve lumbar extension and rotation

## 2024-06-28 ENCOUNTER — TELEPHONE (OUTPATIENT)
Dept: FAMILY MEDICINE CLINIC | Age: 69
End: 2024-06-28

## 2024-06-28 NOTE — TELEPHONE ENCOUNTER
----- Message from Zoila Monroy DO sent at 6/28/2024  2:15 PM EDT -----  Lumbar MRI showed lots of disc disease, disc bulging at multiple levels.  At one area this is causing moderate narrowing around the canal and some narrowing around the nerves on both sides.  The upper lumbar, which would be at the bottom of where his ribs are in the back, has some inflammation in the bones themselves.  Recommend seeing pain management being other options for pain but I do not think at this time that he needs to see a surgeon as he does not have any severe narrowing anywhere.

## 2024-07-01 ENCOUNTER — HOSPITAL ENCOUNTER (OUTPATIENT)
Dept: PHYSICAL THERAPY | Age: 69
Setting detail: THERAPIES SERIES
Discharge: HOME | End: 2024-07-01
Attending: FAMILY MEDICINE
Payer: MEDICARE

## 2024-07-01 PROCEDURE — 97012 MECHANICAL TRACTION THERAPY: CPT

## 2024-07-01 PROCEDURE — 97110 THERAPEUTIC EXERCISES: CPT

## 2024-07-01 ASSESSMENT — PAIN SCALES - GENERAL: PAINLEVEL_OUTOF10: 4

## 2024-07-01 NOTE — PROGRESS NOTES
Phone: [phone]                 [facility]      Fax: [phone]                            Outpatient Physical Therapy                                                                            Daily Note    Date: 2024  Patient Name: Jomar Norwood        MRN: [id number]   ACCT#:  [id number]  : 1955  (68 y.o.)    Referring Provider (secondary): Porfirio         Diagnosis: Chronic bilateral low back pain without sciatica  Treatment Diagnosis: Lower back pain    Onset Date: 23  PT Insurance Information: Medicare  Total # of Visits Approved: 8 Per Physician Order  Total # of Visits to Date: 6  Plan of Care/Certification Expiration Date: 24    Pre-Treatment Pain:  4/10  Subjective: 1258:  Pain remains about the same.  Tolerating treatments well, but no significant improvement in pain levels.  Assessment  Assessment: No change with traction today.  Will re-assess at next visit and possibly progress traction.  Will re-eval at end of POC for extension versus d/c.    Plan  Continue with current plan of care    Exercises/Modalities/Manual:  See DocFlow Sheet    Education: Education on traction expectations          Goals  (Total # of Visits to Date: 6)   Short Term Goals  Time Frame for Short Term Goals: 4 visits  Short Term Goal 1: Patient will demonstrate compliance with HEP to optimize therapy progress - MET    Long Term Goals  Time Frame for Long Term Goals : 8 visits  Long Term Goal 1: Patient will improve lumbar extension and rotation limitation to 50% to improve functional mobility  Long Term Goal 2: Patient will improve core strength to allow supine to sit transfer without assist  Long Term Goal 3: Patient will improve Oswestry score from 18/50 to 9/50 to demonstrate functional improvement    Post Treatment Pain:  4/10    Time In: 1258    Time Out : 1338        Timed Code Treatment Minutes: 30 Minutes  Total Treatment Time: 40 Minutes    Chris Martinez, PT     Date: 2024

## 2024-07-03 ENCOUNTER — HOSPITAL ENCOUNTER (OUTPATIENT)
Dept: PHYSICAL THERAPY | Age: 69
Setting detail: THERAPIES SERIES
Discharge: HOME | End: 2024-07-03
Attending: FAMILY MEDICINE
Payer: MEDICARE

## 2024-07-03 NOTE — PROGRESS NOTES
Physical Therapy  [facility]    Date: 7/3/2024  Patient Name: Jomar Norwood        : 1955       Pateint is not feeling well and will return on the next visit.      Rajani S Shock Date: 7/3/2024

## 2024-07-09 ENCOUNTER — HOSPITAL ENCOUNTER (OUTPATIENT)
Dept: PHYSICAL THERAPY | Age: 69
Setting detail: THERAPIES SERIES
Discharge: HOME | End: 2024-07-09
Attending: FAMILY MEDICINE
Payer: MEDICARE

## 2024-07-09 PROCEDURE — 97110 THERAPEUTIC EXERCISES: CPT

## 2024-07-09 PROCEDURE — 97012 MECHANICAL TRACTION THERAPY: CPT

## 2024-07-09 ASSESSMENT — PAIN SCALES - GENERAL: PAINLEVEL_OUTOF10: 4

## 2024-07-09 NOTE — PROGRESS NOTES
Phone: [phone]                 [facility]           Fax: [phone]                            Outpatient Physical Therapy                                                                            Daily Note    Date: 2024  Patient Name: Jomar Norwood        MRN: [id number]   ACCT#:  [id number]  : 1955  (68 y.o.)    Referring Provider (secondary): Porfirio         Diagnosis: Chronic bilateral low back pain without sciatica  Treatment Diagnosis: Lower back pain    Onset Date: 23  PT Insurance Information: Medicare  Total # of Visits Approved: 8 Per Physician Order  Total # of Visits to Date: 7  Plan of Care/Certification Expiration Date: 24    Pre-Treatment Pain:  4/10     Assessment  Assessment: Patient presents this day with 4/10 pain. Denies any pain/soreness following his last session. States that lumbar traction from last visit provided some mild short-term relief. Progressed traction pull to 105# today with mild relief directly following treatment. Following therapy patient states a small decrease in pain to a 3/10. His visit on 2024 will be his , reevaluate and determine potential need for additional visits on that date.    Plan  Continue with current plan of care    Exercises/Modalities/Manual:  See DocFlow Sheet    Education: Education on progression of traction pull and potential d/c or request for additional visits following conclusion of his next appointment.           Goals  (Total # of Visits to Date: 7)   Short Term Goals -   Short Term Goals  Time Frame for Short Term Goals: 4 visits  Short Term Goal 1: Patient will demonstrate compliance with HEP to optimize therapy progress - MET    Long Term Goals -   Long Term Goals  Time Frame for Long Term Goals : 8 visits  Long Term Goal 1: Patient will improve lumbar extension and rotation limitation to 50% to improve functional mobility  Long Term Goal 2: Patient will improve core strength to allow supine to

## 2024-07-16 ENCOUNTER — HOSPITAL ENCOUNTER (OUTPATIENT)
Dept: PHYSICAL THERAPY | Age: 69
Setting detail: THERAPIES SERIES
Discharge: HOME | End: 2024-07-16
Attending: FAMILY MEDICINE
Payer: MEDICARE

## 2024-07-16 PROCEDURE — 97012 MECHANICAL TRACTION THERAPY: CPT

## 2024-07-16 PROCEDURE — 97110 THERAPEUTIC EXERCISES: CPT

## 2024-07-16 ASSESSMENT — PAIN SCALES - GENERAL: PAINLEVEL_OUTOF10: 3

## 2024-07-16 NOTE — PROGRESS NOTES
Phone: [phone]                 [facility]      Fax: [phone]                            Outpatient Physical Therapy                                                                            Daily Note    Date: 2024  Patient Name: Jomar Norwood        MRN: [id number]   ACCT#:  [id number]  : 1955  (68 y.o.)    Referring Provider (secondary): Porfirio         Diagnosis: Chronic bilateral low back pain without sciatica  Treatment Diagnosis: Lower back pain    Onset Date: 23  PT Insurance Information: Medicare  Total # of Visits Approved: 8 Per Physician Order  Total # of Visits to Date: 8  Plan of Care/Certification Expiration Date: 24    Pre-Treatment Pain:  3/10     Assessment  Assessment: Patient states back pain is a 3/10 this afternoon. Continued with hip strengthening exercises and back stretching exercises per flowsheet. Concluded session with intermittent lumbar traction at 105# x10'. Post session patient notes LBP is a /10. Since beginning PT patient notes he is approx 15-20% better. Oswestry Score = 17/50. Patient is still 75% limited with lumbar extension. Plan to recert to reduce pain and improve lumbar ROM.    Plan  Continue with current plan of care    Exercises/Modalities/Manual:  See DocFlow Sheet    Education: POC     Goals  (Total # of Visits to Date: 8)   Short Term Goals  Time Frame for Short Term Goals: 4 visits  Short Term Goal 1: Patient will demonstrate compliance with HEP to optimize therapy progress - MET    Long Term Goals  Time Frame for Long Term Goals : 8 visits  Long Term Goal 1: Patient will improve lumbar extension and rotation limitation to 50% to improve functional mobility - NOT MET  Long Term Goal 2: Patient will improve core strength to allow supine to sit transfer without assist - NOT MET  Long Term Goal 3: Patient will improve Oswestry score from 18/50 to 9/50 to demonstrate functional improvement - NOT MET    Post Treatment Pain:

## 2024-07-24 ENCOUNTER — HOSPITAL ENCOUNTER (OUTPATIENT)
Dept: PHYSICAL THERAPY | Age: 69
Setting detail: THERAPIES SERIES
Discharge: HOME | End: 2024-07-24
Attending: FAMILY MEDICINE
Payer: MEDICARE

## 2024-07-24 PROCEDURE — 97012 MECHANICAL TRACTION THERAPY: CPT

## 2024-07-24 PROCEDURE — 97110 THERAPEUTIC EXERCISES: CPT

## 2024-07-24 NOTE — PROGRESS NOTES
Phone: [phone]                 [facility]      Fax: [phone]                            Outpatient Physical Therapy                                                                            Daily Note    Date: 2024  Patient Name: Jomar Norwood        MRN: [id number]   ACCT#:  [id number]  : 1955  (68 y.o.)    Referring Provider (secondary): Porfirio         Diagnosis: Chronic bilateral low back pain without sciatica  Treatment Diagnosis: Lower back pain    Onset Date: 23  PT Insurance Information: Medicare  Total # of Visits Approved: 12 Per Physician Order  Total # of Visits to Date: 9  Plan of Care/Certification Expiration Date: 24    Pre-Treatment Pain:  3/10     Assessment  Assessment: Patient reports back pain is a 3/10 this afternoon. Earlier this morning patient notes he was trimming some tree limbs which caused his pain to slightly increase. Patient notes he had to take a few rest breaks. Continued with exercises per flowsheet to improve postural and core strength. Concluded sessino with intermittent traction. Post traction patient notes back pain is a 3/10.    Plan  Continue with current plan of care    Exercises/Modalities/Manual:  See DocFlow Sheet    Education: HEP     Goals  (Total # of Visits to Date: 9)   Short Term Goals  Time Frame for Short Term Goals: 4 visits  Short Term Goal 1: Patient will demonstrate compliance with HEP to optimize therapy progress - MET    Long Term Goals  Time Frame for Long Term Goals : 12 visits  Long Term Goal 1: Patient will improve lumbar extension and rotation limitation to 50% to improve functional mobility - NOT MET  Long Term Goal 2: Patient will improve core strength to allow supine to sit transfer without assist - NOT MET  Long Term Goal 3: Patient will improve Oswestry score from 18/50 to 9/50 to demonstrate functional improvement - NOT MET    Post Treatment Pain:  3/10    Time In: 1245    Time Out : 1330   Timed Code

## 2024-07-30 ENCOUNTER — APPOINTMENT (OUTPATIENT)
Dept: PHYSICAL THERAPY | Age: 69
End: 2024-07-30
Attending: FAMILY MEDICINE
Payer: MEDICARE

## 2024-09-03 ENCOUNTER — OFFICE VISIT (OUTPATIENT)
Dept: FAMILY MEDICINE CLINIC | Age: 69
End: 2024-09-03

## 2024-09-03 VITALS
WEIGHT: 315 LBS | SYSTOLIC BLOOD PRESSURE: 110 MMHG | BODY MASS INDEX: 44.1 KG/M2 | OXYGEN SATURATION: 95 % | HEIGHT: 71 IN | HEART RATE: 74 BPM | DIASTOLIC BLOOD PRESSURE: 80 MMHG

## 2024-09-03 DIAGNOSIS — R10.9 BILATERAL FLANK PAIN: Primary | ICD-10-CM

## 2024-09-03 DIAGNOSIS — G89.29 CHRONIC BILATERAL LOW BACK PAIN WITHOUT SCIATICA: ICD-10-CM

## 2024-09-03 DIAGNOSIS — E23.0 HYPOPITUITARISM (HCC): ICD-10-CM

## 2024-09-03 DIAGNOSIS — M54.50 CHRONIC BILATERAL LOW BACK PAIN WITHOUT SCIATICA: ICD-10-CM

## 2024-09-03 RX ORDER — IBUPROFEN 600 MG/1
600 TABLET, FILM COATED ORAL EVERY 8 HOURS PRN
Qty: 180 TABLET | Refills: 1 | Status: CANCELLED | OUTPATIENT
Start: 2024-09-03

## 2024-09-03 RX ORDER — IBUPROFEN 800 MG/1
800 TABLET, FILM COATED ORAL 3 TIMES DAILY PRN
Qty: 270 TABLET | Refills: 1 | Status: SHIPPED | OUTPATIENT
Start: 2024-09-03

## 2024-09-03 RX ORDER — LOVASTATIN 20 MG
20 TABLET ORAL DAILY
Qty: 90 TABLET | Refills: 1 | Status: SHIPPED | OUTPATIENT
Start: 2024-09-03

## 2024-09-03 RX ORDER — VALSARTAN AND HYDROCHLOROTHIAZIDE 320; 25 MG/1; MG/1
TABLET, FILM COATED ORAL
COMMUNITY
Start: 2024-07-10

## 2024-09-03 NOTE — PROGRESS NOTES
Name: Jomar Norwood  : 1955         Chief Complaint:     Chief Complaint   Patient presents with    Hypertension    Sleep Apnea    Back Pain     Patient states that he takes his ibuprofen 600mg (2 at a time) for kidney pain       History of Present Illness:      Jomar Norwood is a 68 y.o.  male who presents with Hypertension, Sleep Apnea, and Back Pain (Patient states that he takes his ibuprofen 600mg (2 at a time) for kidney pain)      HPI    Abd pain, gas bothering him, sometimes diarrhea which can get bad enough to cause him to cancel plans for the day. Recently had been constipated, increased miralax dose and BM's are better now. Usually uses 1/2 cap daily, has made adjustments as appropriate. Seemed like protonix helped at first but not as much now.    New back pain over the weekend which reminds him of previous kidney stones. Bilateral and non radiating. Urine still looking normal. Taking motrin but not helping a whole lot. Pain starting to improve today. Had started gym end of July and he does a machine with back extension but that seems to activate upper abs more than anything.     Hypopituitarism - has endo appt in [place] 10/14 with Dr. Hemant Mcclain. Last few yrs had been having annual labs and visit with endo Dr Kim in [place] who recently retired.     Medical History:     Patient Active Problem List   Diagnosis    Ureteral calculus    Retinal detachment of left eye with multiple breaks    Renal stones    Acromegaly (HCC)    Essential hypertension    Hypopituitarism (HCC)    Sleep apnea    BPH with obstruction/lower urinary tract symptoms    Dyslipidemia    Seasonal allergies    Orthostatic hypotension       Medications:       Prior to Admission medications    Medication Sig Start Date End Date Taking? Authorizing Provider   valsartan-hydroCHLOROthiazide (DIOVAN-HCT) 320-25 MG per tablet  7/10/24  Yes Provider, MD Diamond   lovastatin (MEVACOR) 20 MG tablet Take 1 tablet by

## 2024-09-06 ENCOUNTER — PATIENT MESSAGE (OUTPATIENT)
Dept: FAMILY MEDICINE CLINIC | Age: 69
End: 2024-09-06

## 2024-09-06 DIAGNOSIS — E22.0 ACROMEGALY (HCC): ICD-10-CM

## 2024-09-06 DIAGNOSIS — E23.0 HYPOPITUITARISM (HCC): Primary | ICD-10-CM

## 2024-09-06 DIAGNOSIS — R73.03 PREDIABETES: ICD-10-CM

## 2024-10-08 ENCOUNTER — HOSPITAL ENCOUNTER (OUTPATIENT)
Age: 69
Discharge: HOME | End: 2024-10-08
Payer: MEDICARE

## 2024-10-08 DIAGNOSIS — E22.0 ACROMEGALY (HCC): ICD-10-CM

## 2024-10-08 DIAGNOSIS — R73.03 PREDIABETES: ICD-10-CM

## 2024-10-08 DIAGNOSIS — E23.0 HYPOPITUITARISM (HCC): ICD-10-CM

## 2024-10-08 LAB
ALBUMIN SERPL-MCNC: 3.9 G/DL (ref 3.5–5.2)
ALP SERPL-CCNC: 66 U/L (ref 40–129)
ALT SERPL-CCNC: 47 U/L (ref 5–41)
ANION GAP SERPL CALCULATED.3IONS-SCNC: 9 MMOL/L (ref 9–17)
AST SERPL-CCNC: 51 U/L
BASOPHILS # BLD: 0.01 K/UL (ref 0–0.2)
BASOPHILS NFR BLD: 0 % (ref 0–2)
BILIRUB SERPL-MCNC: 0.5 MG/DL (ref 0.3–1.2)
BUN SERPL-MCNC: 20 MG/DL (ref 8–23)
BUN/CREAT SERPL: 15 (ref 9–20)
CALCIUM SERPL-MCNC: 9.2 MG/DL (ref 8.6–10.4)
CHLORIDE SERPL-SCNC: 105 MMOL/L (ref 98–107)
CO2 SERPL-SCNC: 28 MMOL/L (ref 20–31)
CREAT SERPL-MCNC: 1.3 MG/DL (ref 0.7–1.2)
EOSINOPHIL # BLD: 0.18 K/UL (ref 0–0.4)
EOSINOPHILS RELATIVE PERCENT: 3 % (ref 0–5)
ERYTHROCYTE [DISTWIDTH] IN BLOOD BY AUTOMATED COUNT: 12.7 % (ref 12.1–15.2)
GFR, ESTIMATED: 60 ML/MIN/1.73M2
GLUCOSE SERPL-MCNC: 125 MG/DL (ref 70–99)
HCT VFR BLD AUTO: 38.3 % (ref 41–53)
HGB BLD-MCNC: 13.4 G/DL (ref 13.5–17.5)
IMM GRANULOCYTES # BLD AUTO: 0.01 K/UL (ref 0–0.3)
IMM GRANULOCYTES NFR BLD: 0 % (ref 0–5)
LYMPHOCYTES NFR BLD: 1.19 K/UL (ref 1–4.8)
LYMPHOCYTES RELATIVE PERCENT: 21 % (ref 13–44)
MCH RBC QN AUTO: 29.1 PG (ref 26–34)
MCHC RBC AUTO-ENTMCNC: 35 G/DL (ref 31–37)
MCV RBC AUTO: 83.3 FL (ref 80–100)
MONOCYTES NFR BLD: 0.43 K/UL (ref 0–1)
MONOCYTES NFR BLD: 8 % (ref 5–9)
NEUTROPHILS NFR BLD: 68 % (ref 39–75)
NEUTS SEG NFR BLD: 3.85 K/UL (ref 2.1–6.5)
PLATELET # BLD AUTO: 118 K/UL (ref 140–450)
PMV BLD AUTO: 8.8 FL (ref 6–12)
POTASSIUM SERPL-SCNC: 3.6 MMOL/L (ref 3.7–5.3)
PROT SERPL-MCNC: 6.6 G/DL (ref 6.4–8.3)
RBC # BLD AUTO: 4.6 M/UL (ref 4.5–5.9)
SODIUM SERPL-SCNC: 142 MMOL/L (ref 135–144)
TSH SERPL DL<=0.05 MIU/L-ACNC: 0.33 UIU/ML (ref 0.3–5)
WBC OTHER # BLD: 5.7 K/UL (ref 3.5–11)

## 2024-10-08 PROCEDURE — 83036 HEMOGLOBIN GLYCOSYLATED A1C: CPT

## 2024-10-08 PROCEDURE — 84481 FREE ASSAY (FT-3): CPT

## 2024-10-08 PROCEDURE — 84270 ASSAY OF SEX HORMONE GLOBUL: CPT

## 2024-10-08 PROCEDURE — 84439 ASSAY OF FREE THYROXINE: CPT

## 2024-10-08 PROCEDURE — 84443 ASSAY THYROID STIM HORMONE: CPT

## 2024-10-08 PROCEDURE — 83003 ASSAY GROWTH HORMONE (HGH): CPT

## 2024-10-08 PROCEDURE — 82024 ASSAY OF ACTH: CPT

## 2024-10-08 PROCEDURE — 36415 COLL VENOUS BLD VENIPUNCTURE: CPT

## 2024-10-08 PROCEDURE — 85025 COMPLETE CBC W/AUTO DIFF WBC: CPT

## 2024-10-08 PROCEDURE — 80053 COMPREHEN METABOLIC PANEL: CPT

## 2024-10-08 PROCEDURE — 84146 ASSAY OF PROLACTIN: CPT

## 2024-10-08 PROCEDURE — 84403 ASSAY OF TOTAL TESTOSTERONE: CPT

## 2024-10-09 ENCOUNTER — TELEPHONE (OUTPATIENT)
Dept: FAMILY MEDICINE CLINIC | Age: 69
End: 2024-10-09

## 2024-10-09 ENCOUNTER — OFFICE VISIT (OUTPATIENT)
Age: 69
End: 2024-10-09
Payer: MEDICARE

## 2024-10-09 VITALS
WEIGHT: 315 LBS | OXYGEN SATURATION: 96 % | SYSTOLIC BLOOD PRESSURE: 153 MMHG | BODY MASS INDEX: 44.79 KG/M2 | DIASTOLIC BLOOD PRESSURE: 93 MMHG | HEART RATE: 75 BPM | RESPIRATION RATE: 18 BRPM

## 2024-10-09 DIAGNOSIS — M47.817 LUMBOSACRAL SPONDYLOSIS WITHOUT MYELOPATHY: Primary | ICD-10-CM

## 2024-10-09 DIAGNOSIS — E66.813 CLASS 3 SEVERE OBESITY WITH BODY MASS INDEX (BMI) OF 40.0 TO 44.9 IN ADULT, UNSPECIFIED OBESITY TYPE, UNSPECIFIED WHETHER SERIOUS COMORBIDITY PRESENT: ICD-10-CM

## 2024-10-09 DIAGNOSIS — R74.8 ELEVATED LIVER ENZYMES: Primary | ICD-10-CM

## 2024-10-09 DIAGNOSIS — M51.369 DEGENERATION OF INTERVERTEBRAL DISC OF LUMBAR REGION, UNSPECIFIED WHETHER PAIN PRESENT: ICD-10-CM

## 2024-10-09 DIAGNOSIS — E66.01 CLASS 3 SEVERE OBESITY WITH BODY MASS INDEX (BMI) OF 40.0 TO 44.9 IN ADULT, UNSPECIFIED OBESITY TYPE, UNSPECIFIED WHETHER SERIOUS COMORBIDITY PRESENT: ICD-10-CM

## 2024-10-09 DIAGNOSIS — D69.6 THROMBOCYTOPENIA (HCC): ICD-10-CM

## 2024-10-09 DIAGNOSIS — R10.9 BILATERAL FLANK PAIN: ICD-10-CM

## 2024-10-09 DIAGNOSIS — R79.89 ELEVATED SERUM CREATININE: ICD-10-CM

## 2024-10-09 LAB
ACTH PLAS-MCNC: 33 PG/ML (ref 7–63)
EST. AVERAGE GLUCOSE BLD GHB EST-MCNC: 126 MG/DL
GH SERPL-MCNC: 0.29 NG/ML (ref 0–2.47)
HBA1C MFR BLD: 6 % (ref 4–6)
PROLACTIN SERPL-MCNC: 11.1 NG/ML (ref 4.04–15.2)
SHBG SERPL-SCNC: 50 NMOL/L (ref 19–76)
T3FREE SERPL-MCNC: 2.3 PG/ML (ref 2–4.4)
T4 FREE SERPL-MCNC: 1.2 NG/DL (ref 0.92–1.68)
TESTOST FREE MFR SERPL: 4.7 PG/ML (ref 47–244)
TESTOST SERPL-MCNC: 34 NG/DL (ref 193–740)

## 2024-10-09 PROCEDURE — 1123F ACP DISCUSS/DSCN MKR DOCD: CPT | Performed by: STUDENT IN AN ORGANIZED HEALTH CARE EDUCATION/TRAINING PROGRAM

## 2024-10-09 PROCEDURE — G8484 FLU IMMUNIZE NO ADMIN: HCPCS | Performed by: STUDENT IN AN ORGANIZED HEALTH CARE EDUCATION/TRAINING PROGRAM

## 2024-10-09 PROCEDURE — G8427 DOCREV CUR MEDS BY ELIG CLIN: HCPCS | Performed by: STUDENT IN AN ORGANIZED HEALTH CARE EDUCATION/TRAINING PROGRAM

## 2024-10-09 PROCEDURE — 3077F SYST BP >= 140 MM HG: CPT | Performed by: STUDENT IN AN ORGANIZED HEALTH CARE EDUCATION/TRAINING PROGRAM

## 2024-10-09 PROCEDURE — 99204 OFFICE O/P NEW MOD 45 MIN: CPT | Performed by: STUDENT IN AN ORGANIZED HEALTH CARE EDUCATION/TRAINING PROGRAM

## 2024-10-09 PROCEDURE — 1036F TOBACCO NON-USER: CPT | Performed by: STUDENT IN AN ORGANIZED HEALTH CARE EDUCATION/TRAINING PROGRAM

## 2024-10-09 PROCEDURE — 3017F COLORECTAL CA SCREEN DOC REV: CPT | Performed by: STUDENT IN AN ORGANIZED HEALTH CARE EDUCATION/TRAINING PROGRAM

## 2024-10-09 PROCEDURE — 3080F DIAST BP >= 90 MM HG: CPT | Performed by: STUDENT IN AN ORGANIZED HEALTH CARE EDUCATION/TRAINING PROGRAM

## 2024-10-09 PROCEDURE — G8417 CALC BMI ABV UP PARAM F/U: HCPCS | Performed by: STUDENT IN AN ORGANIZED HEALTH CARE EDUCATION/TRAINING PROGRAM

## 2024-10-09 NOTE — TELEPHONE ENCOUNTER
----- Message from Dr. Zoila Monroy, DO sent at 10/9/2024  1:55 PM EDT -----  Please fwd results to endo  Platelet count low which is new, liver enzymes mildly elevated and kidney function slightly off also. Recommend renal and RUQ US.   Please also see whether his back pain that I saw him for last time got better and if he is having any new symptoms including changes in urination, bowel movements, easy bruising, and if he is taking any over-the-counter meds.

## 2024-10-09 NOTE — PROGRESS NOTES
potassium chloride (KLOR-CON M) 20 MEQ extended release tablet 20 mEq, Oral, DAILY    tamsulosin (FLOMAX) 0.4 mg, Oral, 2 times daily    testosterone (ANDROGEL; TESTIM) 50 MG/5GM (1%) GEL 1% gel apply 2 packets topically every morning to shoulder or UPPER ARM ...  (REFER TO PRESCRIPTION NOTES).    valsartan-hydroCHLOROthiazide (DIOVAN-HCT) 320-25 MG per tablet        No Known Allergies    Review of Systems:   Genitourinary: negative for bowel/bladder incontinence   Musculoskeletal: positive for low back pain  Neurological: negative for radicular leg pain, leg weakness or numbness/tingling    OBJECTIVE:    Vitals:    10/09/24 1149   BP: (!) 153/93   Pulse: 75   Resp: 18   SpO2: 96%       PHYSICAL EXAM    GENERAL: No acute distress, pleasant, well-appearing  HEENT: Normocephalic, atraumatic, Pupils equal and round  CARDIOVASCULAR: Well perfused, No peripheral cyanosis  PULMONARY: Good chest wall excursion, breathing unlabored  PSYCH: Appropriate affect and insight, non-pressured speech  SKIN: No rashes or lesions  MUSCULOSKELETAL:  Inspection: The back and extremities are symmetric and aligned. Muscle bulk is normal in appearance.  Palpation: There is tenderness to palpation along the lumbar paraspinal musculature bilaterally  Lumbar range of motion is full  NEUROMUSCULAR:  Patient ambulates unassisted  Gait is nonantalgic  Sensation to light touch is grossly intact in lower extremities  Strength is grossly intact in lower extremities  No ankle clonus    Special Tests:  Lumbar facet loading is positive bilaterally  Seated straight leg raise is negative bilaterally      DIAGNOSIS:    ICD-10-CM    1. Lumbosacral spondylosis without myelopathy  M47.817       2. Degeneration of intervertebral disc of lumbar region, unspecified whether pain present  M51.369       3. Class 3 severe obesity with body mass index (BMI) of 40.0 to 44.9 in adult, unspecified obesity type, unspecified whether serious comorbidity present  E66.813

## 2024-10-11 ENCOUNTER — HOSPITAL ENCOUNTER (OUTPATIENT)
Dept: ULTRASOUND IMAGING | Age: 69
Discharge: HOME | End: 2024-10-13
Payer: MEDICARE

## 2024-10-11 DIAGNOSIS — R79.89 ELEVATED SERUM CREATININE: ICD-10-CM

## 2024-10-11 DIAGNOSIS — D69.6 THROMBOCYTOPENIA (HCC): ICD-10-CM

## 2024-10-11 DIAGNOSIS — R10.9 BILATERAL FLANK PAIN: ICD-10-CM

## 2024-10-11 DIAGNOSIS — R74.8 ELEVATED LIVER ENZYMES: ICD-10-CM

## 2024-10-11 PROCEDURE — 76770 US EXAM ABDO BACK WALL COMP: CPT

## 2024-10-11 PROCEDURE — 76705 ECHO EXAM OF ABDOMEN: CPT

## 2024-10-15 ENCOUNTER — TELEPHONE (OUTPATIENT)
Dept: FAMILY MEDICINE CLINIC | Age: 69
End: 2024-10-15

## 2024-10-15 DIAGNOSIS — K76.0 FATTY LIVER: Primary | ICD-10-CM

## 2024-10-15 NOTE — TELEPHONE ENCOUNTER
----- Message from Dr. Zoila Monroy DO sent at 10/15/2024  7:50 AM EDT -----  Kidneys look normal and bladder is emptying well. Liver US shows he does have fatty liver and liver enlargement. Recommend seeing GI for eval.   Transposition Flap Text: The defect edges were debeveled with a #15 scalpel blade.  Given the location of the defect and the proximity to free margins a transposition flap was deemed most appropriate.  Using a sterile surgical marker, an appropriate transposition flap was drawn incorporating the defect.    The area thus outlined was incised deep to adipose tissue with a #15 scalpel blade.  The skin margins were undermined to an appropriate distance in all directions utilizing iris scissors.

## 2024-10-16 ENCOUNTER — HOSPITAL ENCOUNTER (OUTPATIENT)
Dept: MRI IMAGING | Age: 69
Discharge: HOME | End: 2024-10-18
Payer: MEDICARE

## 2024-10-16 DIAGNOSIS — E22.0 ACROMEGALY (HCC): ICD-10-CM

## 2024-10-16 PROCEDURE — A9579 GAD-BASE MR CONTRAST NOS,1ML: HCPCS | Performed by: INTERNAL MEDICINE

## 2024-10-16 PROCEDURE — 70553 MRI BRAIN STEM W/O & W/DYE: CPT

## 2024-10-16 PROCEDURE — 6360000004 HC RX CONTRAST MEDICATION: Performed by: INTERNAL MEDICINE

## 2024-10-16 RX ADMIN — GADOTERIDOL 20 ML: 279.3 INJECTION, SOLUTION INTRAVENOUS at 10:46

## 2024-10-24 ENCOUNTER — OFFICE VISIT (OUTPATIENT)
Dept: FAMILY MEDICINE CLINIC | Age: 69
End: 2024-10-24

## 2024-10-24 VITALS
WEIGHT: 315 LBS | BODY MASS INDEX: 44.65 KG/M2 | OXYGEN SATURATION: 95 % | DIASTOLIC BLOOD PRESSURE: 86 MMHG | SYSTOLIC BLOOD PRESSURE: 138 MMHG | HEART RATE: 59 BPM

## 2024-10-24 DIAGNOSIS — R05.9 COUGH IN ADULT: Primary | ICD-10-CM

## 2024-10-24 DIAGNOSIS — G44.89 OTHER HEADACHE SYNDROME: ICD-10-CM

## 2024-10-24 DIAGNOSIS — B97.89 VIRAL SINUSITIS: ICD-10-CM

## 2024-10-24 DIAGNOSIS — J34.89 SINUS PAIN: ICD-10-CM

## 2024-10-24 DIAGNOSIS — J32.9 VIRAL SINUSITIS: ICD-10-CM

## 2024-10-24 RX ORDER — BENZONATATE 100 MG/1
100-200 CAPSULE ORAL 3 TIMES DAILY PRN
Qty: 60 CAPSULE | Refills: 0 | Status: SHIPPED | OUTPATIENT
Start: 2024-10-24 | End: 2024-10-31

## 2024-10-24 ASSESSMENT — ENCOUNTER SYMPTOMS
NAUSEA: 0
WHEEZING: 0
VOMITING: 0
ABDOMINAL PAIN: 0
SINUS PRESSURE: 1
SORE THROAT: 0
DIARRHEA: 0
COUGH: 1
BACK PAIN: 0
SHORTNESS OF BREATH: 0
SINUS PAIN: 1

## 2024-10-24 NOTE — PROGRESS NOTES
HPI Notes    Name: Jomar Norwood  : 1955         Chief Complaint:     Chief Complaint   Patient presents with    Sinus Problem     Sinus pain/discomfort, cough, bilateral ear fullness and headache started 4 days ago.        History of Present Illness:        HPI    This is a 69-year-old gentleman presenting with his wife for evaluation of sinus pain and pressure, cough, bilateral otalgia and fullness and headaches beginning 4 days ago.  He has been afebrile. Symptoms began shortly after visiting their sick grandchildren. Jomar has been taking some Nyquil with little appreciable relief. Jomar denies any bloody sputum, nasal discharge, tooth pain.     Past Medical History:     Past Medical History:   Diagnosis Date    Acromegalia (HCC)     Arthritis     H/O echocardiogram 2017    poor image quality. EF 60%.  Moderate LV hypertrophy normal LV cavity size.  Unable to assess specific wall motion abnormalities due to image quality.  No significant valvular abnormalities.  Mild diastolic dysfunction is seen.    History of stress test     25+ years ago.  per patient it was normal    History of stress test 2017    Equivocal study.  Moderate perfusion defect of moderate intensity in the inferolateral and inferior regions during stress and rest imaging.  EF  71% without regional wall motion abnormalities.  No significant electrocardiographic evidence of MI during EKG Vu Treadmill score 6,low risk for CAD.    Hyperlipidemia     Hypertension     Left retinal detachment     Sleep apnea     C-PAP    Thyroid disease     HYPOTHYROID    Ureteral stone     history      Reviewed all health maintenance requirements and ordered appropriate tests  Health Maintenance Due   Topic Date Due    Hepatitis C screen  Never done    DTaP/Tdap/Td vaccine (1 - Tdap) Never done    Respiratory Syncytial Virus (RSV) Pregnant or age 60 yrs+ (1 - 1-dose 60+ series) Never done    Pneumococcal 65+ years Vaccine (1 of 1 - PCV)

## 2024-11-18 ENCOUNTER — PATIENT MESSAGE (OUTPATIENT)
Dept: FAMILY MEDICINE CLINIC | Age: 69
End: 2024-11-18

## 2024-11-18 DIAGNOSIS — D64.9 NORMOCHROMIC ANEMIA: ICD-10-CM

## 2024-11-18 DIAGNOSIS — R25.2 MUSCLE CRAMP: Primary | ICD-10-CM

## 2024-11-20 RX ORDER — PANTOPRAZOLE SODIUM 40 MG/1
40 TABLET, DELAYED RELEASE ORAL
Qty: 90 TABLET | Refills: 1 | Status: SHIPPED | OUTPATIENT
Start: 2024-11-20

## 2024-11-20 NOTE — TELEPHONE ENCOUNTER
Last visit:  10/24/2024  Next Visit Date:    Future Appointments   Date Time Provider Department Center   3/3/2025 10:20 AM Zoila Monroy DO [facility]   3/6/2025 10:00 AM Itzel Gaitan APRN - CNP [facility]         Medication List:  Prior to Admission medications    Medication Sig Start Date End Date Taking? Authorizing Provider   valsartan-hydroCHLOROthiazide (DIOVAN-HCT) 320-25 MG per tablet  7/10/24   Diamond Clancy MD   lovastatin (MEVACOR) 20 MG tablet Take 1 tablet by mouth daily 9/3/24   Zoila Monroy DO   ibuprofen (ADVIL;MOTRIN) 800 MG tablet Take 1 tablet by mouth 3 times daily as needed for Pain 9/3/24   Zoila Monroy DO   levothyroxine (SYNTHROID) 175 MCG tablet Take 1 tablet by mouth Daily    Diamond Clancy MD   testosterone (ANDROGEL; TESTIM) 50 MG/5GM (1%) GEL 1% gel apply 2 packets topically every morning to shoulder or UPPER ARM ...  (REFER TO PRESCRIPTION NOTES). 5/15/24   Diamond Clancy MD   tamsulosin (FLOMAX) 0.4 MG capsule Take 1 capsule by mouth in the morning and at bedtime 5/23/24   Itzel Gaitan APRN - CNP   oxyBUTYnin (DITROPAN XL) 10 MG extended release tablet Take 1 tablet by mouth daily 5/23/24   Itzel Gaitan APRN - CNP   pantoprazole (PROTONIX) 40 MG tablet take 1 tablet by mouth every morning before breakfast 5/21/24   Zoila Monroy DO   potassium chloride (KLOR-CON M) 20 MEQ extended release tablet Take 1 tablet by mouth daily 5/6/24   Zoila Monroy DO   Misc. Devices (CPAP MACHINE) MISC CPAP machine & supplies 3/18/2024 Pt to use equipment as directed for OBSA Code: G47.33 and Morbid Obesity Code: E66.01 **Pressure settings epap 15.0 and ipap 19.0 - Auto Cpap 3/18/24   Diamond Clancy MD   hydrALAZINE (APRESOLINE) 50 MG tablet Take 1 tablet by mouth 3 times daily  Patient taking differently: Take 1 tablet by mouth every evening 9/24/21   Might, Atilio VELASQUEZ, APRN - CNP   metoprolol

## 2024-11-21 ENCOUNTER — HOSPITAL ENCOUNTER (OUTPATIENT)
Age: 69
Discharge: HOME | End: 2024-11-21
Payer: MEDICARE

## 2024-11-21 DIAGNOSIS — R25.2 MUSCLE CRAMP: ICD-10-CM

## 2024-11-21 DIAGNOSIS — D64.9 NORMOCHROMIC ANEMIA: ICD-10-CM

## 2024-11-21 LAB
ANION GAP SERPL CALCULATED.3IONS-SCNC: 10 MMOL/L (ref 9–17)
BUN SERPL-MCNC: 17 MG/DL (ref 8–23)
BUN/CREAT SERPL: 13 (ref 9–20)
CALCIUM SERPL-MCNC: 9.2 MG/DL (ref 8.6–10.4)
CHLORIDE SERPL-SCNC: 97 MMOL/L (ref 98–107)
CO2 SERPL-SCNC: 31 MMOL/L (ref 20–31)
CREAT SERPL-MCNC: 1.3 MG/DL (ref 0.7–1.2)
FOLATE SERPL-MCNC: 10.6 NG/ML (ref 4.8–24.2)
GFR, ESTIMATED: 60 ML/MIN/1.73M2
GLUCOSE SERPL-MCNC: 159 MG/DL (ref 70–99)
MAGNESIUM SERPL-MCNC: 1.8 MG/DL (ref 1.6–2.6)
POTASSIUM SERPL-SCNC: 3.5 MMOL/L (ref 3.7–5.3)
SODIUM SERPL-SCNC: 138 MMOL/L (ref 135–144)
VIT B12 SERPL-MCNC: 410 PG/ML (ref 232–1245)

## 2024-11-21 PROCEDURE — 80048 BASIC METABOLIC PNL TOTAL CA: CPT

## 2024-11-21 PROCEDURE — 82607 VITAMIN B-12: CPT

## 2024-11-21 PROCEDURE — 83735 ASSAY OF MAGNESIUM: CPT

## 2024-11-21 PROCEDURE — 82746 ASSAY OF FOLIC ACID SERUM: CPT

## 2024-11-21 PROCEDURE — 36415 COLL VENOUS BLD VENIPUNCTURE: CPT

## 2024-11-22 ENCOUNTER — TELEPHONE (OUTPATIENT)
Dept: FAMILY MEDICINE CLINIC | Age: 69
End: 2024-11-22

## 2024-11-22 RX ORDER — POTASSIUM CHLORIDE 1500 MG/1
20 TABLET, EXTENDED RELEASE ORAL 2 TIMES DAILY
Qty: 180 TABLET | Refills: 3 | Status: SHIPPED | OUTPATIENT
Start: 2024-11-22 | End: 2024-11-22

## 2024-11-22 RX ORDER — POTASSIUM CHLORIDE 750 MG/1
20 CAPSULE, EXTENDED RELEASE ORAL 2 TIMES DAILY
Qty: 360 CAPSULE | Refills: 1 | Status: SHIPPED | OUTPATIENT
Start: 2024-11-22

## 2024-11-22 NOTE — TELEPHONE ENCOUNTER
----- Message from Dr. Zoila Monroy, DO sent at 11/22/2024 11:13 AM EST -----  Potassium is still a little low, so I would recommend increasing his Klor-Con to twice a day.  Please pend refill at that dosing.  Labs okay otherwise.  Make sure to hydrate well.

## 2024-11-22 NOTE — TELEPHONE ENCOUNTER
Rx sent, please let them know I had to make a change with med. With him being on oxybutynin now, it can interact with the solid K supplements and raise risk of getting a stomach ulcer from the K pills. So I put him on the capsule (micro K) and he'll need to open it up and sprinkle onto applesauce, etc, and take it that way. Please also call [facility] and have them cancel any klor-con rx's. Thanks.

## 2024-12-05 DIAGNOSIS — I10 ESSENTIAL HYPERTENSION: ICD-10-CM

## 2024-12-05 RX ORDER — HYDRALAZINE HYDROCHLORIDE 50 MG/1
50 TABLET, FILM COATED ORAL 3 TIMES DAILY
Qty: 270 TABLET | Refills: 3 | Status: SHIPPED | OUTPATIENT
Start: 2024-12-05

## 2024-12-05 NOTE — TELEPHONE ENCOUNTER
Last OV: 10/24/2024  cough and sinus 05/01/24 NP chronic  Last RX:    Next scheduled apt: 3/3/2025  3 months chronic           Surescript requesting a refill   Medication pending for approval   Last filled on 09-24-21

## 2025-01-06 ENCOUNTER — PATIENT MESSAGE (OUTPATIENT)
Dept: FAMILY MEDICINE CLINIC | Age: 70
End: 2025-01-06

## 2025-01-06 RX ORDER — VALSARTAN AND HYDROCHLOROTHIAZIDE 320; 25 MG/1; MG/1
1 TABLET, FILM COATED ORAL DAILY
Qty: 90 TABLET | Refills: 1 | Status: SHIPPED | OUTPATIENT
Start: 2025-01-06

## 2025-01-06 NOTE — TELEPHONE ENCOUNTER
Last OV: 10/24/2024 cough and sinus 05/01/24 NP   Last RX:    Next scheduled apt: 3/3/2025   3 months chronic         Pt's wife requesting a refill

## 2025-01-22 ENCOUNTER — PATIENT MESSAGE (OUTPATIENT)
Dept: FAMILY MEDICINE CLINIC | Age: 70
End: 2025-01-22

## 2025-01-22 DIAGNOSIS — I10 ESSENTIAL HYPERTENSION: ICD-10-CM

## 2025-01-22 RX ORDER — LEVOTHYROXINE SODIUM 175 UG/1
175 TABLET ORAL DAILY
Qty: 90 TABLET | Refills: 1 | Status: SHIPPED | OUTPATIENT
Start: 2025-01-22

## 2025-01-22 RX ORDER — METOPROLOL TARTRATE 25 MG/1
25 TABLET, FILM COATED ORAL DAILY
Qty: 90 TABLET | Refills: 1 | Status: SHIPPED | OUTPATIENT
Start: 2025-01-22

## 2025-01-22 NOTE — TELEPHONE ENCOUNTER
Last OV: 10/24/2024  cough 06/04/24 AWV and chronic   Last RX:    Next scheduled apt: 3/3/2025   3 months chronic           Pt requesting a refill

## 2025-02-26 ENCOUNTER — HOSPITAL ENCOUNTER (OUTPATIENT)
Age: 70
Discharge: HOME | End: 2025-02-26
Payer: MEDICARE

## 2025-02-26 ENCOUNTER — HOSPITAL ENCOUNTER (OUTPATIENT)
Age: 70
Discharge: HOME | End: 2025-02-28
Payer: MEDICARE

## 2025-02-26 ENCOUNTER — HOSPITAL ENCOUNTER (OUTPATIENT)
Dept: GENERAL RADIOLOGY | Age: 70
Discharge: HOME | End: 2025-02-28
Payer: MEDICARE

## 2025-02-26 DIAGNOSIS — N20.0 RENAL STONES: ICD-10-CM

## 2025-02-26 DIAGNOSIS — Z12.5 SCREENING PSA (PROSTATE SPECIFIC ANTIGEN): ICD-10-CM

## 2025-02-26 LAB
ALBUMIN SERPL-MCNC: 3.9 G/DL (ref 3.5–5.2)
ALBUMIN/GLOB SERPL: 1.3 {RATIO} (ref 1–2.5)
ALP SERPL-CCNC: 66 U/L (ref 40–129)
ALT SERPL-CCNC: 43 U/L (ref 10–50)
ANION GAP SERPL CALCULATED.3IONS-SCNC: 9 MMOL/L (ref 9–16)
AST SERPL-CCNC: 27 U/L (ref 10–50)
BASOPHILS # BLD: <0.03 K/UL (ref 0–0.2)
BASOPHILS NFR BLD: 0 % (ref 0–2)
BILIRUB SERPL-MCNC: 0.3 MG/DL (ref 0–1.2)
BUN SERPL-MCNC: 13 MG/DL (ref 8–23)
BUN/CREAT SERPL: 12 (ref 9–20)
CALCIUM SERPL-MCNC: 9.1 MG/DL (ref 8.6–10.4)
CHLORIDE SERPL-SCNC: 102 MMOL/L (ref 98–107)
CO2 SERPL-SCNC: 31 MMOL/L (ref 20–31)
CREAT SERPL-MCNC: 1.1 MG/DL (ref 0.7–1.2)
EOSINOPHIL # BLD: 0.17 K/UL (ref 0–0.44)
EOSINOPHILS RELATIVE PERCENT: 3 % (ref 1–4)
ERYTHROCYTE [DISTWIDTH] IN BLOOD BY AUTOMATED COUNT: 12.8 % (ref 11.8–14.4)
GFR, ESTIMATED: 74 ML/MIN/1.73M2
GLUCOSE SERPL-MCNC: 141 MG/DL (ref 74–99)
HCT VFR BLD AUTO: 40.1 % (ref 40.7–50.3)
HGB BLD-MCNC: 13.3 G/DL (ref 13–17)
IMM GRANULOCYTES # BLD AUTO: <0.03 K/UL (ref 0–0.3)
IMM GRANULOCYTES NFR BLD: 0 %
LYMPHOCYTES NFR BLD: 1.31 K/UL (ref 1.1–3.7)
LYMPHOCYTES RELATIVE PERCENT: 25 % (ref 24–43)
MCH RBC QN AUTO: 29.4 PG (ref 25.2–33.5)
MCHC RBC AUTO-ENTMCNC: 33.2 G/DL (ref 28.4–34.8)
MCV RBC AUTO: 88.5 FL (ref 82.6–102.9)
MONOCYTES NFR BLD: 0.47 K/UL (ref 0.1–1.2)
MONOCYTES NFR BLD: 9 % (ref 3–12)
NEUTROPHILS NFR BLD: 63 % (ref 36–65)
NEUTS SEG NFR BLD: 3.33 K/UL (ref 1.5–8.1)
NRBC BLD-RTO: 0 PER 100 WBC
PLATELET # BLD AUTO: 155 K/UL (ref 138–453)
PMV BLD AUTO: 9.1 FL (ref 8.1–13.5)
POTASSIUM SERPL-SCNC: 3.5 MMOL/L (ref 3.7–5.3)
PROT SERPL-MCNC: 6.8 G/DL (ref 6.6–8.7)
PSA SERPL-MCNC: 0.13 NG/ML (ref 0–4)
RBC # BLD AUTO: 4.53 M/UL (ref 4.21–5.77)
SODIUM SERPL-SCNC: 142 MMOL/L (ref 136–145)
WBC OTHER # BLD: 5.3 K/UL (ref 3.5–11.3)

## 2025-02-26 PROCEDURE — 36415 COLL VENOUS BLD VENIPUNCTURE: CPT

## 2025-02-26 PROCEDURE — 85025 COMPLETE CBC W/AUTO DIFF WBC: CPT

## 2025-02-26 PROCEDURE — G0103 PSA SCREENING: HCPCS

## 2025-02-26 PROCEDURE — 74018 RADEX ABDOMEN 1 VIEW: CPT

## 2025-02-26 PROCEDURE — 80053 COMPREHEN METABOLIC PANEL: CPT

## 2025-03-03 ENCOUNTER — HOSPITAL ENCOUNTER (OUTPATIENT)
Age: 70
Discharge: HOME | End: 2025-03-03
Payer: MEDICARE

## 2025-03-03 ENCOUNTER — OFFICE VISIT (OUTPATIENT)
Dept: FAMILY MEDICINE CLINIC | Age: 70
End: 2025-03-03
Payer: MEDICARE

## 2025-03-03 VITALS
WEIGHT: 315 LBS | DIASTOLIC BLOOD PRESSURE: 86 MMHG | BODY MASS INDEX: 44.1 KG/M2 | OXYGEN SATURATION: 95 % | SYSTOLIC BLOOD PRESSURE: 120 MMHG | HEIGHT: 71 IN | HEART RATE: 85 BPM

## 2025-03-03 DIAGNOSIS — E87.6 HYPOKALEMIA: ICD-10-CM

## 2025-03-03 DIAGNOSIS — K76.0 NAFLD (NONALCOHOLIC FATTY LIVER DISEASE): ICD-10-CM

## 2025-03-03 DIAGNOSIS — G47.33 OSA (OBSTRUCTIVE SLEEP APNEA): ICD-10-CM

## 2025-03-03 DIAGNOSIS — E66.01 MORBID OBESITY DUE TO EXCESS CALORIES: ICD-10-CM

## 2025-03-03 DIAGNOSIS — E22.0 ACROMEGALY (HCC): ICD-10-CM

## 2025-03-03 DIAGNOSIS — R05.2 SUBACUTE COUGH: Primary | ICD-10-CM

## 2025-03-03 LAB
FSH SERPL-ACNC: 0.3 MIU/ML (ref 1.5–12.4)
GH SERPL-MCNC: 0.87 NG/ML (ref 0–2.47)
HGB BLD-MCNC: 13.6 G/DL (ref 13.5–17.5)
HGH COLLECTION INFO: NORMAL
IGF-1 COLLECTION INFO: NORMAL
LH SERPL-ACNC: <0.1 MIU/ML (ref 1.7–8.6)
PROLACTIN SERPL-MCNC: 8.78 NG/ML (ref 4.04–15.2)
PSA SERPL-MCNC: 0.15 NG/ML (ref 0–4)
SOMATOMEDIN C: 186 NG/ML (ref 56.3–186)
TESTOST SERPL-MCNC: 604 NG/DL (ref 193–740)

## 2025-03-03 PROCEDURE — 84403 ASSAY OF TOTAL TESTOSTERONE: CPT

## 2025-03-03 PROCEDURE — 1036F TOBACCO NON-USER: CPT | Performed by: FAMILY MEDICINE

## 2025-03-03 PROCEDURE — 85018 HEMOGLOBIN: CPT

## 2025-03-03 PROCEDURE — G8427 DOCREV CUR MEDS BY ELIG CLIN: HCPCS | Performed by: FAMILY MEDICINE

## 2025-03-03 PROCEDURE — 84146 ASSAY OF PROLACTIN: CPT

## 2025-03-03 PROCEDURE — 1123F ACP DISCUSS/DSCN MKR DOCD: CPT | Performed by: FAMILY MEDICINE

## 2025-03-03 PROCEDURE — G0103 PSA SCREENING: HCPCS

## 2025-03-03 PROCEDURE — 83003 ASSAY GROWTH HORMONE (HGH): CPT

## 2025-03-03 PROCEDURE — 83001 ASSAY OF GONADOTROPIN (FSH): CPT

## 2025-03-03 PROCEDURE — 3079F DIAST BP 80-89 MM HG: CPT | Performed by: FAMILY MEDICINE

## 2025-03-03 PROCEDURE — G8417 CALC BMI ABV UP PARAM F/U: HCPCS | Performed by: FAMILY MEDICINE

## 2025-03-03 PROCEDURE — 84305 ASSAY OF SOMATOMEDIN: CPT

## 2025-03-03 PROCEDURE — 3074F SYST BP LT 130 MM HG: CPT | Performed by: FAMILY MEDICINE

## 2025-03-03 PROCEDURE — 83002 ASSAY OF GONADOTROPIN (LH): CPT

## 2025-03-03 PROCEDURE — 36415 COLL VENOUS BLD VENIPUNCTURE: CPT

## 2025-03-03 PROCEDURE — 99214 OFFICE O/P EST MOD 30 MIN: CPT | Performed by: FAMILY MEDICINE

## 2025-03-03 PROCEDURE — 3017F COLORECTAL CA SCREEN DOC REV: CPT | Performed by: FAMILY MEDICINE

## 2025-03-03 RX ORDER — RESMETIROM 100 MG/1
TABLET, COATED ORAL
COMMUNITY
Start: 2025-02-19

## 2025-03-03 RX ORDER — LOVASTATIN 20 MG/1
20 TABLET ORAL DAILY
Qty: 90 TABLET | Refills: 3 | Status: SHIPPED | OUTPATIENT
Start: 2025-03-03

## 2025-03-03 RX ORDER — OMEGA-3-ACID ETHYL ESTERS 1 G/1
1 CAPSULE, LIQUID FILLED ORAL 2 TIMES DAILY
COMMUNITY

## 2025-03-03 RX ORDER — HYDROCORTISONE 5 MG/1
5 TABLET ORAL DAILY
COMMUNITY

## 2025-03-03 SDOH — ECONOMIC STABILITY: FOOD INSECURITY: WITHIN THE PAST 12 MONTHS, YOU WORRIED THAT YOUR FOOD WOULD RUN OUT BEFORE YOU GOT MONEY TO BUY MORE.: NEVER TRUE

## 2025-03-03 SDOH — ECONOMIC STABILITY: FOOD INSECURITY: WITHIN THE PAST 12 MONTHS, THE FOOD YOU BOUGHT JUST DIDN'T LAST AND YOU DIDN'T HAVE MONEY TO GET MORE.: NEVER TRUE

## 2025-03-03 ASSESSMENT — PATIENT HEALTH QUESTIONNAIRE - PHQ9
1. LITTLE INTEREST OR PLEASURE IN DOING THINGS: NOT AT ALL
SUM OF ALL RESPONSES TO PHQ QUESTIONS 1-9: 0
2. FEELING DOWN, DEPRESSED OR HOPELESS: NOT AT ALL

## 2025-03-03 NOTE — PATIENT INSTRUCTIONS
If staying on oxybutynin, you need to take the potassium in capsule form. Otherwise we can go back to the big solid pill.  We're working on tracking down sleep study. If your original apnea index was in mod-severe range we may be able to start zepbound.

## 2025-03-06 ENCOUNTER — OFFICE VISIT (OUTPATIENT)
Dept: UROLOGY | Age: 70
End: 2025-03-06
Payer: MEDICARE

## 2025-03-06 VITALS
HEART RATE: 80 BPM | WEIGHT: 315 LBS | DIASTOLIC BLOOD PRESSURE: 83 MMHG | BODY MASS INDEX: 46.23 KG/M2 | SYSTOLIC BLOOD PRESSURE: 120 MMHG

## 2025-03-06 DIAGNOSIS — N40.1 BPH WITH OBSTRUCTION/LOWER URINARY TRACT SYMPTOMS: ICD-10-CM

## 2025-03-06 DIAGNOSIS — N39.41 URGE INCONTINENCE: ICD-10-CM

## 2025-03-06 DIAGNOSIS — N20.0 RENAL STONES: Primary | ICD-10-CM

## 2025-03-06 DIAGNOSIS — R39.15 URGENCY OF URINATION: ICD-10-CM

## 2025-03-06 DIAGNOSIS — N13.8 BPH WITH OBSTRUCTION/LOWER URINARY TRACT SYMPTOMS: ICD-10-CM

## 2025-03-06 DIAGNOSIS — Z12.5 SCREENING PSA (PROSTATE SPECIFIC ANTIGEN): ICD-10-CM

## 2025-03-06 PROCEDURE — 3079F DIAST BP 80-89 MM HG: CPT | Performed by: NURSE PRACTITIONER

## 2025-03-06 PROCEDURE — 3074F SYST BP LT 130 MM HG: CPT | Performed by: NURSE PRACTITIONER

## 2025-03-06 PROCEDURE — 3017F COLORECTAL CA SCREEN DOC REV: CPT | Performed by: NURSE PRACTITIONER

## 2025-03-06 PROCEDURE — 99213 OFFICE O/P EST LOW 20 MIN: CPT | Performed by: NURSE PRACTITIONER

## 2025-03-06 PROCEDURE — 1159F MED LIST DOCD IN RCRD: CPT | Performed by: NURSE PRACTITIONER

## 2025-03-06 PROCEDURE — 1036F TOBACCO NON-USER: CPT | Performed by: NURSE PRACTITIONER

## 2025-03-06 PROCEDURE — PBSHW PBB SHADOW CHARGE: Performed by: NURSE PRACTITIONER

## 2025-03-06 PROCEDURE — G8417 CALC BMI ABV UP PARAM F/U: HCPCS | Performed by: NURSE PRACTITIONER

## 2025-03-06 PROCEDURE — 51798 US URINE CAPACITY MEASURE: CPT | Performed by: NURSE PRACTITIONER

## 2025-03-06 PROCEDURE — 1123F ACP DISCUSS/DSCN MKR DOCD: CPT | Performed by: NURSE PRACTITIONER

## 2025-03-06 PROCEDURE — G8427 DOCREV CUR MEDS BY ELIG CLIN: HCPCS | Performed by: NURSE PRACTITIONER

## 2025-03-06 RX ORDER — TAMSULOSIN HYDROCHLORIDE 0.4 MG/1
0.4 CAPSULE ORAL 2 TIMES DAILY
Qty: 180 CAPSULE | Refills: 3 | Status: SHIPPED | OUTPATIENT
Start: 2025-03-06

## 2025-03-06 RX ORDER — OXYBUTYNIN CHLORIDE 10 MG/1
10 TABLET, EXTENDED RELEASE ORAL DAILY
Qty: 90 TABLET | Refills: 3 | Status: SHIPPED | OUTPATIENT
Start: 2025-03-06

## 2025-03-06 NOTE — PROGRESS NOTES
History  Acromegalia - follows with endocrinology    3/2017 right HLL for 8mm ureteral stone    11/2020 Right ESWL for a 7mm right renal stone    3/2022 urgency and intermittent urge incontinence   Flomax    3/2023 urgency and intermittent urge incontinence  Flomax increased to twice a day    3/2024 urgency and urge incontinence   Cysto showed minimal bilobar hyperplasia, high riding bladder neck     Started oxybutynin daily    Today  Here today to follow-up for BPH and overactive bladder.  He is taking Flomax twice per day.  We did start him on oxybutynin 10 mg extended release at his last visit.  He is very happy with his symptom relief.  He denies frequency, urgency, nocturia or incontinence.  PVR is low.  He does have dry mouth since starting the oxybutynin, but states that this is not bothersome.    Plan  Continue Flomax twice per day    Continue oxybutynin daily    Follow-up in one year with a KUB and PSA prior or sooner if needed

## 2025-03-06 NOTE — PATIENT INSTRUCTIONS
Survey:    You may be receiving a survey from [employer] regarding your visit today.    Please complete the survey to enable us to provide the highest quality of care to you and your family.    If you cannot score us as very good on any question, please call the office to discuss how we could have major experience exceptional.    Thank you    Your Urology Care Team.    no

## 2025-03-11 ENCOUNTER — PATIENT MESSAGE (OUTPATIENT)
Dept: FAMILY MEDICINE CLINIC | Age: 70
End: 2025-03-11

## 2025-03-20 ENCOUNTER — PATIENT MESSAGE (OUTPATIENT)
Dept: FAMILY MEDICINE CLINIC | Age: 70
End: 2025-03-20

## 2025-03-20 DIAGNOSIS — G47.33 SEVERE OBSTRUCTIVE SLEEP APNEA: Primary | ICD-10-CM

## 2025-03-26 ENCOUNTER — PATIENT MESSAGE (OUTPATIENT)
Dept: FAMILY MEDICINE CLINIC | Age: 70
End: 2025-03-26

## 2025-04-14 ENCOUNTER — PATIENT MESSAGE (OUTPATIENT)
Dept: FAMILY MEDICINE CLINIC | Age: 70
End: 2025-04-14

## 2025-04-28 ENCOUNTER — OFFICE VISIT (OUTPATIENT)
Dept: FAMILY MEDICINE CLINIC | Age: 70
End: 2025-04-28
Payer: MEDICARE

## 2025-04-28 VITALS
BODY MASS INDEX: 45.21 KG/M2 | WEIGHT: 315 LBS | SYSTOLIC BLOOD PRESSURE: 110 MMHG | HEART RATE: 71 BPM | DIASTOLIC BLOOD PRESSURE: 66 MMHG | OXYGEN SATURATION: 94 %

## 2025-04-28 DIAGNOSIS — R05.3 CHRONIC COUGH: ICD-10-CM

## 2025-04-28 DIAGNOSIS — G47.33 OSA (OBSTRUCTIVE SLEEP APNEA): ICD-10-CM

## 2025-04-28 DIAGNOSIS — M10.071 ACUTE IDIOPATHIC GOUT INVOLVING TOE OF RIGHT FOOT: Primary | ICD-10-CM

## 2025-04-28 PROCEDURE — 3074F SYST BP LT 130 MM HG: CPT | Performed by: FAMILY MEDICINE

## 2025-04-28 PROCEDURE — 3078F DIAST BP <80 MM HG: CPT | Performed by: FAMILY MEDICINE

## 2025-04-28 PROCEDURE — 99214 OFFICE O/P EST MOD 30 MIN: CPT | Performed by: FAMILY MEDICINE

## 2025-04-28 PROCEDURE — G8417 CALC BMI ABV UP PARAM F/U: HCPCS | Performed by: FAMILY MEDICINE

## 2025-04-28 PROCEDURE — G8427 DOCREV CUR MEDS BY ELIG CLIN: HCPCS | Performed by: FAMILY MEDICINE

## 2025-04-28 PROCEDURE — 3017F COLORECTAL CA SCREEN DOC REV: CPT | Performed by: FAMILY MEDICINE

## 2025-04-28 PROCEDURE — 1123F ACP DISCUSS/DSCN MKR DOCD: CPT | Performed by: FAMILY MEDICINE

## 2025-04-28 PROCEDURE — 1036F TOBACCO NON-USER: CPT | Performed by: FAMILY MEDICINE

## 2025-04-28 PROCEDURE — 1159F MED LIST DOCD IN RCRD: CPT | Performed by: FAMILY MEDICINE

## 2025-04-28 RX ORDER — INDOMETHACIN 50 MG/1
50 CAPSULE ORAL 3 TIMES DAILY
Qty: 15 CAPSULE | Refills: 0 | Status: SHIPPED | OUTPATIENT
Start: 2025-04-28

## 2025-04-28 NOTE — PROGRESS NOTES
Name: Jomar Norwood  : 1955         Chief Complaint:     Chief Complaint   Patient presents with    Toe Pain     Right great toe pain. Started on Saturday.     Cough     Worse after he eats and through out the night.        History of Present Illness:      Jomar Norwood is a 69 y.o.  male who presents with Toe Pain (Right great toe pain. Started on Saturday. ) and Cough (Worse after he eats and through out the night. )      HPI    Pt c/o pain R 1st MTP onset 2d ago on waking. Thinks a couple wks ago it may have been a little sore which he attributed to using certain cardio machine at the gym. However, this was an acute onset of this severity of pain and accompanied by swelling. No previous gout but dad had had gout. No recent dietary changes or med changes. Rare alcohol, did have 1 beer 2d ago, first he'd had in about a month. No tx tried.    Cough still ongoing. Bothers wife more than it bothers him. Gets a little phlegm upper chest/lower throat that sets off the cough. Happens overnight also while wearing CPAP. Has had indigestion past couple days. No protonix d/t not having any stomach pain. Never had asthma but in childhood had croup a lot, thinks til 10-12 yrs old. Sometimes takes delsym before bed. Pt thinks it works but wife doesn't.     ANDERS - started zepbound and has felt fine on it, currently 5 mg wkly. Does think potentially it's caused some of his indigestion.    Medical History:     Patient Active Problem List   Diagnosis    Ureteral calculus    Retinal detachment of left eye with multiple breaks    Renal stones    Acromegaly (HCC)    Essential hypertension    Hypopituitarism    Severe obstructive sleep apnea    BPH with obstruction/lower urinary tract symptoms    Dyslipidemia    Seasonal allergies    Orthostatic hypotension       Medications:       Prior to Admission medications    Medication Sig Start Date End Date Taking? Authorizing Provider   indomethacin (INDOCIN) 50 MG capsule Take 1

## 2025-05-05 ENCOUNTER — TELEPHONE (OUTPATIENT)
Dept: FAMILY MEDICINE CLINIC | Age: 70
End: 2025-05-05

## 2025-05-05 NOTE — TELEPHONE ENCOUNTER
Agree with advice  
taking differently: Take 1 tablet by mouth nightly 12/5/24   Zoila Monroy DO   potassium chloride (MICRO-K) 10 MEQ extended release capsule Take 2 capsules by mouth 2 times daily 11/22/24   Zoila Monroy DO   pantoprazole (PROTONIX) 40 MG tablet TAKE 1 TABLET BY MOUTH EVERY MORNING BEFORE BREAKFAST 11/20/24   Zoila Monroy DO   ibuprofen (ADVIL;MOTRIN) 800 MG tablet Take 1 tablet by mouth 3 times daily as needed for Pain 9/3/24   Zoila Monroy DO   testosterone (ANDROGEL; TESTIM) 50 MG/5GM (1%) GEL 1% gel apply 2 packets topically every morning to shoulder or UPPER ARM ...  (REFER TO PRESCRIPTION NOTES). 5/15/24   Diamond Clancy MD   Misc. Devices (CPAP MACHINE) MISC CPAP machine & supplies 3/18/2024 Pt to use equipment as directed for OBSA Code: G47.33 and Morbid Obesity Code: E66.01 **Pressure settings epap 15.0 and ipap 19.0 - Auto Cpap 3/18/24   Diamond Clancy MD   aspirin 81 MG EC tablet Take 1 tablet by mouth daily    Diamond Clancy MD   polyethylene glycol (GLYCOLAX) packet Take 1 packet by mouth nightly    Diamond Clancy MD   Ascorbic Acid (VITAMIN C) 500 MG CAPS Take by mouth daily    Diamond Clancy MD

## 2025-05-14 ENCOUNTER — PATIENT MESSAGE (OUTPATIENT)
Dept: FAMILY MEDICINE CLINIC | Age: 70
End: 2025-05-14

## 2025-05-19 DIAGNOSIS — I10 ESSENTIAL HYPERTENSION: ICD-10-CM

## 2025-05-19 RX ORDER — TIRZEPATIDE 5 MG/.5ML
INJECTION, SOLUTION SUBCUTANEOUS
Qty: 2 ML | Refills: 0 | OUTPATIENT
Start: 2025-05-19

## 2025-05-19 RX ORDER — METOPROLOL TARTRATE 25 MG/1
25 TABLET, FILM COATED ORAL DAILY
Qty: 90 TABLET | Refills: 1 | Status: SHIPPED | OUTPATIENT
Start: 2025-05-19

## 2025-05-19 RX ORDER — POTASSIUM CHLORIDE 750 MG/1
20 CAPSULE, EXTENDED RELEASE ORAL 2 TIMES DAILY
Qty: 360 CAPSULE | Refills: 1 | Status: SHIPPED | OUTPATIENT
Start: 2025-05-19

## 2025-05-19 NOTE — TELEPHONE ENCOUNTER
Last visit:  4/28/2025(acute), 03/03/2025 (HNT, sleep apnea)  Next Visit Date:    Future Appointments   Date Time Provider Department Center   6/5/2025 10:40 AM Zoila Monroy DO [facility]   3/9/2026 10:00 AM Heron Celestin PA-C [facility]         Medication List:  Prior to Admission medications    Medication Sig Start Date End Date Taking? Authorizing Provider   tirzepatide-weight management (ZEPBOUND) 5 MG/0.5ML SOAJ subCUTAneous auto-injector pen Inject 5 mg into the skin every 7 days 5/14/25   Zoila Monroy DO   indomethacin (INDOCIN) 50 MG capsule Take 1 capsule by mouth 3 times daily 4/28/25   Zoila Monroy DO   tamsulosin (FLOMAX) 0.4 MG capsule Take 1 capsule by mouth in the morning and at bedtime 3/6/25   Itzel Gaitan APRN - CNP   oxyBUTYnin (DITROPAN XL) 10 MG extended release tablet Take 1 tablet by mouth daily 3/6/25   Itzel Gaitan APRN - CNP   REZDIFFRA 100 MG TABS  2/19/25   Diamond Clancy MD   omega-3 acid ethyl esters (LOVAZA) 1 g capsule Take 1 capsule by mouth 2 times daily    Diamond Clancy MD   hydrocortisone (CORTEF) 5 MG tablet Take 1 tablet by mouth daily    Diamond Clancy MD   lovastatin (MEVACOR) 20 MG tablet Take 1 tablet by mouth daily 3/3/25   Zoila Monroy DO   metoprolol tartrate (LOPRESSOR) 25 MG tablet Take 1 tablet by mouth daily 1/22/25   Zoila Monroy DO   levothyroxine (SYNTHROID) 175 MCG tablet Take 1 tablet by mouth Daily 1/22/25   Zoila Monroy DO   valsartan-hydroCHLOROthiazide (DIOVAN-HCT) 320-25 MG per tablet Take 1 tablet by mouth daily 1/6/25   Zoila Monroy DO   hydrALAZINE (APRESOLINE) 50 MG tablet Take 1 tablet by mouth 3 times daily  Patient taking differently: Take 1 tablet by mouth nightly 12/5/24   Zoila Monroy DO   potassium chloride (MICRO-K) 10 MEQ extended release capsule Take 2 capsules by mouth 2 times daily 11/22/24   Zoila Monroy, DO

## 2025-06-15 ENCOUNTER — PATIENT MESSAGE (OUTPATIENT)
Dept: FAMILY MEDICINE CLINIC | Age: 70
End: 2025-06-15

## 2025-06-15 DIAGNOSIS — G47.33 SEVERE OBSTRUCTIVE SLEEP APNEA: Primary | ICD-10-CM

## 2025-06-24 ENCOUNTER — OFFICE VISIT (OUTPATIENT)
Dept: FAMILY MEDICINE CLINIC | Age: 70
End: 2025-06-24
Payer: MEDICARE

## 2025-06-24 VITALS
OXYGEN SATURATION: 98 % | WEIGHT: 310 LBS | DIASTOLIC BLOOD PRESSURE: 60 MMHG | BODY MASS INDEX: 43.4 KG/M2 | HEIGHT: 71 IN | HEART RATE: 68 BPM | SYSTOLIC BLOOD PRESSURE: 86 MMHG

## 2025-06-24 DIAGNOSIS — E23.0 HYPOPITUITARISM: ICD-10-CM

## 2025-06-24 DIAGNOSIS — G47.33 SEVERE OBSTRUCTIVE SLEEP APNEA: ICD-10-CM

## 2025-06-24 DIAGNOSIS — I10 ESSENTIAL HYPERTENSION: ICD-10-CM

## 2025-06-24 DIAGNOSIS — Z00.00 INITIAL MEDICARE ANNUAL WELLNESS VISIT: Primary | ICD-10-CM

## 2025-06-24 DIAGNOSIS — L98.9 SKIN LESION: ICD-10-CM

## 2025-06-24 PROCEDURE — 3074F SYST BP LT 130 MM HG: CPT | Performed by: FAMILY MEDICINE

## 2025-06-24 PROCEDURE — G8427 DOCREV CUR MEDS BY ELIG CLIN: HCPCS | Performed by: FAMILY MEDICINE

## 2025-06-24 PROCEDURE — 1123F ACP DISCUSS/DSCN MKR DOCD: CPT | Performed by: FAMILY MEDICINE

## 2025-06-24 PROCEDURE — G0438 PPPS, INITIAL VISIT: HCPCS | Performed by: FAMILY MEDICINE

## 2025-06-24 PROCEDURE — 99214 OFFICE O/P EST MOD 30 MIN: CPT | Performed by: FAMILY MEDICINE

## 2025-06-24 PROCEDURE — 1036F TOBACCO NON-USER: CPT | Performed by: FAMILY MEDICINE

## 2025-06-24 PROCEDURE — 3017F COLORECTAL CA SCREEN DOC REV: CPT | Performed by: FAMILY MEDICINE

## 2025-06-24 PROCEDURE — 3078F DIAST BP <80 MM HG: CPT | Performed by: FAMILY MEDICINE

## 2025-06-24 PROCEDURE — 1159F MED LIST DOCD IN RCRD: CPT | Performed by: FAMILY MEDICINE

## 2025-06-24 PROCEDURE — G8417 CALC BMI ABV UP PARAM F/U: HCPCS | Performed by: FAMILY MEDICINE

## 2025-06-24 RX ORDER — HYDRALAZINE HYDROCHLORIDE 50 MG/1
25 TABLET, FILM COATED ORAL NIGHTLY
Qty: 30 TABLET | Refills: 0
Start: 2025-06-24

## 2025-06-24 RX ORDER — PANTOPRAZOLE SODIUM 40 MG/1
40 TABLET, DELAYED RELEASE ORAL
Qty: 90 TABLET | Refills: 1 | Status: SHIPPED | OUTPATIENT
Start: 2025-06-24

## 2025-06-24 RX ORDER — LEVOTHYROXINE SODIUM 175 UG/1
175 TABLET ORAL DAILY
Qty: 90 TABLET | Refills: 1 | Status: CANCELLED | OUTPATIENT
Start: 2025-06-24

## 2025-06-24 RX ORDER — HYDROCORTISONE 5 MG/1
TABLET ORAL
Qty: 120 TABLET | Refills: 0
Start: 2025-06-24

## 2025-06-24 SDOH — HEALTH STABILITY: PHYSICAL HEALTH: ON AVERAGE, HOW MANY DAYS PER WEEK DO YOU ENGAGE IN MODERATE TO STRENUOUS EXERCISE (LIKE A BRISK WALK)?: 3 DAYS

## 2025-06-24 SDOH — HEALTH STABILITY: PHYSICAL HEALTH: ON AVERAGE, HOW MANY MINUTES DO YOU ENGAGE IN EXERCISE AT THIS LEVEL?: 60 MIN

## 2025-06-24 ASSESSMENT — LIFESTYLE VARIABLES
HOW OFTEN DO YOU HAVE SIX OR MORE DRINKS ON ONE OCCASION: 1
HOW MANY STANDARD DRINKS CONTAINING ALCOHOL DO YOU HAVE ON A TYPICAL DAY: 0
HOW OFTEN DO YOU HAVE A DRINK CONTAINING ALCOHOL: 1
HOW OFTEN DO YOU HAVE A DRINK CONTAINING ALCOHOL: NEVER
HOW MANY STANDARD DRINKS CONTAINING ALCOHOL DO YOU HAVE ON A TYPICAL DAY: PATIENT DOES NOT DRINK

## 2025-06-24 ASSESSMENT — PATIENT HEALTH QUESTIONNAIRE - PHQ9
2. FEELING DOWN, DEPRESSED OR HOPELESS: NOT AT ALL
1. LITTLE INTEREST OR PLEASURE IN DOING THINGS: NOT AT ALL
SUM OF ALL RESPONSES TO PHQ QUESTIONS 1-9: 0

## 2025-06-24 NOTE — PROGRESS NOTES
Medicare Annual Wellness Visit    Jomar Norwood is here for Medicare AWV, Hypertension, and Sleep Apnea (Using tirzepetide for obesity and Sleep apnea.  Using BiPAP nightly, benefits from use)    Assessment & Plan   Initial Medicare annual wellness visit  Severe obstructive sleep apnea  Essential hypertension  -     hydrALAZINE (APRESOLINE) 50 MG tablet; Take 0.5 tablets by mouth nightly - HOLD as of 6/24/25, Disp-30 tablet, R-0NO PRINT  Hypopituitarism  Skin lesion       Return in about 3 months (around 9/24/2025) for ANDERS.     Subjective       Patient's complete Health Risk Assessment and screening values have been reviewed and are found in Flowsheets. The following problems were reviewed today and where indicated follow up appointments were made and/or referrals ordered.    Positive Risk Factor Screenings with Interventions:                Abnormal BMI (obese):  Body mass index is 43.26 kg/m². (!) Abnormal  Interventions:  Patient declines any further evaluation or treatment        Dentist Screen:  Have you seen the dentist within the past year?: (!) (Proxy-Rptd) No    Intervention:  Patient declines any further evaluation or treatment        Advanced Directives:  Do you have a Living Will?: (!) (Proxy-Rptd) No    Intervention:  has NO advanced directive - not interested in additional information                     Objective   Vitals:    06/24/25 1319   BP: (!) 86/60   Pulse: 68   SpO2: 98%   Weight: (!) 140.6 kg (310 lb)   Height: 1.803 m (5' 10.98\")      Body mass index is 43.26 kg/m².                No Known Allergies  Prior to Visit Medications    Medication Sig Taking? Authorizing Provider   pantoprazole (PROTONIX) 40 MG tablet Take 1 tablet by mouth every morning (before breakfast) Yes Zoila Monroy DO   hydrALAZINE (APRESOLINE) 50 MG tablet Take 0.5 tablets by mouth nightly - HOLD as of 6/24/25 Yes Zoila Monroy DO   hydrocortisone (CORTEF) 5 MG tablet 15 mg po every morning and 5 mg po every 
apnea    BPH with obstruction/lower urinary tract symptoms    Dyslipidemia    Seasonal allergies    Orthostatic hypotension       Medications:       Prior to Admission medications    Medication Sig Start Date End Date Taking? Authorizing Provider   pantoprazole (PROTONIX) 40 MG tablet Take 1 tablet by mouth every morning (before breakfast) 6/24/25  Yes Zoila Monroy DO   hydrALAZINE (APRESOLINE) 50 MG tablet Take 0.5 tablets by mouth nightly - HOLD as of 6/24/25 6/24/25  Yes Zoila Monroy DO   hydrocortisone (CORTEF) 5 MG tablet 15 mg po every morning and 5 mg po every evening 6/24/25  Yes Zoila Monroy DO   tirzepatide-weight management (ZEPBOUND) 7.5 MG/0.5ML SOAJ subCUTAneous auto-injector pen Inject 7.5 mg into the skin once a week 6/16/25  Yes Zoila Monroy DO   metoprolol tartrate (LOPRESSOR) 25 MG tablet Take 1 tablet by mouth daily 5/19/25  Yes Zoila Monroy DO   potassium chloride (MICRO-K) 10 MEQ extended release capsule Take 2 capsules by mouth 2 times daily 5/19/25  Yes Zoila Monroy DO   tamsulosin (FLOMAX) 0.4 MG capsule Take 1 capsule by mouth in the morning and at bedtime 3/6/25  Yes Itzel Gaitan APRN - CNP   oxyBUTYnin (DITROPAN XL) 10 MG extended release tablet Take 1 tablet by mouth daily 3/6/25  Yes Itzel Gaitan APRN - CNP   REZDIFFRA 100 MG TABS  2/19/25  Yes Diamond Clancy MD   omega-3 acid ethyl esters (LOVAZA) 1 g capsule Take 1 capsule by mouth 2 times daily   Yes Diamond Clancy MD   lovastatin (MEVACOR) 20 MG tablet Take 1 tablet by mouth daily 3/3/25  Yes Zoila Monroy DO   levothyroxine (SYNTHROID) 175 MCG tablet Take 1 tablet by mouth Daily 1/22/25  Yes Zoila Monroy DO   valsartan-hydroCHLOROthiazide (DIOVAN-HCT) 320-25 MG per tablet Take 1 tablet by mouth daily 1/6/25  Yes Zoila Monroy DO   ibuprofen (ADVIL;MOTRIN) 800 MG tablet Take 1 tablet by mouth 3 times daily as needed for Pain 9/3/24  Yes Zoila Monroy

## 2025-07-03 RX ORDER — VALSARTAN AND HYDROCHLOROTHIAZIDE 320; 25 MG/1; MG/1
1 TABLET, FILM COATED ORAL DAILY
Qty: 90 TABLET | Refills: 1 | Status: SHIPPED | OUTPATIENT
Start: 2025-07-03

## 2025-07-03 RX ORDER — LEVOTHYROXINE SODIUM 175 UG/1
175 TABLET ORAL DAILY
Qty: 90 TABLET | Refills: 1 | Status: SHIPPED | OUTPATIENT
Start: 2025-07-03

## 2025-07-03 NOTE — TELEPHONE ENCOUNTER
Last visit:  6/24/2025  Next Visit Date:    Future Appointments   Date Time Provider Department Center   9/24/2025  1:20 PM Zoila Monroy DO [facility]   3/9/2026 10:00 AM Heron Celestin PA-C [facility]         Medication List:  Prior to Admission medications    Medication Sig Start Date End Date Taking? Authorizing Provider   pantoprazole (PROTONIX) 40 MG tablet Take 1 tablet by mouth every morning (before breakfast) 6/24/25   Zoila Monroy DO   hydrALAZINE (APRESOLINE) 50 MG tablet Take 0.5 tablets by mouth nightly - HOLD as of 6/24/25 6/24/25   Zoila Monroy DO   hydrocortisone (CORTEF) 5 MG tablet 15 mg po every morning and 5 mg po every evening 6/24/25   Zoila Monroy DO   tirzepatide-weight management (ZEPBOUND) 7.5 MG/0.5ML SOAJ subCUTAneous auto-injector pen Inject 7.5 mg into the skin once a week 6/16/25   Zoila Monroy DO   metoprolol tartrate (LOPRESSOR) 25 MG tablet Take 1 tablet by mouth daily 5/19/25   Zoila Monroy DO   potassium chloride (MICRO-K) 10 MEQ extended release capsule Take 2 capsules by mouth 2 times daily 5/19/25   Zoila Monroy DO   tamsulosin (FLOMAX) 0.4 MG capsule Take 1 capsule by mouth in the morning and at bedtime 3/6/25   Itzel Gaitan APRN - CNP   oxyBUTYnin (DITROPAN XL) 10 MG extended release tablet Take 1 tablet by mouth daily 3/6/25   Itzel Gaitan APRN - CNP   REZDIFFRA 100 MG TABS  2/19/25   Diamond Clancy MD   omega-3 acid ethyl esters (LOVAZA) 1 g capsule Take 1 capsule by mouth 2 times daily    Diamond Clancy MD   lovastatin (MEVACOR) 20 MG tablet Take 1 tablet by mouth daily 3/3/25   Zoila Monroy DO   levothyroxine (SYNTHROID) 175 MCG tablet Take 1 tablet by mouth Daily 1/22/25   Zoila Monroy DO   valsartan-hydroCHLOROthiazide (DIOVAN-HCT) 320-25 MG per tablet Take 1 tablet by mouth daily 1/6/25   Zoila Monroy DO   ibuprofen (ADVIL;MOTRIN) 800 MG tablet Take 1

## 2025-07-14 ENCOUNTER — APPOINTMENT (OUTPATIENT)
Dept: GENERAL RADIOLOGY | Age: 70
End: 2025-07-14
Payer: MEDICARE

## 2025-07-14 ENCOUNTER — HOSPITAL ENCOUNTER (EMERGENCY)
Age: 70
Discharge: ANOTHER HOSPITAL | End: 2025-07-15
Attending: FAMILY MEDICINE
Payer: MEDICARE

## 2025-07-14 ENCOUNTER — PATIENT MESSAGE (OUTPATIENT)
Dept: FAMILY MEDICINE CLINIC | Age: 70
End: 2025-07-14

## 2025-07-14 DIAGNOSIS — S72.401A CLOSED FRACTURE OF DISTAL END OF RIGHT FEMUR, UNSPECIFIED FRACTURE MORPHOLOGY, INITIAL ENCOUNTER (HCC): Primary | ICD-10-CM

## 2025-07-14 DIAGNOSIS — G47.33 SEVERE OBSTRUCTIVE SLEEP APNEA: Primary | ICD-10-CM
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
## 2025-07-14 DIAGNOSIS — Z96.651 HISTORY OF TOTAL RIGHT KNEE REPLACEMENT (TKR): ICD-10-CM

## 2025-07-14 PROCEDURE — 73551 X-RAY EXAM OF FEMUR 1: CPT

## 2025-07-14 PROCEDURE — 73562 X-RAY EXAM OF KNEE 3: CPT

## 2025-07-14 PROCEDURE — 99285 EMERGENCY DEPT VISIT HI MDM: CPT

## 2025-07-14 PROCEDURE — 29505 APPLICATION LONG LEG SPLINT: CPT

## 2025-07-14 PROCEDURE — 6370000000 HC RX 637 (ALT 250 FOR IP): Performed by: FAMILY MEDICINE

## 2025-07-14 RX ORDER — TAMSULOSIN HYDROCHLORIDE 0.4 MG/1
0.4 CAPSULE ORAL ONCE
Status: COMPLETED | OUTPATIENT
Start: 2025-07-14 | End: 2025-07-14

## 2025-07-14 RX ORDER — HYDROCORTISONE 10 MG/1
15 TABLET ORAL ONCE
Status: COMPLETED | OUTPATIENT
Start: 2025-07-14 | End: 2025-07-14

## 2025-07-14 RX ADMIN — TAMSULOSIN HYDROCHLORIDE 0.4 MG: 0.4 CAPSULE ORAL at 18:47

## 2025-07-14 RX ADMIN — HYDROCORTISONE 15 MG: 10 TABLET ORAL at 18:47

## 2025-07-14 ASSESSMENT — PAIN DESCRIPTION - LOCATION: LOCATION: KNEE

## 2025-07-14 ASSESSMENT — PAIN - FUNCTIONAL ASSESSMENT
PAIN_FUNCTIONAL_ASSESSMENT: 0-10
PAIN_FUNCTIONAL_ASSESSMENT: PREVENTS OR INTERFERES SOME ACTIVE ACTIVITIES AND ADLS

## 2025-07-14 ASSESSMENT — LIFESTYLE VARIABLES
HOW MANY STANDARD DRINKS CONTAINING ALCOHOL DO YOU HAVE ON A TYPICAL DAY: PATIENT DOES NOT DRINK
HOW OFTEN DO YOU HAVE A DRINK CONTAINING ALCOHOL: NEVER

## 2025-07-14 ASSESSMENT — PAIN DESCRIPTION - ORIENTATION: ORIENTATION: RIGHT

## 2025-07-14 ASSESSMENT — PAIN DESCRIPTION - DESCRIPTORS: DESCRIPTORS: ACHING

## 2025-07-14 ASSESSMENT — PAIN SCALES - GENERAL: PAINLEVEL_OUTOF10: 2

## 2025-07-14 ASSESSMENT — PAIN DESCRIPTION - FREQUENCY: FREQUENCY: CONTINUOUS

## 2025-07-14 ASSESSMENT — PAIN DESCRIPTION - PAIN TYPE: TYPE: ACUTE PAIN

## 2025-07-14 ASSESSMENT — PAIN DESCRIPTION - ONSET: ONSET: ON-GOING

## 2025-07-14 NOTE — ED PROVIDER NOTES
[facility]  EMERGENCY DEPARTMENT ENCOUNTER      Pt Name: Jomar Norwood  MRN: [id number]  Birthdate 1955  Date of evaluation: 7/14/2025  Provider: Rodney Méndez MD    CHIEF COMPLAINT       Chief Complaint   Patient presents with    Knee Injury     Patient fell in bathroom around 1 pm injuring right knee. HX of right TKR.          HISTORY OF PRESENT ILLNESS      Jomar Norwood is a 69 y.o. male who presents to the emergency department to the emergency room via EMS from home, patient was getting a shower when he slipped and fell, with immediate pain indicating air of his right knee.  Patient was concerned he has a right TKR with appliance, states it feels loose at this time.  Denies hitting his head denies other injury.  Pain worse with any movement.    EMS reports they did get IV access, patient was given 50 mcg of fentanyl and route, they noted his blood pressure was systolic 90 in the fluid bolus to 250 cc normal saline, pressure remained the same so gave a second bolus same, no change in pressure, systolic remaining in the 90s.    PCP: [facility]    REVIEW OF SYSTEMS       Review of Systems   All other systems reviewed and are negative.        PAST MEDICAL HISTORY     Past Medical History:   Diagnosis Date    Acromegalia (HCC)     Arthritis     H/O echocardiogram 09/13/2017    poor image quality. EF 60%.  Moderate LV hypertrophy normal LV cavity size.  Unable to assess specific wall motion abnormalities due to image quality.  No significant valvular abnormalities.  Mild diastolic dysfunction is seen.    History of stress test     25+ years ago.  per patient it was normal    History of stress test 09/14/2017    Equivocal study.  Moderate perfusion defect of moderate intensity in the inferolateral and inferior regions during stress and rest imaging.  EF  71% without regional wall motion abnormalities.  No significant electrocardiographic evidence of MI during EKG Vu Treadmill score 6,low risk for

## 2025-07-15 VITALS
DIASTOLIC BLOOD PRESSURE: 63 MMHG | SYSTOLIC BLOOD PRESSURE: 111 MMHG | HEART RATE: 81 BPM | TEMPERATURE: 98.5 F | BODY MASS INDEX: 43.67 KG/M2 | OXYGEN SATURATION: 95 % | RESPIRATION RATE: 18 BRPM | HEIGHT: 70 IN | WEIGHT: 305 LBS

## 2025-07-15 PROCEDURE — 6360000002 HC RX W HCPCS: Performed by: FAMILY MEDICINE

## 2025-07-15 PROCEDURE — 96374 THER/PROPH/DIAG INJ IV PUSH: CPT

## 2025-07-15 RX ORDER — FENTANYL CITRATE 50 UG/ML
50 INJECTION, SOLUTION INTRAMUSCULAR; INTRAVENOUS ONCE
Status: COMPLETED | OUTPATIENT
Start: 2025-07-15 | End: 2025-07-15

## 2025-07-15 RX ADMIN — FENTANYL CITRATE 50 MCG: 50 INJECTION, SOLUTION INTRAMUSCULAR; INTRAVENOUS at 02:07

## 2025-07-15 ASSESSMENT — PAIN DESCRIPTION - DESCRIPTORS
DESCRIPTORS: DISCOMFORT
DESCRIPTORS: DISCOMFORT

## 2025-07-15 ASSESSMENT — PAIN DESCRIPTION - FREQUENCY: FREQUENCY: CONTINUOUS

## 2025-07-15 ASSESSMENT — PAIN DESCRIPTION - PAIN TYPE: TYPE: ACUTE PAIN

## 2025-07-15 ASSESSMENT — PAIN DESCRIPTION - LOCATION
LOCATION: LEG
LOCATION: LEG

## 2025-07-15 ASSESSMENT — PAIN SCALES - GENERAL
PAINLEVEL_OUTOF10: 3
PAINLEVEL_OUTOF10: 0

## 2025-07-15 ASSESSMENT — PAIN DESCRIPTION - ORIENTATION
ORIENTATION: RIGHT
ORIENTATION: RIGHT

## 2025-07-15 NOTE — ED NOTES
In room to given pt update on transport status and splinting. Asked pt if he would like pain medication prior to applying cast and pt is agreeable. Dr. Méndez notified.

## 2025-07-15 NOTE — ED NOTES
Pt given update on transfer status. Pt is resting in bed, family at bedside. Warm blanket given and denies additional needs. Denies pain at this time.

## 2025-07-15 NOTE — ED NOTES
Registration called and [facility] reports they will not have a bed available and to call back in the morning.

## 2025-07-15 NOTE — ED NOTES
Patient placed on 2L NC due to oxygen decreasing below 90% on room air after medication administration.

## 2025-07-24 ENCOUNTER — TELEPHONE (OUTPATIENT)
Dept: FAMILY MEDICINE CLINIC | Age: 70
End: 2025-07-24

## 2025-08-07 ENCOUNTER — OFFICE VISIT (OUTPATIENT)
Dept: FAMILY MEDICINE CLINIC | Age: 70
End: 2025-08-07

## 2025-08-07 VITALS
DIASTOLIC BLOOD PRESSURE: 80 MMHG | OXYGEN SATURATION: 97 % | HEIGHT: 70 IN | WEIGHT: 307 LBS | HEART RATE: 78 BPM | SYSTOLIC BLOOD PRESSURE: 120 MMHG | BODY MASS INDEX: 43.95 KG/M2

## 2025-08-07 DIAGNOSIS — S72.341D CLOSED DISPLACED SPIRAL FRACTURE OF SHAFT OF RIGHT FEMUR WITH ROUTINE HEALING, SUBSEQUENT ENCOUNTER: Primary | ICD-10-CM

## 2025-08-07 DIAGNOSIS — Z09 HOSPITAL DISCHARGE FOLLOW-UP: ICD-10-CM

## 2025-08-07 DIAGNOSIS — E87.6 HYPOKALEMIA: ICD-10-CM

## 2025-08-07 DIAGNOSIS — I10 ESSENTIAL HYPERTENSION: ICD-10-CM

## 2025-08-07 DIAGNOSIS — K59.01 SLOW TRANSIT CONSTIPATION: ICD-10-CM

## 2025-08-07 RX ORDER — METHOCARBAMOL 500 MG/1
500 TABLET, FILM COATED ORAL 3 TIMES DAILY PRN
COMMUNITY
Start: 2025-08-01

## 2025-08-07 RX ORDER — ENOXAPARIN SODIUM 100 MG/ML
40 INJECTION SUBCUTANEOUS EVERY 12 HOURS
COMMUNITY
Start: 2025-08-01 | End: 2025-08-13

## 2025-08-07 RX ORDER — OMEGA-3 FATTY ACIDS/FISH OIL 300-1000MG
CAPSULE ORAL
Qty: 30 CAPSULE | Refills: 0
Start: 2025-08-07

## 2025-08-07 RX ORDER — SENNOSIDES 8.6 MG/1
8.6 TABLET ORAL DAILY
COMMUNITY
Start: 2025-08-01

## 2025-08-11 ENCOUNTER — TELEPHONE (OUTPATIENT)
Dept: UROLOGY | Age: 70
End: 2025-08-11

## 2025-08-11 DIAGNOSIS — N39.41 URGE INCONTINENCE: Primary | ICD-10-CM

## 2025-09-04 ENCOUNTER — OFFICE VISIT (OUTPATIENT)
Dept: FAMILY MEDICINE CLINIC | Age: 70
End: 2025-09-04
Payer: MEDICARE

## 2025-09-04 ENCOUNTER — PATIENT MESSAGE (OUTPATIENT)
Dept: FAMILY MEDICINE CLINIC | Age: 70
End: 2025-09-04

## 2025-09-04 VITALS
HEART RATE: 73 BPM | WEIGHT: 303 LBS | BODY MASS INDEX: 43.38 KG/M2 | SYSTOLIC BLOOD PRESSURE: 142 MMHG | HEIGHT: 70 IN | DIASTOLIC BLOOD PRESSURE: 90 MMHG | OXYGEN SATURATION: 97 %

## 2025-09-04 DIAGNOSIS — K57.92 DIVERTICULITIS: Primary | ICD-10-CM

## 2025-09-04 PROCEDURE — G8427 DOCREV CUR MEDS BY ELIG CLIN: HCPCS | Performed by: NURSE PRACTITIONER

## 2025-09-04 PROCEDURE — 3077F SYST BP >= 140 MM HG: CPT | Performed by: NURSE PRACTITIONER

## 2025-09-04 PROCEDURE — 1159F MED LIST DOCD IN RCRD: CPT | Performed by: NURSE PRACTITIONER

## 2025-09-04 PROCEDURE — 1123F ACP DISCUSS/DSCN MKR DOCD: CPT | Performed by: NURSE PRACTITIONER

## 2025-09-04 PROCEDURE — G8417 CALC BMI ABV UP PARAM F/U: HCPCS | Performed by: NURSE PRACTITIONER

## 2025-09-04 PROCEDURE — 99213 OFFICE O/P EST LOW 20 MIN: CPT | Performed by: NURSE PRACTITIONER

## 2025-09-04 PROCEDURE — 3080F DIAST BP >= 90 MM HG: CPT | Performed by: NURSE PRACTITIONER

## 2025-09-04 PROCEDURE — 1036F TOBACCO NON-USER: CPT | Performed by: NURSE PRACTITIONER

## 2025-09-04 PROCEDURE — 3017F COLORECTAL CA SCREEN DOC REV: CPT | Performed by: NURSE PRACTITIONER

## 2025-09-04 RX ORDER — METRONIDAZOLE 500 MG/1
500 TABLET ORAL 3 TIMES DAILY
Qty: 21 TABLET | Refills: 0 | Status: SHIPPED | OUTPATIENT
Start: 2025-09-04 | End: 2025-09-11

## 2025-09-04 RX ORDER — CIPROFLOXACIN 500 MG/1
500 TABLET, FILM COATED ORAL 2 TIMES DAILY
Qty: 14 TABLET | Refills: 0 | Status: SHIPPED | OUTPATIENT
Start: 2025-09-04 | End: 2025-09-11

## 2025-09-04 ASSESSMENT — PATIENT HEALTH QUESTIONNAIRE - PHQ9
SUM OF ALL RESPONSES TO PHQ QUESTIONS 1-9: 0
SUM OF ALL RESPONSES TO PHQ QUESTIONS 1-9: 0
2. FEELING DOWN, DEPRESSED OR HOPELESS: NOT AT ALL
SUM OF ALL RESPONSES TO PHQ QUESTIONS 1-9: 0
SUM OF ALL RESPONSES TO PHQ QUESTIONS 1-9: 0
1. LITTLE INTEREST OR PLEASURE IN DOING THINGS: NOT AT ALL

## 2025-09-04 ASSESSMENT — ENCOUNTER SYMPTOMS
COUGH: 0
ABDOMINAL PAIN: 1
NAUSEA: 0
VOMITING: 0
DIARRHEA: 0
SHORTNESS OF BREATH: 0

## (undated) DEVICE — NEEDLE HYPO 30GA L0.5IN BGE POLYPR HUB S STL REG BVL STR

## (undated) DEVICE — 1 EACH 40411 STERILE DISPOSABLE SUPER VIEW® LENS SET & 1 EACH 40100 STERILE MICROSCOPE DRAPE: Brand: SUPER VIEW® PACK

## (undated) DEVICE — AIRLIFE™ NASAL OXYGEN CANNULA CURVED, FLARED TIP, WITH 7 FEET (2.1 M) CRUSH RESISTANT TUBING, OVER-THE-EAR STYLE: Brand: AIRLIFE™

## (undated) DEVICE — Device: Brand: ALLEGRO 1X SILICONE I/A HANDPIECE (6)

## (undated) DEVICE — SUTURE VCRL SZ 7-0 L12IN ABSRB VLT L6.5MM TG140-8 3/8 CIR J566G

## (undated) DEVICE — SOLUTION IV IRRIG POUR BRL 0.9% SODIUM CHL 2F7124

## (undated) DEVICE — BETADINE 5% EYE SOL

## (undated) DEVICE — SOLUTION PREP POVIDONE IOD FOR SKIN MUCOUS MEM PRIOR TO

## (undated) DEVICE — SURGICAL PROCEDURE PACK 25 GA VITRECTOMY

## (undated) DEVICE — SYRINGE MED 1ML POLYCARB LUERLOCK HUB

## (undated) DEVICE — SOFT SHIELD® COLLAGEN SHIELD, 12 HOURS (CE): Brand: SOFT SHIELD® COLLAGEN SHIELDS

## (undated) DEVICE — SOLUTION IV IRRIG WATER 1000ML POUR BRL 2F7114

## (undated) DEVICE — ISPAN SF6 SULPHUR HEXAFLUORIDEISPAN* SF6 IS A FLUORINATED GREENHOUSE GAS COVERED BY THE KYOTO PROTOCOL AND HAS A GLOBAL WARMING POTENTIAL (GWP) OF 22,200. RESIDUAL ISPAN* SF6 GAS SHOULD BE RECOVERED BY APPROPRIATELY QUALIFIED PERSONNEL AND RECYCLED, RECLAIMED OR DESTROYED IN ACCORDANCE WITH LOCAL ORDINANCES.: Brand: ISPAN

## (undated) DEVICE — CAUTERY BPLR 25GA INTOCU W/ HNDPC CRD DISP FOR CX9404

## (undated) DEVICE — PAD,EYE,1-5/8X2 5/8,STERILE,LF,1/PK: Brand: MEDLINE

## (undated) DEVICE — DRESSING TRNSPAR FLM 2.4X2.8IN SURESITE 123

## (undated) DEVICE — 25 GA FLEXIBLE TIP LASER PROBE: Brand: ALCON, ENGAUGE

## (undated) DEVICE — GOWN,AURORA,NON-REINFORCED,2XL: Brand: MEDLINE

## (undated) DEVICE — SYRINGE MED 50ML LUERLOCK TIP

## (undated) DEVICE — SOLUTION IRRIG 1000ML PENTALYTE PLAS POUR BTL TIS U SOL

## (undated) DEVICE — RETINA PK

## (undated) DEVICE — OCCLUDER EYE POLYETH HYPOALRG ADH TAPE W/O PINHOLE

## (undated) DEVICE — AGENT VISCOELASTIC 1ML 2% HYDROXYPROPYL METHCELL PRELD GLS

## (undated) DEVICE — 25GA EZPASS SOFT TIP CANNULA BOX OF 5: Brand: VORTEX SURGICAL INC

## (undated) DEVICE — SUTURE PLN GUT SZ 6-0 L18IN ABSRB YELLOWISH TAN L6.5MM 1735G

## (undated) DEVICE — KNIFE OPHTH DIA22MM 45DEG SLT W HNDL SHRP ANG PNT DEL DBL

## (undated) DEVICE — Device

## (undated) DEVICE — DISCONTINUED USE 435154 INVENTORY EXISTS CSFILTER SYRINGE BL ST SLGS033SS (50/BX)